# Patient Record
Sex: MALE | Race: BLACK OR AFRICAN AMERICAN | Employment: UNEMPLOYED | URBAN - METROPOLITAN AREA
[De-identification: names, ages, dates, MRNs, and addresses within clinical notes are randomized per-mention and may not be internally consistent; named-entity substitution may affect disease eponyms.]

---

## 2017-02-10 ENCOUNTER — OFFICE VISIT (OUTPATIENT)
Dept: INTERNAL MEDICINE CLINIC | Age: 52
End: 2017-02-10

## 2017-02-10 VITALS
BODY MASS INDEX: 32.32 KG/M2 | SYSTOLIC BLOOD PRESSURE: 119 MMHG | WEIGHT: 194 LBS | HEIGHT: 65 IN | DIASTOLIC BLOOD PRESSURE: 80 MMHG | HEART RATE: 67 BPM | RESPIRATION RATE: 14 BRPM

## 2017-02-10 DIAGNOSIS — E78.00 HYPERCHOLESTEREMIA: Primary | ICD-10-CM

## 2017-02-10 DIAGNOSIS — M62.838 TRAPEZIUS MUSCLE SPASM: ICD-10-CM

## 2017-02-10 RX ORDER — TADALAFIL 20 MG/1
20 TABLET ORAL AS NEEDED
Qty: 6 TAB | Refills: 0 | Status: SHIPPED | OUTPATIENT
Start: 2017-02-10 | End: 2017-07-13 | Stop reason: ALTCHOICE

## 2017-02-10 RX ORDER — ARIPIPRAZOLE 2 MG/1
5 TABLET ORAL 2 TIMES DAILY
COMMUNITY
End: 2017-10-12

## 2017-02-10 RX ORDER — LOVASTATIN 40 MG/1
40 TABLET ORAL
Qty: 30 TAB | Refills: 6 | Status: SHIPPED | OUTPATIENT
Start: 2017-02-10 | End: 2017-10-12

## 2017-02-10 NOTE — PROGRESS NOTES
Chief Complaint   Patient presents with    Cholesterol Problem     f/u     he is a 46y.o. year old male who presents for follow-up of   hyperlipidemia. Cardiovascular risk analysis - LDL goal is under 160  hypertension  hyperlipidemia. Compliance with treatment thus far has been good. New concerns: He states that he has tried to call to get a prescription for the new dose of cholesterol but hasn't been able to get through. He is still on 20 mg. Social History, Diet and Lifestyle: generally follows a low fat low cholesterol diet      Lab Results  Component Value Date/Time   Cholesterol, total 213 11/11/2016 12:06 PM   HDL Cholesterol 53 11/11/2016 12:06 PM   LDL, calculated 141 11/11/2016 12:06 PM   Triglyceride 97 11/11/2016 12:06 PM          ROS: taking medications as instructed, no medication side effects noted, no TIA's, no chest pain on exertion, no dyspnea on exertion, no swelling of ankles. Reviewed and agree with Nurse Note and duplicated in this note. Reviewed PmHx, RxHx, FmHx, SocHx, AllgHx and updated and dated in the chart.     Family History   Problem Relation Age of Onset    Heart Disease Mother     Stroke Mother        Past Medical History   Diagnosis Date    Anxiety disorder     Depression     Diarrhea     Falls     Headache     Joint pain     Joint pain     Memory disorder     Memory loss     Muscle pain     Muscle pain     Muscle weakness     Muscle weakness     Torn rotator cuff      right side    Vertigo     Visual disturbance     Visual disturbance       Social History     Social History    Marital status:      Spouse name: N/A    Number of children: N/A    Years of education: N/A     Social History Main Topics    Smoking status: Never Smoker    Smokeless tobacco: Never Used    Alcohol use No    Drug use: Not on file    Sexual activity: Not on file     Other Topics Concern    Not on file     Social History Narrative      Patient Active Problem List    Diagnosis Date Noted    Anxiety 12/13/2016    Intractable chronic post-traumatic headache 12/13/2016    ADD (attention deficit disorder) 12/13/2016    Hypovitaminosis D 03/07/2016    Hypercholesteremia 03/07/2016     Current Outpatient Prescriptions   Medication Sig Dispense Refill    ARIPiprazole (ABILIFY) 2 mg tablet Take 2 mg by mouth daily.  tadalafil (CIALIS) 20 mg tablet Take 1 Tab by mouth as needed. 6 Tab 0    lovastatin (MEVACOR) 40 mg tablet Take 1 Tab by mouth nightly. 30 Tab 6    atorvastatin (LIPITOR) 20 mg tablet Take  by mouth daily.  gabapentin (NEURONTIN) 300 mg capsule Take 2 Caps by mouth three (3) times daily. 180 Cap 3    prazosin (MINIPRESS) 2 mg capsule Take 5 mg by mouth nightly.  sertraline (ZOLOFT) 100 mg tablet Take 200 mg by mouth daily.  dextromethorphan-quiNIDine (NUEDEXTA) 20-10 mg per capsule Take 1 Cap by mouth two (2) times a day.  butalbital-acetaminophen-caffeine (FIORICET) -40 mg per tablet Take 1 Tab by mouth. No Known Allergies  Past Medical History   Diagnosis Date    Anxiety disorder     Depression     Diarrhea     Falls     Headache     Joint pain     Joint pain     Memory disorder     Memory loss     Muscle pain     Muscle pain     Muscle weakness     Muscle weakness     Torn rotator cuff      right side    Vertigo     Visual disturbance     Visual disturbance      No past surgical history on file.   Family History   Problem Relation Age of Onset    Heart Disease Mother     Stroke Mother            Objective:     Vitals:    02/10/17 0936   BP: 119/80   Pulse: 67   Resp: 14   Weight: 194 lb (88 kg)   Height: 5' 5\" (1.651 m)       Physical Examination: General appearance - alert, well appearing, and in no distress  Eyes - pupils equal and reactive, extraocular eye movements intact  Ears - bilateral TM's and external ear canals normal  Nose - normal and patent, no erythema, discharge or polyps  Mouth - mucous membranes moist, pharynx normal without lesions  Neck - supple, no significant adenopathy  Chest - clear to auscultation, no wheezes, rales or rhonchi, symmetric air entry  Heart - normal rate, regular rhythm, normal S1, S2, no murmurs, rubs, clicks or gallops  Abdomen - soft, nontender, nondistended, no masses or organomegaly  Musculoskeletal - The right shoulder is  normal to inspection. The patient has full range of motion  . The shoulderis tender to palpation upper trap. Bicep tendon: non-tender  The NEER test is negative. The Snyder test:is negative   The Cross over test:is  negative. The Empty Can test:is  negative. Stressed ext rotation is:  negative. Stressed int rotation: negative. The Apprehension Sign is negative. The Lift off test is:  negative   Examination reveals the Painful Arch:  negative. The Labral Test is:  negative. The Sulcus Sign is:  negative. Extremities - peripheral pulses normal, no pedal edema, no clubbing or cyanosis  Skin - normal coloration and turgor, no rashes, no suspicious skin lesions noted    Assessment/ Plan:   Lauren was seen today for cholesterol problem. Diagnoses and all orders for this visit:    Hypercholesteremia    Trapezius muscle spasm  -     REFERRAL TO PHYSICAL THERAPY    Other orders  -     tadalafil (CIALIS) 20 mg tablet; Take 1 Tab by mouth as needed. -     lovastatin (MEVACOR) 40 mg tablet; Take 1 Tab by mouth nightly. Follow-up Disposition:  Return in about 6 months (around 8/10/2017), or if symptoms worsen or fail to improve. I have discussed the diagnosis with the patient and the intended plan as seen in the above orders. The patient has received an after-visit summary and questions were answered concerning future plans.      Medication Side Effects and Warnings were discussed with patient: yes  Patient Labs were reviewed and or requested: yes  Patient Past Records were reviewed and or requested  yes  I have discussed the diagnosis with the patient and the intended plan as seen in the above orders. The patient has received an after-visit summary and questions were answered concerning future plans. Pt agrees to call or return to clinic and/or go to closest ER with any worsening of symptoms. This may include, but not limited to increased fever (>100.4) with NSAIDS or Tylenol, increased edema, confusion, rash, worsening of presenting symptoms. 1) Remember to stay active and/or exercise regularly (I suggest 30-45 minutes daily)   2) For reliable dietary information, go to www. EATRIGHT.org. You may wish to consider seeing the nutritionist at [facility] at #[phone] or [phone], also consider the [address]   3) I routinely suggest a complete physical exam once each year (your birth month)

## 2017-02-10 NOTE — MR AVS SNAPSHOT
Visit Information Date & Time Provider Department Dept. Phone Encounter #  
 2/10/2017  8:45 AM [address] [place] [address] MD [facility] [phone] [id number] Follow-up Instructions Return in about 6 months (around 8/10/2017), or if symptoms worsen or fail to improve. Follow-up and Disposition History Your Appointments 3/22/2017  9:40 AM  
Follow Up with MD [facility] at [address] [place] [address]) Appt Note: 3 month f/u headaches KU 12/13 [address] [place] [address] Drive [address] [facility] [address]  
[phone]  
  
   
 [address] [place] [id number]  
  
    
 8/4/2017  8:45 AM  
Any with [address] [place] [address] MD Siobhan  
[address] [place] [address] and Primary Care [address] [place] [address]) [address] [facility]  
  
   
 [address] [id number] Upcoming Health Maintenance Date Due FOBT Q 1 YEAR AGE 50-75 3/3/2017 DTaP/Tdap/Td series (2 - Td) 3/3/2026 Allergies as of 2/10/2017  Review Complete On: 2/10/2017 By: [address] [place] [address] MD Siobhan  
 No Known Allergies Current Immunizations  Reviewed on 3/3/2016 Name Date Tdap 3/3/2016 Not reviewed this visit You Were Diagnosed With   
  
 Codes Comments Hypercholesteremia    -  Primary ICD-10-CM: E78.00 ICD-9-CM: 272.0 Trapezius muscle spasm     ICD-10-CM: B22.575 ICD-9-CM: 728.85 Vitals BP Pulse Resp Height(growth percentile) Weight(growth percentile) BMI  
 119/80 67 14 5' 5\" (1.651 m) 194 lb (88 kg) 32.28 kg/m2 Smoking Status Never Smoker BMI and BSA Data Body Mass Index Body Surface Area  
 32.28 kg/m 2 2.01 m 2 Preferred Pharmacy Pharmacy Name Phone [id number] [place], [address] [address] [phone] Your Updated Medication List  
  
   
 This list is accurate as of: 2/10/17  9:58 AM.  Always use your most recent med list.  
  
  
  
  
 ABILIFY 2 mg tablet Generic drug:  ARIPiprazole Take 2 mg by mouth daily. atorvastatin 20 mg tablet Commonly known as:  LIPITOR Take  by mouth daily. FIORICET -40 mg per tablet Generic drug:  butalbital-acetaminophen-caffeine Take 1 Tab by mouth.  
  
 gabapentin 300 mg capsule Commonly known as:  NEURONTIN Take 2 Caps by mouth three (3) times daily. lovastatin 40 mg tablet Commonly known as:  MEVACOR Take 1 Tab by mouth nightly. NUEDEXTA 20-10 mg per capsule Generic drug:  dextromethorphan-quiNIDine Take 1 Cap by mouth two (2) times a day. prazosin 2 mg capsule Commonly known as:  MINIPRESS Take 5 mg by mouth nightly. tadalafil 20 mg tablet Commonly known as:  CIALIS Take 1 Tab by mouth as needed. ZOLOFT 100 mg tablet Generic drug:  sertraline Take 200 mg by mouth daily. Prescriptions Sent to Pharmacy Refills  
 tadalafil (CIALIS) 20 mg tablet 0 Sig: Take 1 Tab by mouth as needed. Class: Normal  
 Pharmacy: [address] [place] [address] Ph #: [phone] Route: Oral  
 lovastatin (MEVACOR) 40 mg tablet 6 Sig: Take 1 Tab by mouth nightly. Class: Normal  
 Pharmacy: [address] [place] [address] Ph #: [phone] Route: Oral  
  
We Performed the Following LIPID PANEL [14373 CPT(R)] METABOLIC PANEL, COMPREHENSIVE [62495 CPT(R)] REFERRAL TO PHYSICAL THERAPY [AGF95 Custom] Follow-up Instructions Return in about 6 months (around 8/10/2017), or if symptoms worsen or fail to improve. Referral Information Referral ID Referred By Referred To  
  
 [id number] Sharona Shay [facility] [place] MICHELLE PT PHILLIP   
   [address]   
   [place], [address] Phone: [phone] Fax: [phone] Visits Status Start Date End Date  
 21 New Request 2/10/17 2/10/18 If your referral has a status of pending review or denied, additional information will be sent to support the outcome of this decision. Introducing [facility] & HEALTH SERVICES! Dear Chandrika Perdue: 
Thank you for requesting a Salmon Social account. Our records indicate that you already have an active Salmon Social account. You can access your account anytime at [email or web address] Did you know that you can access your hospital and ER discharge instructions at any time in Salmon Social? You can also review all of your test results from your hospital stay or ER visit. Additional Information If you have questions, please visit the Frequently Asked Questions section of the Salmon Social website at [email or web address] Remember, Salmon Social is NOT to be used for urgent needs. For medical emergencies, dial 911. Now available from your iPhone and Android! Please provide this summary of care documentation to your next provider. Your primary care clinician is listed as [address] [place] [address] . If you have any questions after today's visit, please call [phone].

## 2017-02-27 ENCOUNTER — APPOINTMENT (OUTPATIENT)
Dept: PHYSICAL THERAPY | Age: 52
End: 2017-02-27

## 2017-03-22 ENCOUNTER — OFFICE VISIT (OUTPATIENT)
Dept: NEUROLOGY | Age: 52
End: 2017-03-22

## 2017-03-22 VITALS
DIASTOLIC BLOOD PRESSURE: 90 MMHG | OXYGEN SATURATION: 98 % | SYSTOLIC BLOOD PRESSURE: 130 MMHG | BODY MASS INDEX: 32.45 KG/M2 | WEIGHT: 195 LBS | RESPIRATION RATE: 18 BRPM | HEART RATE: 75 BPM

## 2017-03-22 DIAGNOSIS — G44.321 INTRACTABLE CHRONIC POST-TRAUMATIC HEADACHE: Primary | ICD-10-CM

## 2017-03-22 RX ORDER — GABAPENTIN 300 MG/1
600 CAPSULE ORAL 3 TIMES DAILY
Qty: 180 CAP | Refills: 3 | Status: SHIPPED | COMMUNITY
Start: 2017-03-22 | End: 2017-07-13 | Stop reason: DRUGHIGH

## 2017-03-22 RX ORDER — CYCLOBENZAPRINE HCL 10 MG
TABLET ORAL
COMMUNITY
End: 2017-10-12

## 2017-03-22 NOTE — MR AVS SNAPSHOT
Visit Information Date & Time Provider Department Dept. Phone Encounter #  
 3/22/2017  9:40 AM Lorin Castellanos MD [facility] at [address] [id number] Follow-up Instructions Return in about 3 months (around 6/22/2017). Your Appointments 8/4/2017  8:45 AM  
Any with Edwardo Huitron MD  
[address] [place] [address] and Primary Care [facility]) [address] [facility]  
  
   
 [address] [id number] Upcoming Health Maintenance Date Due FOBT Q 1 YEAR AGE 50-75 3/3/2017 DTaP/Tdap/Td series (2 - Td) 3/3/2026 Allergies as of 3/22/2017  Review Complete On: 3/22/2017 By: Lorin Castellanos MD  
 No Known Allergies Current Immunizations  Reviewed on 3/3/2016 Name Date Tdap 3/3/2016 Not reviewed this visit You Were Diagnosed With   
  
 Codes Comments Intractable chronic post-traumatic headache    -  Primary ICD-10-CM: U38.463 ICD-9-CM: 339.22 Vitals BP Pulse Resp Weight(growth percentile) SpO2 BMI  
 130/90 75 18 195 lb (88.5 kg) 98% 32.45 kg/m2 Smoking Status Never Smoker BMI and BSA Data Body Mass Index Body Surface Area  
 32.45 kg/m 2 2.01 m 2 Preferred Pharmacy Pharmacy Name Phone [facility], [address] RUDD Your Updated Medication List  
  
   
This list is accurate as of: 3/22/17 10:02 AM.  Always use your most recent med list.  
  
  
  
  
 ABILIFY 2 mg tablet Generic drug:  ARIPiprazole Take 2 mg by mouth daily. atorvastatin 20 mg tablet Commonly known as:  LIPITOR Take  by mouth daily. cyclobenzaprine 10 mg tablet Commonly known as:  FLEXERIL Take  by mouth three (3) times daily as needed for Muscle Spasm(s). FIORICET -40 mg per tablet Generic drug:  butalbital-acetaminophen-caffeine Take 1 Tab by mouth.  
  
 gabapentin 300 mg capsule Commonly known as:  NEURONTIN Take 2 Caps by mouth three (3) times daily. lovastatin 40 mg tablet Commonly known as:  MEVACOR Take 1 Tab by mouth nightly. NUEDEXTA 20-10 mg per capsule Generic drug:  dextromethorphan-quiNIDine Take 1 Cap by mouth two (2) times a day. prazosin 2 mg capsule Commonly known as:  MINIPRESS Take 5 mg by mouth nightly. tadalafil 20 mg tablet Commonly known as:  CIALIS Take 1 Tab by mouth as needed. ZOLOFT 100 mg tablet Generic drug:  sertraline Take 200 mg by mouth daily. Prescriptions Sent to Mail Order Refills  
 gabapentin (NEURONTIN) 300 mg capsule 3 Sig: Take 2 Caps by mouth three (3) times daily. Class: Mail Order Pharmacy: [facility], [address] [address]  #: [phone] Route: Oral  
  
Follow-up Instructions Return in about 3 months (around 6/22/2017). Patient Instructions A Healthy Lifestyle: Care Instructions Your Care Instructions A healthy lifestyle can help you feel good, stay at a healthy weight, and have plenty of energy for both work and play. A healthy lifestyle is something you can share with your whole family. A healthy lifestyle also can lower your risk for serious health problems, such as high blood pressure, heart disease, and diabetes. You can follow a few steps listed below to improve your health and the health of your family. Follow-up care is a key part of your treatment and safety. Be sure to make and go to all appointments, and call your doctor if you are having problems. Its also a good idea to know your test results and keep a list of the medicines you take. How can you care for yourself at home? · Do not eat too much sugar, fat, or fast foods. You can still have dessert and treats now and then. The goal is moderation. · Start small to improve your eating habits. Pay attention to portion sizes, drink less juice and soda pop, and eat more fruits and vegetables. ¨ Eat a healthy amount of food. A 3-ounce serving of meat, for example, is about the size of a deck of cards. Fill the rest of your plate with vegetables and whole grains. ¨ Limit the amount of soda and sports drinks you have every day. Drink more water when you are thirsty. ¨ Eat at least 5 servings of fruits and vegetables every day. It may seem like a lot, but it is not hard to reach this goal. A serving or helping is 1 piece of fruit, 1 cup of vegetables, or 2 cups of leafy, raw vegetables. Have an apple or some carrot sticks as an afternoon snack instead of a candy bar. Try to have fruits and/or vegetables at every meal. 
· Make exercise part of your daily routine. You may want to start with simple activities, such as walking, bicycling, or slow swimming. Try to be active 30 to 60 minutes every day. You do not need to do all 30 to 60 minutes all at once. For example, you can exercise 3 times a day for 10 or 20 minutes. Moderate exercise is safe for most people, but it is always a good idea to talk to your doctor before starting an exercise program. 
· Keep moving. Michelle Hesteris the lawn, work in the garden, or [place]. Take the stairs instead of the elevator at work. · If you smoke, quit. People who smoke have an increased risk for heart attack, stroke, cancer, and other lung illnesses. Quitting is hard, but there are ways to boost your chance of quitting tobacco for good. ¨ Use nicotine gum, patches, or lozenges. ¨ Ask your doctor about stop-smoking programs and medicines. ¨ Keep trying. In addition to reducing your risk of diseases in the future, you will notice some benefits soon after you stop using tobacco. If you have shortness of breath or asthma symptoms, they will likely get better within a few weeks after you quit. · Limit how much alcohol you drink. Moderate amounts of alcohol (up to 2 drinks a day for men, 1 drink a day for women) are okay. But drinking too much can lead to liver problems, high blood pressure, and other health problems. Family health If you have a family, there are many things you can do together to improve your health. · Eat meals together as a family as often as possible. · Eat healthy foods. This includes fruits, vegetables, lean meats and dairy, and whole grains. · Include your family in your fitness plan. Most people think of activities such as jogging or tennis as the way to fitness, but there are many ways you and your family can be more active. Anything that makes you breathe hard and gets your heart pumping is exercise. Here are some tips: 
¨ Walk to do errands or to take your child to school or the bus. ¨ Go for a family bike ride after dinner instead of watching TV. Where can you learn more? Go to http://paulie-nestor.info/. Enter P902 in the search box to learn more about \"A Healthy Lifestyle: Care Instructions. \" Current as of: July 26, 2016 Content Version: 11.1 © 4604-9958 [employer], [employer]. Care instructions adapted under license by Open Me (which disclaims liability or warranty for this information). If you have questions about a medical condition or this instruction, always ask your healthcare professional. [address] any warranty or liability for your use of this information. Introducing [facility] & HEALTH SERVICES! Dear [facility]: 
Thank you for requesting a Restalo account. Our records indicate that you already have an active Restalo account. You can access your account anytime at [email or web address] Did you know that you can access your hospital and ER discharge instructions at any time in Restalo? You can also review all of your test results from your hospital stay or ER visit. Additional Information If you have questions, please visit the Frequently Asked Questions section of the Linkagoalt website at [email or web address] Remember, [employer] is NOT to be used for urgent needs. For medical emergencies, dial 911. Now available from your iPhone and Android! Please provide this summary of care documentation to your next provider. Your primary care clinician is listed as Alyson Edwards. If you have any questions after today's visit, please call [phone].
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
## 2017-03-22 NOTE — PATIENT INSTRUCTIONS

## 2017-07-13 ENCOUNTER — OFFICE VISIT (OUTPATIENT)
Dept: NEUROLOGY | Age: 52
End: 2017-07-13

## 2017-07-13 VITALS
RESPIRATION RATE: 14 BRPM | BODY MASS INDEX: 30.99 KG/M2 | HEART RATE: 74 BPM | WEIGHT: 186 LBS | DIASTOLIC BLOOD PRESSURE: 96 MMHG | SYSTOLIC BLOOD PRESSURE: 148 MMHG | OXYGEN SATURATION: 96 % | HEIGHT: 65 IN

## 2017-07-13 DIAGNOSIS — G43.709 CHRONIC MIGRAINE WITHOUT AURA WITHOUT STATUS MIGRAINOSUS, NOT INTRACTABLE: ICD-10-CM

## 2017-07-13 DIAGNOSIS — R90.89 ABNORMAL FINDING ON MRI OF BRAIN: ICD-10-CM

## 2017-07-13 DIAGNOSIS — G44.329 CHRONIC POST-TRAUMATIC HEADACHE, NOT INTRACTABLE: Primary | ICD-10-CM

## 2017-07-13 RX ORDER — GABAPENTIN 600 MG/1
600 TABLET ORAL 3 TIMES DAILY
Qty: 90 TAB | Refills: 5 | Status: SHIPPED | OUTPATIENT
Start: 2017-07-13 | End: 2017-10-12

## 2017-07-13 RX ORDER — TRAZODONE HYDROCHLORIDE 50 MG/1
50 TABLET ORAL
COMMUNITY

## 2017-07-13 NOTE — MR AVS SNAPSHOT
Visit Information Date & Time Provider Department Dept. Phone Encounter #  
 7/13/2017  8:40 AM MD [facility] at [address] [id number] Follow-up Instructions Return in about 4 months (around 11/13/2017). Your Appointments 8/4/2017  8:45 AM  
Any with Rose Montenegro MD  
[address] [place] [address] and Primary Care [facility] UlDavid Pose[address] [facility]  
  
   
 Kathy [address]ona [phone] Upcoming Health Maintenance Date Due FOBT Q 1 YEAR AGE 50-75 3/3/2017 INFLUENZA AGE 9 TO ADULT 8/1/2017 DTaP/Tdap/Td series (2 - Td) 3/3/2026 Allergies as of 7/13/2017  Review Complete On: 7/13/2017 By: Christiana Pineda MD  
 No Known Allergies Current Immunizations  Reviewed on 3/3/2016 Name Date Tdap 3/3/2016 Not reviewed this visit You Were Diagnosed With   
  
 Codes Comments Chronic post-traumatic headache, not intractable    -  Primary ICD-10-CM: S88.368 ICD-9-CM: 339.22 Chronic migraine without aura without status migrainosus, not intractable     ICD-10-CM: U21.292 ICD-9-CM: 346.70 Abnormal finding on MRI of brain     ICD-10-CM: R90.89 ICD-9-CM: 793.0 Vitals BP Pulse Resp Height(growth percentile) Weight(growth percentile) SpO2  
 (!) 148/96 74 14 5' 5\" (1.651 m) 186 lb (84.4 kg) 96% BMI Smoking Status 30.95 kg/m2 Never Smoker Vitals History BMI and BSA Data Body Mass Index Body Surface Area 30.95 kg/m 2 1.97 m 2 Preferred Pharmacy Pharmacy Name Phone [facility] [place], [address] RUDD Your Updated Medication List  
  
   
This list is accurate as of: 7/13/17  9:12 AM.  Always use your most recent med list.  
  
  
  
  
 ABILIFY 2 mg tablet Generic drug:  ARIPiprazole Take 5 mg by mouth two (2) times a day. cyclobenzaprine 10 mg tablet Commonly known as:  FLEXERIL Take  by mouth three (3) times daily as needed for Muscle Spasm(s). FIORICET -40 mg per tablet Generic drug:  butalbital-acetaminophen-caffeine Take 1 Tab by mouth.  
  
 gabapentin 600 mg tablet Commonly known as:  NEURONTIN Take 1 Tab by mouth three (3) times daily. lovastatin 40 mg tablet Commonly known as:  MEVACOR Take 1 Tab by mouth nightly. NUEDEXTA 20-10 mg per capsule Generic drug:  dextromethorphan-quiNIDine Take 1 Cap by mouth two (2) times a day. prazosin 2 mg capsule Commonly known as:  MINIPRESS Take 5 mg by mouth nightly. traZODone 50 mg tablet Commonly known as:  Navid Barge Take 25 mg by mouth nightly. ZOLOFT 100 mg tablet Generic drug:  sertraline Take 200 mg by mouth daily. Prescriptions Sent to Pharmacy Refills  
 gabapentin (NEURONTIN) 600 mg tablet 5 Sig: Take 1 Tab by mouth three (3) times daily. Class: Normal  
 Pharmacy: [address] [place] [address], [address] [place] [address] #: [phone] Route: Oral  
  
Follow-up Instructions Return in about 4 months (around 11/13/2017). To-Do List   
 07/17/2017 Imaging:  MRI BRAIN W WO CONT Patient Instructions A Healthy Lifestyle: Care Instructions Your Care Instructions A healthy lifestyle can help you feel good, stay at a healthy weight, and have plenty of energy for both work and play. A healthy lifestyle is something you can share with your whole family. A healthy lifestyle also can lower your risk for serious health problems, such as high blood pressure, heart disease, and diabetes. You can follow a few steps listed below to improve your health and the health of your family. Follow-up care is a key part of your treatment and safety.  Be sure to make and go to all appointments, and call your doctor if you are having problems. Its also a good idea to know your test results and keep a list of the medicines you take. How can you care for yourself at home? · Do not eat too much sugar, fat, or fast foods. You can still have dessert and treats now and then. The goal is moderation. · Start small to improve your eating habits. Pay attention to portion sizes, drink less juice and soda pop, and eat more fruits and vegetables. ¨ Eat a healthy amount of food. A 3-ounce serving of meat, for example, is about the size of a deck of cards. Fill the rest of your plate with vegetables and whole grains. ¨ Limit the amount of soda and sports drinks you have every day. Drink more water when you are thirsty. ¨ Eat at least 5 servings of fruits and vegetables every day. It may seem like a lot, but it is not hard to reach this goal. A serving or helping is 1 piece of fruit, 1 cup of vegetables, or 2 cups of leafy, raw vegetables. Have an apple or some carrot sticks as an afternoon snack instead of a candy bar. Try to have fruits and/or vegetables at every meal. 
· Make exercise part of your daily routine. You may want to start with simple activities, such as walking, bicycling, or slow swimming. Try to be active 30 to 60 minutes every day. You do not need to do all 30 to 60 minutes all at once. For example, you can exercise 3 times a day for 10 or 20 minutes. Moderate exercise is safe for most people, but it is always a good idea to talk to your doctor before starting an exercise program. 
· Keep moving. Dustin Em the lawn, work in the garden, or [employer]. Take the stairs instead of the elevator at work. · If you smoke, quit. People who smoke have an increased risk for heart attack, stroke, cancer, and other lung illnesses. Quitting is hard, but there are ways to boost your chance of quitting tobacco for good. ¨ Use nicotine gum, patches, or lozenges. ¨ Ask your doctor about stop-smoking programs and medicines. ¨ Keep trying. In addition to reducing your risk of diseases in the future, you will notice some benefits soon after you stop using tobacco. If you have shortness of breath or asthma symptoms, they will likely get better within a few weeks after you quit. · Limit how much alcohol you drink. Moderate amounts of alcohol (up to 2 drinks a day for men, 1 drink a day for women) are okay. But drinking too much can lead to liver problems, high blood pressure, and other health problems. Family health If you have a family, there are many things you can do together to improve your health. · Eat meals together as a family as often as possible. · Eat healthy foods. This includes fruits, vegetables, lean meats and dairy, and whole grains. · Include your family in your fitness plan. Most people think of activities such as jogging or tennis as the way to fitness, but there are many ways you and your family can be more active. Anything that makes you breathe hard and gets your heart pumping is exercise. Here are some tips: 
¨ Walk to do errands or to take your child to school or the bus. ¨ Go for a family bike ride after dinner instead of watching TV. Where can you learn more? Go to http://paulie-nestor.info/. Enter R558 in the search box to learn more about \"A Healthy Lifestyle: Care Instructions. \" Current as of: July 26, 2016 Content Version: 11.3 © 1911-2098 Healthwise, Incorporated. Care instructions adapted under license by "[employer] (which disclaims liability or warranty for this information). If you have questions about a medical condition or this instruction, always ask your healthcare professional. [place] [address] any warranty or liability for your use of this information. Introducing [facility] & HEALTH SERVICES! Dear Twila Prieto: 
Thank you for requesting a [employer] account. Our records indicate that you already have an active [employer] account.   You can access your account anytime at [email or web address] Did you know that you can access your hospital and ER discharge instructions at any time in WayConnected? You can also review all of your test results from your hospital stay or ER visit. Additional Information If you have questions, please visit the Frequently Asked Questions section of the Way[employer] website at [email or web address] Remember, Way[employer]cted is NOT to be used for urgent needs. For medical emergencies, dial 911. Now available from your iPhone and Android! Please provide this summary of care documentation to your next provider. Your primary care clinician is listed as Octavia Rinaldi. If you have any questions after today's visit, please call [phone].

## 2017-07-13 NOTE — PROGRESS NOTES
Patient is here for headaches/follow up  Patient is still having headaches 2x weekly  States that they are not as intense

## 2017-07-13 NOTE — PATIENT INSTRUCTIONS
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
## 2017-07-14 ENCOUNTER — TELEPHONE (OUTPATIENT)
Dept: NEUROLOGY | Age: 52
End: 2017-07-14

## 2017-08-03 ENCOUNTER — TELEPHONE (OUTPATIENT)
Dept: NEUROLOGY | Age: 52
End: 2017-08-03

## 2017-08-03 NOTE — TELEPHONE ENCOUNTER
----- Message from Dustin Marrero sent at 8/3/2017 10:39 AM EDT -----  Regarding: Dr. Tamara Rueda of [facility]) would like a call from \"Rebeca\" regarding previous conversation concerning status of pt's bills. Best contact number [phone].

## 2017-08-08 NOTE — TELEPHONE ENCOUNTER
Called the [email or web address] office of [facility] and spoke with Russ. I let her know that we do not fill out the form she was requesting for the bills. I told her that we can send the bill to them because we do not do 3rd party billing.

## 2017-08-10 ENCOUNTER — OFFICE VISIT (OUTPATIENT)
Dept: INTERNAL MEDICINE CLINIC | Age: 52
End: 2017-08-10

## 2017-08-10 VITALS
SYSTOLIC BLOOD PRESSURE: 129 MMHG | BODY MASS INDEX: 31.32 KG/M2 | RESPIRATION RATE: 14 BRPM | TEMPERATURE: 98.1 F | HEART RATE: 68 BPM | WEIGHT: 188 LBS | DIASTOLIC BLOOD PRESSURE: 82 MMHG | OXYGEN SATURATION: 98 % | HEIGHT: 65 IN

## 2017-08-10 DIAGNOSIS — E78.00 HYPERCHOLESTEREMIA: Primary | ICD-10-CM

## 2017-08-10 NOTE — MR AVS SNAPSHOT
Visit Information Date & Time Provider Department Dept. Phone Encounter #  
 8/10/2017  1:15 PM [facility] MD Siobhan [facility] [phone] [id number] Follow-up Instructions Return in about 6 months (around 2/10/2018) for cholesterol. Follow-up and Disposition History Upcoming Health Maintenance Date Due FOBT Q 1 YEAR AGE 50-75 3/3/2017 INFLUENZA AGE 9 TO ADULT 8/1/2017 DTaP/Tdap/Td series (2 - Td) 3/3/2026 Allergies as of 8/10/2017  Review Complete On: 8/10/2017 By: [facility] MD Siobhan  
 No Known Allergies Current Immunizations  Reviewed on 3/3/2016 Name Date Tdap 3/3/2016 Not reviewed this visit You Were Diagnosed With   
  
 Codes Comments Hypercholesteremia    -  Primary ICD-10-CM: E78.00 ICD-9-CM: 272.0 Vitals BP Pulse Temp Resp Height(growth percentile) Weight(growth percentile) 129/82 68 98.1 °F (36.7 °C) (Oral) 14 5' 5\" (1.651 m) 188 lb (85.3 kg) SpO2 BMI Smoking Status 98% 31.28 kg/m2 Never Smoker Vitals History BMI and BSA Data Body Mass Index Body Surface Area  
 31.28 kg/m 2 1.98 m 2 Preferred Pharmacy Pharmacy Name Phone [facility] [place], [address] RUDD Your Updated Medication List  
  
   
This list is accurate as of: 8/10/17  2:16 PM.  Always use your most recent med list.  
  
  
  
  
 ABILIFY 2 mg tablet Generic drug:  ARIPiprazole Take 5 mg by mouth two (2) times a day. cyclobenzaprine 10 mg tablet Commonly known as:  FLEXERIL Take  by mouth three (3) times daily as needed for Muscle Spasm(s). FIORICET -40 mg per tablet Generic drug:  butalbital-acetaminophen-caffeine Take 1 Tab by mouth.  
  
 gabapentin 600 mg tablet Commonly known as:  NEURONTIN Take 1 Tab by mouth three (3) times daily. lovastatin 40 mg tablet Commonly known as:  MEVACOR  
 Take 1 Tab by mouth nightly. NUEDEXTA 20-10 mg per capsule Generic drug:  dextromethorphan-quiNIDine Take 1 Cap by mouth two (2) times a day. prazosin 2 mg capsule Commonly known as:  MINIPRESS Take 5 mg by mouth nightly. traZODone 50 mg tablet Commonly known as:  Rico Harrier Take 25 mg by mouth nightly. ZOLOFT 100 mg tablet Generic drug:  sertraline Take 200 mg by mouth daily. We Performed the Following LIPID PANEL [19253 CPT(R)] METABOLIC PANEL, COMPREHENSIVE [96786 CPT(R)] OCCULT BLOOD, IMMUNOASSAY (FIT) [id number] CPT(R)] Follow-up Instructions Return in about 6 months (around 2/10/2018) for cholesterol. To-Do List   
 08/18/2017 10:15 AM  
  Appointment with [facility] MRI 1 at [facility] RAD [facility] MRI ([phone]) 1. Please bring a list or a bag of your current medications to your appointment 2. Please be sure to remove ALL hair clips, pins, extensions, etc., prior to arriving for your MRI procedure. 3. If you have any medical implants or devices, please bring associated medical card with you. 4. Bring any non [facility] films or CDs pertaining to the area being imaged with you on the day of appointment. 5. A written order with a valid diagnosis and Physicians  signature is required for all scheduled tests. 6. Check in at registration 30min before your appointment time unless you were instructed to do otherwise. Introducing \[facility]\"" & HEALTH SERVICES! Dear Lalitha Shine: 
Thank you for requesting a [employer] account. Our records indicate that you already have an active [employer] account. You can access your account anytime at [email or web address] Did you know that you can access your hospital and ER discharge instructions at any time in [employer]? You can also review all of your test results from your hospital stay or ER visit. Additional Information If you have questions, please visit the Frequently Asked Questions section of the [employer] website at [email or web address] Remember, [employer] is NOT to be used for urgent needs. For medical emergencies, dial 911. Now available from your iPhone and Android! Please provide this summary of care documentation to your next provider. Your primary care clinician is listed as Roula Cristobalr. If you have any questions after today's visit, please call [phone].

## 2017-08-10 NOTE — PROGRESS NOTES
Chief Complaint   Patient presents with    Follow-up     he is a 46y.o. year old male who presents for evaluation of   hyperlipidemia. Cardiovascular risk analysis - hyperlipidemia. Compliance with treatment thus far has been good. New concerns: No new concerns . Social History, Diet and Lifestyle: generally follows a low fat low cholesterol diet      Lab Results  Component Value Date/Time   Cholesterol, total 213 11/11/2016 12:06 PM   HDL Cholesterol 53 11/11/2016 12:06 PM   LDL, calculated 141 11/11/2016 12:06 PM   Triglyceride 97 11/11/2016 12:06 PM          ROS: taking medications as instructed, no medication side effects noted, no TIA's, no chest pain on exertion, no dyspnea on exertion, no swelling of ankles. Reviewed and agree with Nurse Note and duplicated in this note. Reviewed PmHx, RxHx, FmHx, SocHx, AllgHx and updated and dated in the chart. Family History   Problem Relation Age of Onset    Heart Disease Mother     Stroke Mother        Past Medical History:   Diagnosis Date    Anxiety disorder     Depression     Diarrhea     Falls     Headache     Joint pain     Joint pain     Memory disorder     Memory loss     Muscle pain     Muscle pain     Muscle weakness     Muscle weakness     Torn rotator cuff     right side    Vertigo     Visual disturbance     Visual disturbance       Social History     Social History    Marital status:      Spouse name: N/A    Number of children: N/A    Years of education: N/A     Social History Main Topics    Smoking status: Never Smoker    Smokeless tobacco: Never Used    Alcohol use No    Drug use: None    Sexual activity: Not Asked     Other Topics Concern    None     Social History Narrative      Current Outpatient Prescriptions   Medication Sig Dispense Refill    traZODone (DESYREL) 50 mg tablet Take 25 mg by mouth nightly.  gabapentin (NEURONTIN) 600 mg tablet Take 1 Tab by mouth three (3) times daily.  90 Tab 5  cyclobenzaprine (FLEXERIL) 10 mg tablet Take  by mouth three (3) times daily as needed for Muscle Spasm(s).  ARIPiprazole (ABILIFY) 2 mg tablet Take 5 mg by mouth two (2) times a day.  lovastatin (MEVACOR) 40 mg tablet Take 1 Tab by mouth nightly. 30 Tab 6    prazosin (MINIPRESS) 2 mg capsule Take 5 mg by mouth nightly.  sertraline (ZOLOFT) 100 mg tablet Take 200 mg by mouth daily.  dextromethorphan-quiNIDine (NUEDEXTA) 20-10 mg per capsule Take 1 Cap by mouth two (2) times a day.  butalbital-acetaminophen-caffeine (FIORICET) -40 mg per tablet Take 1 Tab by mouth. No Known Allergies  Past Medical History:   Diagnosis Date    Anxiety disorder     Depression     Diarrhea     Falls     Headache     Joint pain     Joint pain     Memory disorder     Memory loss     Muscle pain     Muscle pain     Muscle weakness     Muscle weakness     Torn rotator cuff     right side    Vertigo     Visual disturbance     Visual disturbance      History reviewed. No pertinent surgical history.   Family History   Problem Relation Age of Onset    Heart Disease Mother     Stroke Mother      Social History   Substance Use Topics    Smoking status: Never Smoker    Smokeless tobacco: Never Used    Alcohol use No           Objective:     Vitals:    08/10/17 1336   Weight: 188 lb (85.3 kg)   Height: 5' 5\" (1.651 m)       Physical Examination: General appearance - alert, well appearing, and in no distress  Eyes - pupils equal and reactive, extraocular eye movements intact  Ears - bilateral TM's and external ear canals normal  Nose - normal and patent, no erythema, discharge or polyps  Mouth - mucous membranes moist, pharynx normal without lesions  Neck - supple, no significant adenopathy  Chest - clear to auscultation, no wheezes, rales or rhonchi, symmetric air entry  Heart - normal rate, regular rhythm, normal S1, S2, no murmurs, rubs, clicks or gallops  Abdomen - soft, nontender, nondistended, no masses or organomegaly  Musculoskeletal - no joint tenderness, deformity or swelling  Extremities - peripheral pulses normal, no pedal edema, no clubbing or cyanosis  Skin - normal coloration and turgor, no rashes, no suspicious skin lesions noted    Assessment/ Plan:   Diagnoses and all orders for this visit:    1. Hypercholesteremia  -     METABOLIC PANEL, COMPREHENSIVE  -     LIPID PANEL  -     OCCULT BLOOD, IMMUNOASSAY (FIT)       Follow-up Disposition:  Return in about 6 months (around 2/10/2018) for cholesterol. I have discussed the diagnosis with the patient and the intended plan as seen in the above orders. The patient has received an after-visit summary and questions were answered concerning future plans. Medication Side Effects and Warnings were discussed with patient: yes  Patient Labs were reviewed and or requested: yes  Patient Past Records were reviewed and or requested  yes  I have discussed the diagnosis with the patient and the intended plan as seen in the above orders. The patient has received an after-visit summary and questions were answered concerning future plans. Pt agrees to call or return to clinic and/or go to closest ER with any worsening of symptoms. This may include, but not limited to increased fever (>100.4) with NSAIDS or Tylenol, increased edema, confusion, rash, worsening of presenting symptoms. 1) Remember to stay active and/or exercise regularly (I suggest 30-45 minutes daily)   2) For reliable dietary information, go to www. EATRIGHT.org. You may wish to consider seeing the nutritionist at [facility] at #[phone] or [phone], also consider the [address]   3) I routinely suggest a complete physical exam once each year (your birth month)

## 2017-08-11 LAB
ALBUMIN SERPL-MCNC: 4.1 G/DL (ref 3.5–5.5)
ALBUMIN/GLOB SERPL: 1.6 {RATIO} (ref 1.2–2.2)
ALP SERPL-CCNC: 69 IU/L (ref 39–117)
ALT SERPL-CCNC: 28 IU/L (ref 0–44)
AST SERPL-CCNC: 26 IU/L (ref 0–40)
BILIRUB SERPL-MCNC: 0.7 MG/DL (ref 0–1.2)
BUN SERPL-MCNC: 11 MG/DL (ref 6–24)
BUN/CREAT SERPL: 10 (ref 9–20)
CALCIUM SERPL-MCNC: 9.4 MG/DL (ref 8.7–10.2)
CHLORIDE SERPL-SCNC: 102 MMOL/L (ref 96–106)
CHOLEST SERPL-MCNC: 242 MG/DL (ref 100–199)
CO2 SERPL-SCNC: 25 MMOL/L (ref 18–29)
CREAT SERPL-MCNC: 1.07 MG/DL (ref 0.76–1.27)
GLOBULIN SER CALC-MCNC: 2.5 G/DL (ref 1.5–4.5)
GLUCOSE SERPL-MCNC: 111 MG/DL (ref 65–99)
HDLC SERPL-MCNC: 56 MG/DL
LDLC SERPL CALC-MCNC: 160 MG/DL (ref 0–99)
POTASSIUM SERPL-SCNC: 3.8 MMOL/L (ref 3.5–5.2)
PROT SERPL-MCNC: 6.6 G/DL (ref 6–8.5)
SODIUM SERPL-SCNC: 144 MMOL/L (ref 134–144)
TRIGL SERPL-MCNC: 131 MG/DL (ref 0–149)
VLDLC SERPL CALC-MCNC: 26 MG/DL (ref 5–40)

## 2017-08-11 RX ORDER — ROSUVASTATIN CALCIUM 10 MG/1
10 TABLET, COATED ORAL
Qty: 30 TAB | Refills: 3 | Status: SHIPPED | OUTPATIENT
Start: 2017-08-11 | End: 2017-11-02 | Stop reason: SDUPTHER

## 2017-08-15 NOTE — TELEPHONE ENCOUNTER
MRI that was ordered, workman's comp is denying the procedure due to the diagnosis code. Quin Medina is requesting a returned call to try and get a better understanding of why.

## 2017-09-27 ENCOUNTER — TELEPHONE (OUTPATIENT)
Dept: NEUROLOGY | Age: 52
End: 2017-09-27

## 2017-09-27 NOTE — TELEPHONE ENCOUNTER
I called Ruth Ann and had to leave a message. I let her know that I will have to send over the fee sheet and that payment is due at the time of service.

## 2017-09-27 NOTE — TELEPHONE ENCOUNTER
----- Message from 5655 Estefany Hernandez sent at 9/27/2017 10:09 AM EDT -----  Regarding: Dr. Jess Proctor: [phone]  Ruth Ann with [facility] is requesting to set up a call between the  and the physician.

## 2017-10-04 ENCOUNTER — TELEPHONE (OUTPATIENT)
Dept: NEUROLOGY | Age: 52
End: 2017-10-04

## 2017-10-04 NOTE — TELEPHONE ENCOUNTER
----- Message from Dario Hernandez sent at 10/4/2017 11:42 AM EDT -----  Regarding: Dr. Tess Messina / Telephone  Pt's (Bailee Blakely) is requesting faxed copy of the pt's last office notes. [phone].

## 2017-10-10 ENCOUNTER — APPOINTMENT (OUTPATIENT)
Dept: GENERAL RADIOLOGY | Age: 52
DRG: 174 | End: 2017-10-10
Attending: INTERNAL MEDICINE
Payer: MEDICAID

## 2017-10-10 ENCOUNTER — HOSPITAL ENCOUNTER (INPATIENT)
Age: 52
LOS: 2 days | Discharge: HOME | DRG: 174 | End: 2017-10-12
Attending: STUDENT IN AN ORGANIZED HEALTH CARE EDUCATION/TRAINING PROGRAM | Admitting: INTERNAL MEDICINE
Payer: MEDICAID

## 2017-10-10 DIAGNOSIS — I21.09 ST ELEVATION MYOCARDIAL INFARCTION (STEMI) OF ANTERIOR WALL (HCC): Primary | ICD-10-CM

## 2017-10-10 PROBLEM — I21.11 ST ELEVATION (STEMI) MYOCARDIAL INFARCTION INVOLVING RIGHT CORONARY ARTERY (HCC): Status: ACTIVE | Noted: 2017-10-10

## 2017-10-10 PROBLEM — R07.9 CHEST PAIN: Status: ACTIVE | Noted: 2017-10-10

## 2017-10-10 LAB
ALBUMIN SERPL-MCNC: 3.7 G/DL (ref 3.5–5)
ALBUMIN/GLOB SERPL: 1 {RATIO} (ref 1.1–2.2)
ALP SERPL-CCNC: 80 U/L (ref 45–117)
ALT SERPL-CCNC: 33 U/L (ref 12–78)
ANION GAP SERPL CALC-SCNC: 9 MMOL/L (ref 5–15)
AST SERPL-CCNC: 26 U/L (ref 15–37)
BASOPHILS # BLD: 0 K/UL (ref 0–0.1)
BASOPHILS NFR BLD: 1 % (ref 0–1)
BILIRUB SERPL-MCNC: 1 MG/DL (ref 0.2–1)
BUN SERPL-MCNC: 14 MG/DL (ref 6–20)
BUN/CREAT SERPL: 12 (ref 12–20)
CALCIUM SERPL-MCNC: 8.6 MG/DL (ref 8.5–10.1)
CHLORIDE SERPL-SCNC: 105 MMOL/L (ref 97–108)
CK MB CFR SERPL CALC: 0.5 % (ref 0–2.5)
CK MB SERPL-MCNC: 2 NG/ML (ref 5–25)
CK SERPL-CCNC: 426 U/L (ref 39–308)
CO2 SERPL-SCNC: 27 MMOL/L (ref 21–32)
CREAT SERPL-MCNC: 1.15 MG/DL (ref 0.7–1.3)
EOSINOPHIL # BLD: 0.2 K/UL (ref 0–0.4)
EOSINOPHIL NFR BLD: 3 % (ref 0–7)
ERYTHROCYTE [DISTWIDTH] IN BLOOD BY AUTOMATED COUNT: 13.6 % (ref 11.5–14.5)
GLOBULIN SER CALC-MCNC: 3.7 G/DL (ref 2–4)
GLUCOSE SERPL-MCNC: 128 MG/DL (ref 65–100)
HCT VFR BLD AUTO: 41.1 % (ref 36.6–50.3)
HGB BLD-MCNC: 13.6 G/DL (ref 12.1–17)
LYMPHOCYTES # BLD: 1.4 K/UL (ref 0.8–3.5)
LYMPHOCYTES NFR BLD: 25 % (ref 12–49)
MCH RBC QN AUTO: 28.8 PG (ref 26–34)
MCHC RBC AUTO-ENTMCNC: 33.1 G/DL (ref 30–36.5)
MCV RBC AUTO: 86.9 FL (ref 80–99)
MONOCYTES # BLD: 0.3 K/UL (ref 0–1)
MONOCYTES NFR BLD: 6 % (ref 5–13)
NEUTS SEG # BLD: 3.6 K/UL (ref 1.8–8)
NEUTS SEG NFR BLD: 65 % (ref 32–75)
PLATELET # BLD AUTO: 205 K/UL (ref 150–400)
POTASSIUM SERPL-SCNC: 3.9 MMOL/L (ref 3.5–5.1)
PROT SERPL-MCNC: 7.4 G/DL (ref 6.4–8.2)
RBC # BLD AUTO: 4.73 M/UL (ref 4.1–5.7)
SODIUM SERPL-SCNC: 141 MMOL/L (ref 136–145)
TROPONIN I SERPL-MCNC: <0.04 NG/ML
WBC # BLD AUTO: 5.4 K/UL (ref 4.1–11.1)

## 2017-10-10 PROCEDURE — 74011636320 HC RX REV CODE- 636/320: Performed by: INTERNAL MEDICINE

## 2017-10-10 PROCEDURE — 74011250636 HC RX REV CODE- 250/636: Performed by: INTERNAL MEDICINE

## 2017-10-10 PROCEDURE — 82553 CREATINE MB FRACTION: CPT | Performed by: STUDENT IN AN ORGANIZED HEALTH CARE EDUCATION/TRAINING PROGRAM

## 2017-10-10 PROCEDURE — 027034Z DILATION OF CORONARY ARTERY, ONE ARTERY WITH DRUG-ELUTING INTRALUMINAL DEVICE, PERCUTANEOUS APPROACH: ICD-10-PCS | Performed by: INTERNAL MEDICINE

## 2017-10-10 PROCEDURE — C1725 CATH, TRANSLUMIN NON-LASER: HCPCS

## 2017-10-10 PROCEDURE — 4A023N7 MEASUREMENT OF CARDIAC SAMPLING AND PRESSURE, LEFT HEART, PERCUTANEOUS APPROACH: ICD-10-PCS | Performed by: INTERNAL MEDICINE

## 2017-10-10 PROCEDURE — B2151ZZ FLUOROSCOPY OF LEFT HEART USING LOW OSMOLAR CONTRAST: ICD-10-PCS | Performed by: INTERNAL MEDICINE

## 2017-10-10 PROCEDURE — 74011250637 HC RX REV CODE- 250/637: Performed by: INTERNAL MEDICINE

## 2017-10-10 PROCEDURE — 84484 ASSAY OF TROPONIN QUANT: CPT | Performed by: STUDENT IN AN ORGANIZED HEALTH CARE EDUCATION/TRAINING PROGRAM

## 2017-10-10 PROCEDURE — C1887 CATHETER, GUIDING: HCPCS

## 2017-10-10 PROCEDURE — 92941 PRQ TRLML REVSC TOT OCCL AMI: CPT

## 2017-10-10 PROCEDURE — 77030013744

## 2017-10-10 PROCEDURE — C1874 STENT, COATED/COV W/DEL SYS: HCPCS

## 2017-10-10 PROCEDURE — 93041 RHYTHM ECG TRACING: CPT

## 2017-10-10 PROCEDURE — 74011250637 HC RX REV CODE- 250/637: Performed by: STUDENT IN AN ORGANIZED HEALTH CARE EDUCATION/TRAINING PROGRAM

## 2017-10-10 PROCEDURE — 77030004533 HC CATH ANGI DX IMP BSC -B

## 2017-10-10 PROCEDURE — C1894 INTRO/SHEATH, NON-LASER: HCPCS

## 2017-10-10 PROCEDURE — 65620000000 HC RM CCU GENERAL

## 2017-10-10 PROCEDURE — 74011000250 HC RX REV CODE- 250: Performed by: INTERNAL MEDICINE

## 2017-10-10 PROCEDURE — 82550 ASSAY OF CK (CPK): CPT | Performed by: STUDENT IN AN ORGANIZED HEALTH CARE EDUCATION/TRAINING PROGRAM

## 2017-10-10 PROCEDURE — 80053 COMPREHEN METABOLIC PANEL: CPT | Performed by: STUDENT IN AN ORGANIZED HEALTH CARE EDUCATION/TRAINING PROGRAM

## 2017-10-10 PROCEDURE — 93005 ELECTROCARDIOGRAM TRACING: CPT

## 2017-10-10 PROCEDURE — C1769 GUIDE WIRE: HCPCS

## 2017-10-10 PROCEDURE — 85025 COMPLETE CBC W/AUTO DIFF WBC: CPT | Performed by: STUDENT IN AN ORGANIZED HEALTH CARE EDUCATION/TRAINING PROGRAM

## 2017-10-10 PROCEDURE — 99284 EMERGENCY DEPT VISIT MOD MDM: CPT

## 2017-10-10 PROCEDURE — 74011250636 HC RX REV CODE- 250/636: Performed by: STUDENT IN AN ORGANIZED HEALTH CARE EDUCATION/TRAINING PROGRAM

## 2017-10-10 PROCEDURE — B2111ZZ FLUOROSCOPY OF MULTIPLE CORONARY ARTERIES USING LOW OSMOLAR CONTRAST: ICD-10-PCS | Performed by: INTERNAL MEDICINE

## 2017-10-10 PROCEDURE — C1760 CLOSURE DEV, VASC: HCPCS

## 2017-10-10 PROCEDURE — 74011000258 HC RX REV CODE- 258: Performed by: INTERNAL MEDICINE

## 2017-10-10 PROCEDURE — 77030004532 HC CATH ANGI DX IMP BSC -A

## 2017-10-10 PROCEDURE — 99285 EMERGENCY DEPT VISIT HI MDM: CPT

## 2017-10-10 PROCEDURE — 36415 COLL VENOUS BLD VENIPUNCTURE: CPT | Performed by: STUDENT IN AN ORGANIZED HEALTH CARE EDUCATION/TRAINING PROGRAM

## 2017-10-10 RX ORDER — ATROPINE SULFATE 0.1 MG/ML
.5-1 INJECTION INTRAVENOUS AS NEEDED
Status: DISCONTINUED | OUTPATIENT
Start: 2017-10-10 | End: 2017-10-12 | Stop reason: ALTCHOICE

## 2017-10-10 RX ORDER — SODIUM CHLORIDE 0.9 % (FLUSH) 0.9 %
5-10 SYRINGE (ML) INJECTION AS NEEDED
Status: DISCONTINUED | OUTPATIENT
Start: 2017-10-10 | End: 2017-10-12 | Stop reason: HOSPADM

## 2017-10-10 RX ORDER — CLOPIDOGREL 300 MG/1
600 TABLET, FILM COATED ORAL ONCE
Status: DISCONTINUED | OUTPATIENT
Start: 2017-10-10 | End: 2017-10-10

## 2017-10-10 RX ORDER — MORPHINE SULFATE 4 MG/ML
4 INJECTION, SOLUTION INTRAMUSCULAR; INTRAVENOUS ONCE
Status: COMPLETED | OUTPATIENT
Start: 2017-10-10 | End: 2017-10-10

## 2017-10-10 RX ORDER — TRAZODONE HYDROCHLORIDE 50 MG/1
25 TABLET ORAL
Status: DISCONTINUED | OUTPATIENT
Start: 2017-10-10 | End: 2017-10-11

## 2017-10-10 RX ORDER — MIDAZOLAM HYDROCHLORIDE 1 MG/ML
.5-1 INJECTION, SOLUTION INTRAMUSCULAR; INTRAVENOUS
Status: DISCONTINUED | OUTPATIENT
Start: 2017-10-10 | End: 2017-10-12 | Stop reason: ALTCHOICE

## 2017-10-10 RX ORDER — SERTRALINE HYDROCHLORIDE 50 MG/1
200 TABLET, FILM COATED ORAL DAILY
Status: DISCONTINUED | OUTPATIENT
Start: 2017-10-11 | End: 2017-10-11

## 2017-10-10 RX ORDER — GUAIFENESIN 100 MG/5ML
324 LIQUID (ML) ORAL
Status: COMPLETED | OUTPATIENT
Start: 2017-10-10 | End: 2017-10-10

## 2017-10-10 RX ORDER — PRAZOSIN HYDROCHLORIDE 5 MG/1
5 CAPSULE ORAL
Status: DISCONTINUED | OUTPATIENT
Start: 2017-10-10 | End: 2017-10-11

## 2017-10-10 RX ORDER — HEPARIN SODIUM 200 [USP'U]/100ML
2000 INJECTION, SOLUTION INTRAVENOUS AS NEEDED
Status: DISCONTINUED | OUTPATIENT
Start: 2017-10-10 | End: 2017-10-12 | Stop reason: ALTCHOICE

## 2017-10-10 RX ORDER — METOPROLOL TARTRATE 25 MG/1
25 TABLET, FILM COATED ORAL EVERY 12 HOURS
Status: DISCONTINUED | OUTPATIENT
Start: 2017-10-10 | End: 2017-10-12 | Stop reason: HOSPADM

## 2017-10-10 RX ORDER — EPTIFIBATIDE 0.75 MG/ML
2 INJECTION, SOLUTION INTRAVENOUS AS NEEDED
Status: DISCONTINUED | OUTPATIENT
Start: 2017-10-10 | End: 2017-10-12 | Stop reason: ALTCHOICE

## 2017-10-10 RX ORDER — ROSUVASTATIN CALCIUM 10 MG/1
10 TABLET, COATED ORAL
Status: DISCONTINUED | OUTPATIENT
Start: 2017-10-10 | End: 2017-10-12 | Stop reason: HOSPADM

## 2017-10-10 RX ORDER — ARIPIPRAZOLE 5 MG/1
5 TABLET ORAL 2 TIMES DAILY
Status: DISCONTINUED | OUTPATIENT
Start: 2017-10-10 | End: 2017-10-11

## 2017-10-10 RX ORDER — BUTALBITAL, ACETAMINOPHEN AND CAFFEINE 50; 325; 40 MG/1; MG/1; MG/1
1 TABLET ORAL
Status: DISCONTINUED | OUTPATIENT
Start: 2017-10-10 | End: 2017-10-12 | Stop reason: HOSPADM

## 2017-10-10 RX ORDER — LIDOCAINE HYDROCHLORIDE 10 MG/ML
10-30 INJECTION INFILTRATION; PERINEURAL
Status: DISCONTINUED | OUTPATIENT
Start: 2017-10-10 | End: 2017-10-12 | Stop reason: ALTCHOICE

## 2017-10-10 RX ORDER — GABAPENTIN 600 MG/1
600 TABLET ORAL 3 TIMES DAILY
Status: DISCONTINUED | OUTPATIENT
Start: 2017-10-10 | End: 2017-10-11

## 2017-10-10 RX ORDER — SODIUM CHLORIDE 0.9 % (FLUSH) 0.9 %
5-10 SYRINGE (ML) INJECTION AS NEEDED
Status: DISCONTINUED | OUTPATIENT
Start: 2017-10-10 | End: 2017-10-12 | Stop reason: ALTCHOICE

## 2017-10-10 RX ORDER — SODIUM CHLORIDE 0.9 % (FLUSH) 0.9 %
5-10 SYRINGE (ML) INJECTION EVERY 8 HOURS
Status: DISCONTINUED | OUTPATIENT
Start: 2017-10-10 | End: 2017-10-12 | Stop reason: HOSPADM

## 2017-10-10 RX ORDER — HEPARIN SODIUM 1000 [USP'U]/ML
1000-5000 INJECTION, SOLUTION INTRAVENOUS; SUBCUTANEOUS
Status: DISCONTINUED | OUTPATIENT
Start: 2017-10-10 | End: 2017-10-12 | Stop reason: ALTCHOICE

## 2017-10-10 RX ORDER — FENTANYL CITRATE 50 UG/ML
25-200 INJECTION, SOLUTION INTRAMUSCULAR; INTRAVENOUS
Status: DISCONTINUED | OUTPATIENT
Start: 2017-10-10 | End: 2017-10-12 | Stop reason: ALTCHOICE

## 2017-10-10 RX ORDER — SODIUM CHLORIDE 9 MG/ML
25-100 INJECTION, SOLUTION INTRAVENOUS CONTINUOUS
Status: DISCONTINUED | OUTPATIENT
Start: 2017-10-10 | End: 2017-10-12 | Stop reason: HOSPADM

## 2017-10-10 RX ORDER — GUAIFENESIN 100 MG/5ML
81 LIQUID (ML) ORAL DAILY
Status: DISCONTINUED | OUTPATIENT
Start: 2017-10-11 | End: 2017-10-12 | Stop reason: HOSPADM

## 2017-10-10 RX ADMIN — FENTANYL CITRATE 50 MCG: 50 INJECTION, SOLUTION INTRAMUSCULAR; INTRAVENOUS at 16:37

## 2017-10-10 RX ADMIN — MIDAZOLAM HYDROCHLORIDE 2 MG: 1 INJECTION, SOLUTION INTRAMUSCULAR; INTRAVENOUS at 16:37

## 2017-10-10 RX ADMIN — MORPHINE SULFATE 4 MG: 4 INJECTION, SOLUTION INTRAMUSCULAR; INTRAVENOUS at 16:23

## 2017-10-10 RX ADMIN — LIDOCAINE HYDROCHLORIDE 10 ML: 10 INJECTION, SOLUTION INFILTRATION; PERINEURAL at 16:37

## 2017-10-10 RX ADMIN — BIVALIRUDIN 147.35 MG/HR: 250 INJECTION, POWDER, LYOPHILIZED, FOR SOLUTION INTRAVENOUS at 16:44

## 2017-10-10 RX ADMIN — HEPARIN SODIUM 2000 UNITS: 200 INJECTION, SOLUTION INTRAVENOUS at 16:45

## 2017-10-10 RX ADMIN — IOPAMIDOL 50 ML: 755 INJECTION, SOLUTION INTRAVENOUS at 16:45

## 2017-10-10 RX ADMIN — ASPIRIN 81 MG 324 MG: 81 TABLET ORAL at 16:17

## 2017-10-10 RX ADMIN — METOPROLOL TARTRATE 25 MG: 25 TABLET ORAL at 21:03

## 2017-10-10 RX ADMIN — TICAGRELOR 180 MG: 90 TABLET ORAL at 16:53

## 2017-10-10 RX ADMIN — ROSUVASTATIN CALCIUM 10 MG: 10 TABLET, FILM COATED ORAL at 21:02

## 2017-10-10 RX ADMIN — Medication 10 ML: at 21:05

## 2017-10-10 RX ADMIN — IOPAMIDOL 110 ML: 755 INJECTION, SOLUTION INTRAVENOUS at 16:55

## 2017-10-10 RX ADMIN — SODIUM CHLORIDE 100 ML/HR: 900 INJECTION, SOLUTION INTRAVENOUS at 16:21

## 2017-10-10 NOTE — PROGRESS NOTES
Cardiac Cath Lab Procedure Area Arrival Note:    Ivan Adams arrived to Cardiac Cath Lab, Procedure Area. Patient identifiers verified with NAME and DATE OF BIRTH. Procedure verified with patient. Consent forms verified. Allergies verified. Patient informed of procedure and plan of care. Questions answered with review. Patient voiced understanding of procedure and plan of care. Patient on cardiac monitor, non-invasive blood pressure, SPO2 monitor. On O2 @ 2 lpm via nc. IV of ns on pump at 100 ml/hr. Patient status doing well with some problems : STEMI. Patient is A&Ox 3. Patient reports chest pain. Patient medicated during procedure with orders obtained and verified by Dr. Gina Leblanc. Refer to patients Cardiac Cath Lab PROCEDURE REPORT for vital signs, assessment, status, and response during procedure, printed at end of case. Printed report on chart or scanned into chart.

## 2017-10-10 NOTE — PROGRESS NOTES
Spiritual Care Assessment/Progress Notes    Chacho Hernandez [id number]  [id number]    1965  46 y.o.  male    Patient Telephone Number: [phone] (home)   Episcopalian Affiliation: [facility]   Language: English   Extended Emergency Contact Information  Primary Emergency Contact: [address] [place] [address] Phone: [phone]  Relation: Spouse   Patient Active Problem List    Diagnosis Date Noted    Anxiety 12/13/2016    Intractable chronic post-traumatic headache 12/13/2016    ADD (attention deficit disorder) 12/13/2016    Hypovitaminosis D 03/07/2016    Hypercholesteremia 03/07/2016        Date: 10/10/2017       Level of Episcopalian/Spiritual Activity:  []         Involved in foster tradition/spiritual practice    []         Not involved in foster tradition/spiritual practice  [x]         Spiritually oriented    []         Claims no spiritual orientation    []         seeking spiritual identity  []         Feels alienated from Islam practice/tradition  []         Feels angry about Islam practice/tradition  [x]         Spirituality/Islam tradition is a resource for coping at this time.   []         Not able to assess due to medical condition    Services Provided Today:  []         crisis intervention    []         reading Scriptures  [x]         spiritual assessment    []         prayer  [x]         empathic listening/emotional support  []         rites and rituals (cite in comments)  []         life review     []         Islam support  []         theological development   []         advocacy  []         ethical dialog     [x]         blessing  []         bereavement support    [x]         support to family  []         anticipatory grief support   []         help with AMD  []         spiritual guidance    []         meditation      Spiritual Care Needs  [x]         Emotional Support  [x]         Spiritual/Episcopalian Care  []         Loss/Adjustment  [] Advocacy/Referral                /Ethics  []         No needs expressed at               this time  []         Other: (note in               comments)  Spiritual Care Plan  []         Follow up visits with               pt/family  []         Provide materials  []         Schedule sacraments  []         Contact Community               Clergy  [x]         Follow up as needed  []         Other: (note in               comments)     Comments:  responded to Code STEMI in ER 9. Words of support, brief blessing and assurance of prayer provided with patient.  aided in support with pt's wife in escort to waiting area.  provided hand off to chaplain Andrews for cont support.      7613 Akin Coats M.Div, M.S, Kaleigh 608 available at [address] [place] [address]([phone])

## 2017-10-10 NOTE — ED PROVIDER NOTES
HPI Comments: 46 y.o. male with past medical history significant for memory loss, depression, vertigo, and high cholesterol who presents from home with chief complaint of chest pain. Patient complains of \"burning\" chest pain in the center of his chest for one hour. It started suddenly after he got home today. He also developed a headache at the same time the chest pain started. The chest pain originally was 8/10 but is now 5/10. No nausea or cough. No hx of MI. No aspirin. There are no other acute medical concerns at this time. Social hx: Never smoker. No alcohol use. Family hx: Mom had a MI at age 61. PCP: Hever Aguila MD    Note written by Millicent Simms, as dictated by Neva Agustin MD 4:05 PM      The history is provided by the patient. Past Medical History:   Diagnosis Date    Anxiety disorder     Depression     Diarrhea     Falls     Headache     Joint pain     Joint pain     Memory disorder     Memory loss     Muscle pain     Muscle pain     Muscle weakness     Muscle weakness     Torn rotator cuff     right side    Vertigo     Visual disturbance     Visual disturbance        History reviewed. No pertinent surgical history. Family History:   Problem Relation Age of Onset    Heart Disease Mother     Stroke Mother        Social History     Social History    Marital status:      Spouse name: N/A    Number of children: N/A    Years of education: N/A     Occupational History    Not on file. Social History Main Topics    Smoking status: Never Smoker    Smokeless tobacco: Never Used    Alcohol use No    Drug use: Not on file    Sexual activity: Not on file     Other Topics Concern    Not on file     Social History Narrative         ALLERGIES: Review of patient's allergies indicates no known allergies. Review of Systems   Constitutional: Negative for chills, diaphoresis, fatigue and fever.    HENT: Negative for congestion, rhinorrhea, sinus pressure, sore throat, trouble swallowing and voice change. Eyes: Negative for photophobia and visual disturbance. Respiratory: Negative for cough, chest tightness and shortness of breath. Cardiovascular: Positive for chest pain. Negative for palpitations and leg swelling. Gastrointestinal: Negative for abdominal pain, blood in stool, constipation, diarrhea, nausea and vomiting. Musculoskeletal: Negative for arthralgias, myalgias and neck pain. Neurological: Positive for headaches. Negative for dizziness, weakness, light-headedness and numbness. All other systems reviewed and are negative. Vitals:    10/10/17 1548   BP: (!) 140/91   Pulse: 64   Resp: 18   Temp: 98 °F (36.7 °C)   SpO2: 96%   Weight: 84.2 kg (185 lb 9.6 oz)   Height: 5' 5\" (1.651 m)            Physical Exam   Constitutional: He is oriented to person, place, and time. He appears well-developed and well-nourished. No distress. HENT:   Head: Normocephalic and atraumatic. Nose: Nose normal.   Mouth/Throat: Oropharynx is clear and moist. No oropharyngeal exudate. Eyes: Conjunctivae and EOM are normal. Right eye exhibits no discharge. Left eye exhibits no discharge. No scleral icterus. Neck: Normal range of motion. Neck supple. No JVD present. No tracheal deviation present. No thyromegaly present. Cardiovascular: Normal rate, regular rhythm, normal heart sounds and intact distal pulses. Exam reveals no gallop and no friction rub. No murmur heard. Pulmonary/Chest: Effort normal and breath sounds normal. No stridor. No respiratory distress. He has no wheezes. He has no rales. He exhibits no tenderness. Abdominal: Bowel sounds are normal. He exhibits no distension and no mass. There is no tenderness. There is no rebound. Musculoskeletal: Normal range of motion. He exhibits no edema or tenderness. Lymphadenopathy:     He has no cervical adenopathy. Neurological: He is alert and oriented to person, place, and time.  No cranial nerve deficit. Coordination normal.   Skin: Skin is warm and dry. No rash noted. He is not diaphoretic. No erythema. No pallor. Psychiatric: He has a normal mood and affect. His behavior is normal. Judgment and thought content normal.   Nursing note and vitals reviewed. Note written by Millicent Medina, as dictated by Emile Montano MD 4:05 PM    MDM  Number of Diagnoses or Management Options  Diagnosis management comments: ACS, chest pain. 47 y/o with STEMI. Amount and/or Complexity of Data Reviewed  Clinical lab tests: ordered and reviewed  Obtain history from someone other than the patient: yes  Review and summarize past medical records: yes  Discuss the patient with other providers: yes    Risk of Complications, Morbidity, and/or Mortality  Presenting problems: high  Diagnostic procedures: moderate  Management options: moderate    Critical Care  Total time providing critical care: 30-74 minutes (Total critical care time spend exclusive of procedures:  35 min.)    Patient Progress  Patient progress: stable    ED Course       Procedures    PROGRESS NOTE:  4:17 PM  Repeat EKG shows STEMI. Code STEMI. Dr. Delmi Holly is in the ED evaluating the patient. Patient being taken to the cath lab. ED EKG interpretation: Initial  Rhythm: normal sinus rhythm. Inverted T waves in V1. 1 mm of elevation in 2 AVF, borderline in lead 3. Note written by Millicent Medina, as dictated by Henri Parr. Jasmin Chau MD 3:53 PM    ED EKG interpretation: Repeat  Rhythm: normal sinus rhythm. 1 mm of elevation in 2, 3 AVF consistent with STEMI. Note written by Millicent Medina, as dictated by Henri Parr. Jasmin Chau MD 4:13 PM    4:57 PM  Patient is being admitted to the hospital.  The results of their tests and reasons for their admission have been discussed with them and/or available family. They convey agreement and understanding for the need to be admitted and for their admission diagnosis.   Consultation has been made with the inpatient physician specialist for hospitalization. LABORATORY TESTS:  No results found for this or any previous visit (from the past 12 hour(s)). IMAGING RESULTS:  No orders to display     No results found. MEDICATIONS GIVEN:  Medications   0.9% sodium chloride infusion (0 mL/kg/hr × 82.9 kg IntraVENous IV Completed 10/12/17 0800)   0.9% sodium chloride infusion (1.5 mL/kg/hr × 82.9 kg IntraVENous New Bag 10/12/17 0751)   aspirin chewable tablet 324 mg (324 mg Oral Given 10/10/17 1617)   morphine injection 4 mg (4 mg IntraVENous Given 10/10/17 1623)   ticagrelor (BRILINTA) tablet 180 mg (180 mg Oral Given 10/10/17 1653)       IMPRESSION:  No diagnosis found. PLAN:  1.  Admit to Hospitalist    Total critical care time spent exclusive of procedures:  35 minutes      Bailey Birch MD

## 2017-10-10 NOTE — IP AVS SNAPSHOT
[address] 
[phone] Patient: Sirisha Benítez MRN: [id number] :1965 Current Discharge Medication List  
  
START taking these medications Dose & Instructions Dispensing Information Comments Morning Noon Evening Bedtime  
 aspirin 81 mg chewable tablet Your last dose was: Your next dose is:    
   
   
 Dose:  81 mg Take 1 Tab by mouth daily. Quantity:  30 Tab Refills:  6  
     
   
   
   
  
 metoprolol tartrate 25 mg tablet Commonly known as:  LOPRESSOR Your last dose was: Your next dose is:    
   
   
 Dose:  25 mg Take 1 Tab by mouth every twelve (12) hours. Quantity:  60 Tab Refills:  6  
     
   
   
   
  
 ticagrelor 90 mg tablet Commonly known as:  [employer] Your last dose was: Your next dose is:    
   
   
 Dose:  90 mg Take 1 Tab by mouth every twelve (12) hours every twelve (12) hours. Quantity:  60 Tab Refills:  6 CONTINUE these medications which have CHANGED Dose & Instructions Dispensing Information Comments Morning Noon Evening Bedtime * rosuvastatin 10 mg tablet Commonly known as:  CRESTOR What changed:  Another medication with the same name was added. Make sure you understand how and when to take each. Your last dose was: Your next dose is:    
   
   
 Dose:  10 mg Take 1 Tab by mouth nightly. Quantity:  30 Tab Refills:  3  
     
   
   
   
  
 * rosuvastatin 10 mg tablet Commonly known as:  CRESTOR What changed: You were already taking a medication with the same name, and this prescription was added. Make sure you understand how and when to take each. Your last dose was: Your next dose is:    
   
   
 Dose:  10 mg Take 1 Tab by mouth nightly. Quantity:  30 Tab Refills:  6 * Notice: This list has 2 medication(s) that are the same as other medications prescribed for you. Read the directions carefully, and ask your doctor or other care provider to review them with you. CONTINUE these medications which have NOT CHANGED Dose & Instructions Dispensing Information Comments Morning Noon Evening Bedtime  
 traZODone 50 mg tablet Commonly known as:  Zach Bump Your last dose was: Your next dose is:    
   
   
 Dose:  25 mg Take 25 mg by mouth nightly. Refills:  0  
     
   
   
   
  
 ZOLOFT 100 mg tablet Generic drug:  sertraline Your last dose was: Your next dose is:    
   
   
 Dose:  200 mg Take 200 mg by mouth daily. Refills:  0 STOP taking these medications ABILIFY 2 mg tablet Generic drug:  ARIPiprazole  
   
  
 cyclobenzaprine 10 mg tablet Commonly known as:  FLEXERIL  
   
  
 FIORICET -40 mg per tablet Generic drug:  butalbital-acetaminophen-caffeine  
   
  
 gabapentin 600 mg tablet Commonly known as:  NEURONTIN  
   
  
 lovastatin 40 mg tablet Commonly known as:  MEVACOR  
   
  
 NUEDEXTA 20-10 mg per capsule Generic drug:  dextromethorphan-quiNIDine  
   
  
 prazosin 2 mg capsule Commonly known as:  MINIPRESS Where to Get Your Medications Information on where to get these meds will be given to you by the nurse or doctor. ! Ask your nurse or doctor about these medications  
  aspirin 81 mg chewable tablet  
 metoprolol tartrate 25 mg tablet  
 rosuvastatin 10 mg tablet  
 ticagrelor 90 mg tablet

## 2017-10-10 NOTE — PROGRESS NOTES
Cardiac Cath Lab Procedure Area Arrival Note:    Rayshawn Mccartney arrived to Cardiac Cath Lab, Procedure Area. Patient identifiers verified with NAME and DATE OF BIRTH. Procedure verified with patient. Consent forms verified. Allergies verified. Patient informed of procedure and plan of care. Questions answered with review. Patient voiced understanding of procedure and plan of care. Patient on cardiac monitor, non-invasive blood pressure, SPO2 monitor. On   or O2 @ 2 lpm via NC.  IV of NS on pump at 100 ml/hr. Patient status doing well without problems. Patient is A&Ox 3. Patient reports 5/10. Patient medicated during procedure with orders obtained and verified by Dr. Monica Kelley.    Per wife, pt does have periods of confusion and memory loss r/t an accident at work 2 years ago. Currently patient is oriented and tearful    Refer to patients Cardiac Cath Lab PROCEDURE REPORT for vital signs, assessment, status, and response during procedure, printed at end of case. Printed report on chart or scanned into chart.

## 2017-10-10 NOTE — PROGRESS NOTES
Accepted continuation of pastoral care from [facility] and guided wife Lorelei Olvera to Cath Lab where words of comfort and assurance were provided. Shared her tearful worry about her ---who had been steadily recovering from a work related brain injury for the past several years and was poised to return to work. He has become engaged in his foster at a deeper level which is encouraging to Lorelei Olvera. Family has 3 children and Carrie's mother works at [employer]. Assisted in providing activities to engage 2 children who were in the cafeteria and guided family to CCU waiting area where Jay Hale assumed care. [facility]'s Staff  (Jahaira Whitman Patient Care Specialist)   Paging Service [phone]([phone])

## 2017-10-10 NOTE — PROGRESS NOTES
Per day 's referral, visited with pt's family (wife, and two daughters) in CCU waiting and then visited with pt in CCU 21; wife was tearful and was on the phone to talk to her [employer], she reported having support from Synagogue; provided emotional support to wife, shewas appreciative of the visit; visited briefly with pt in his room, pt though opened eyes and communicated with hand, understanding that his family was in the waiting room and to concerned about him; Spiritual Care to follow up as needed. Rev.  Rukhsana Posey, Ph.D., M.Div., M.A.,   /Director of [address] [facility]  Paging Service: [phone])

## 2017-10-10 NOTE — ED TRIAGE NOTES
TRIAGE NOTE: Pt arrives for mid chest pain without radiation since 1430. Pt reports diaphoresis at the time with shortness of breath. Denies nausea/vomiting.

## 2017-10-10 NOTE — IP AVS SNAPSHOT
2700 [facility] [address] [place] [address] 
[phone] Patient: Chacho Hernandez MRN: [id number] :1965 You are allergic to the following No active allergies Recent Documentation Height Weight BMI Smoking Status 1.651 m 82.9 kg 30.41 kg/m2 Never Smoker Emergency Contacts Name Discharge Info Relation Home Work Mobile Cali Bowers  Spouse [3] [phone] About your hospitalization You were admitted on:  October 10, 2017 You last received care in the:  [address] [place] [address] [facility] UNIT You were discharged on:  2017 Unit phone number:  [phone] Why you were hospitalized Your primary diagnosis was:  Not on File Your diagnoses also included:  Chest Pain, St Elevation (Stemi) Myocardial Infarction Involving Right Coronary Artery (Hcc) Providers Seen During Your Hospitalizations Provider Role Specialty Primary office phone Pipo Iraheta MD Attending Provider Emergency Medicine [phone] David Pichardo MD Attending Provider Cardiology [phone] Your Primary Care Physician (PCP) Primary Care Physician Office Phone Office Fax Diana Glover [phone] Follow-up Information Follow up With Details Comments Contact Info Cheral Boxer, MD On 10/19/2017  Follow-up appointment at 9:30AM [address] [address] 
[phone] [facility]5 [facility] On 10/14/2017 Please notify agency if you have not heard from them by 11:00 am 921 [facility] [address] [address] [phone] David Pichardo MD Schedule an appointment as soon as possible for a visit in 2 weeks Follow-up appointment [address] [address] 100 1400 [address] [place] [address] 
[phone] Current Discharge Medication List  
  
START taking these medications Dose & Instructions Dispensing Information Comments Morning Noon Evening Bedtime  
 aspirin 81 mg chewable tablet Your last dose was: Your next dose is:    
   
   
 Dose:  81 mg Take 1 Tab by mouth daily. Quantity:  30 Tab Refills:  6  
     
   
   
   
  
 metoprolol tartrate 25 mg tablet Commonly known as:  LOPRESSOR Your last dose was: Your next dose is:    
   
   
 Dose:  25 mg Take 1 Tab by mouth every twelve (12) hours. Quantity:  60 Tab Refills:  6  
     
   
   
   
  
 ticagrelor 90 mg tablet Commonly known as:  [employer] Your last dose was: Your next dose is:    
   
   
 Dose:  90 mg Take 1 Tab by mouth every twelve (12) hours every twelve (12) hours. Quantity:  60 Tab Refills:  6 CONTINUE these medications which have CHANGED Dose & Instructions Dispensing Information Comments Morning Noon Evening Bedtime * rosuvastatin 10 mg tablet Commonly known as:  CRESTOR What changed:  Another medication with the same name was added. Make sure you understand how and when to take each. Your last dose was: Your next dose is:    
   
   
 Dose:  10 mg Take 1 Tab by mouth nightly. Quantity:  30 Tab Refills:  3  
     
   
   
   
  
 * rosuvastatin 10 mg tablet Commonly known as:  CRESTOR What changed: You were already taking a medication with the same name, and this prescription was added. Make sure you understand how and when to take each. Your last dose was: Your next dose is:    
   
   
 Dose:  10 mg Take 1 Tab by mouth nightly. Quantity:  30 Tab Refills:  6  
     
   
   
   
  
 * Notice: This list has 2 medication(s) that are the same as other medications prescribed for you. Read the directions carefully, and ask your doctor or other care provider to review them with you. CONTINUE these medications which have NOT CHANGED Dose & Instructions Dispensing Information Comments Morning Noon Evening Bedtime  
 traZODone 50 mg tablet Commonly known as:  Zach Bump Your last dose was: Your next dose is:    
   
   
 Dose:  25 mg Take 25 mg by mouth nightly. Refills:  0  
     
   
   
   
  
 ZOLOFT 100 mg tablet Generic drug:  sertraline Your last dose was: Your next dose is:    
   
   
 Dose:  200 mg Take 200 mg by mouth daily. Refills:  0 STOP taking these medications ABILIFY 2 mg tablet Generic drug:  ARIPiprazole  
   
  
 cyclobenzaprine 10 mg tablet Commonly known as:  FLEXERIL  
   
  
 FIORICET -40 mg per tablet Generic drug:  butalbital-acetaminophen-caffeine  
   
  
 gabapentin 600 mg tablet Commonly known as:  NEURONTIN  
   
  
 lovastatin 40 mg tablet Commonly known as:  MEVACOR  
   
  
 NUEDEXTA 20-10 mg per capsule Generic drug:  dextromethorphan-quiNIDine  
   
  
 prazosin 2 mg capsule Commonly known as:  MINIPRESS Where to Get Your Medications Information on where to get these meds will be given to you by the nurse or doctor. ! Ask your nurse or doctor about these medications  
  aspirin 81 mg chewable tablet  
 metoprolol tartrate 25 mg tablet  
 rosuvastatin 10 mg tablet  
 ticagrelor 90 mg tablet Discharge Instructions [email or web address] Patient Discharge Instructions Chacho Hernandez / [id number] : 1965 Admitted 10/10/2017 Discharged: 10/12/2017 · It is important that you take the medication exactly as they are prescribed. · Keep your medication in the bottles provided by the pharmacist and keep a list of the medication names, dosages, and times to be taken in your wallet. · Do not take other medications without consulting your doctor. BRING ALL OF YOUR MEDICINES TO YOUR OFFICE VISIT with Dr. Sol Hearing with David Pichardo MD in 2 week Cardiac Catheterization  Discharge Instructions ? Do not drive, operate any machinery, or sign any legal documents for 24 hours after your procedure. You must have someone to drive you home. ? You may take a shower 24 hours after your cardiac catheterization. Be sure to get the dressing wet and then remove it; gently wash the area with warm soapy water. Pat dry and leave open to air. To help prevent infections, be sure to keep the cath site clean and dry. No lotions, creams, powders, ointments, etc. in the cath site for approximately 1 week. ? Do not take a tub bath, get in a hot tub or swimming pool for approximately 5 days or until the cath site is completely healed. ? No strenuous activity or heavy lifting over 10 lbs. for 7 days. ? Drink plenty of fluids for 24-48 hours after your cath to flush the contrast dye from your kidneys. No alcoholic beverages for 24 hours. You may resume your previous diet (low fat, low cholesterol) after your cath. ? After your cath, some bruising or discomfort is common during the healing process. Tylenol, 1-2 tablets every 6 hours as needed, is recommended if you experience any discomfort. If you experience any signs or symptoms of infection such as fever, chills, or poorly healing incision, persistent tenderness or swelling in the groin, redness and/or warmth to the touch, numbness, significant tingling or pain at the groin site or affected extremity, rash, drainage from the cath site, or if the leg feels tight or swollen, call your physician right away. ? If bleeding at the cath site occurs, take a clean gauze pad and apply direct pressure to the groin just above the puncture site. Call 911 immediately, and continue to apply direct pressure until an ambulance gets to your location. ? You may return to work  2  days after your cardiac cath if no groin bleeding. Information obtained by : 
I understand that if any problems occur once I am at home I am to contact my physician. I understand and acknowledge receipt of the instructions indicated above. R.N.'s Signature                                                                  Date/Time Patient or Representative Signature                                                          Date/Time Rush Citizen, MD 
 
 
Discharge Orders None [facility] Announcement We are excited to announce that we are making your provider's discharge notes available to you in [facility]. You will see these notes when they are completed and signed by the physician that discharged you from your recent hospital stay. If you have any questions or concerns about any information you see in [facility], please call the Health Information Department where you were seen or reach out to your Primary Care Provider for more information about your plan of care. Introducing [facility]! Dear Sanket Jean: 
Thank you for requesting a [facility] account. Our records indicate that you already have an active [facility] account. You can access your account anytime at [email or web address] Did you know that you can access your hospital and ER discharge instructions at any time in [facility]? You can also review all of your test results from your hospital stay or ER visit. Additional Information If you have questions, please visit the Frequently Asked Questions section of the [facility] website at [email or web address] Remember, [facility] is NOT to be used for urgent needs. For medical emergencies, dial 911. Now available from your iPhone and Android! General Information Please provide this summary of care documentation to your next provider. Patient Signature:  ____________________________________________________________ Date:  ____________________________________________________________  
  
Aloma Snellen Provider Signature:  ____________________________________________________________ Date:  ____________________________________________________________

## 2017-10-10 NOTE — ED NOTES
1613-Repeat EKG obtained. STEMI called. 1618-Dr. Marrufo at bedside. Patient medicated with ASA. Nursing Supervisor and cath lab nurse at bedside.

## 2017-10-10 NOTE — PROGRESS NOTES
1700- TRANSFER - IN REPORT:  Verbal report received from SUZANNA Roblero(name) on Ramon  being received from [place]) for routine progression of care   Report consisted of patients Situation, Background, Assessment and   Recommendations(SBAR). Information from the following report(s) SBAR, ED Summary, Procedure Summary, Intake/Output, Recent Results and Cardiac Rhythm NSR was reviewed with the receiving nurse. Opportunity for questions and clarification was provided. Assessment completed upon patients arrival to unit and care assumed. 1741- Primary Nurse Monie Velarde RN and Hina Doyle RN performed a dual skin assessment on this patient No impairment noted  Cristobal score is 19    Cath site clean/dry/intact with no indication of a hematoma; Pt drowsy but oriented; Post-cath EKG performed; family updated and at the bedside. 1930- Bedside shift change report given to Wendie Nair RN (oncoming nurse) by SUZANNA Roblero (offgoing nurse). Report included the following information SBAR, ED Summary, Intake/Output, Recent Results and Cardiac Rhythm NSR w/ ST elevation.

## 2017-10-11 LAB
ANION GAP SERPL CALC-SCNC: 12 MMOL/L (ref 5–15)
ATRIAL RATE: 59 BPM
ATRIAL RATE: 60 BPM
ATRIAL RATE: 62 BPM
ATRIAL RATE: 66 BPM
BASOPHILS # BLD: 0 K/UL (ref 0–0.1)
BASOPHILS NFR BLD: 0 % (ref 0–1)
BUN SERPL-MCNC: 13 MG/DL (ref 6–20)
BUN/CREAT SERPL: 14 (ref 12–20)
CALCIUM SERPL-MCNC: 7.9 MG/DL (ref 8.5–10.1)
CALCULATED P AXIS, ECG09: 29 DEGREES
CALCULATED P AXIS, ECG09: 33 DEGREES
CALCULATED P AXIS, ECG09: 56 DEGREES
CALCULATED P AXIS, ECG09: 62 DEGREES
CALCULATED R AXIS, ECG10: 15 DEGREES
CALCULATED R AXIS, ECG10: 35 DEGREES
CALCULATED R AXIS, ECG10: 44 DEGREES
CALCULATED R AXIS, ECG10: 48 DEGREES
CALCULATED T AXIS, ECG11: 27 DEGREES
CALCULATED T AXIS, ECG11: 43 DEGREES
CALCULATED T AXIS, ECG11: 57 DEGREES
CALCULATED T AXIS, ECG11: 61 DEGREES
CHLORIDE SERPL-SCNC: 106 MMOL/L (ref 97–108)
CO2 SERPL-SCNC: 24 MMOL/L (ref 21–32)
CREAT SERPL-MCNC: 0.92 MG/DL (ref 0.7–1.3)
DIAGNOSIS, 93000: NORMAL
EOSINOPHIL # BLD: 0.1 K/UL (ref 0–0.4)
EOSINOPHIL NFR BLD: 2 % (ref 0–7)
ERYTHROCYTE [DISTWIDTH] IN BLOOD BY AUTOMATED COUNT: 13.7 % (ref 11.5–14.5)
GLUCOSE SERPL-MCNC: 98 MG/DL (ref 65–100)
HCT VFR BLD AUTO: 38.5 % (ref 36.6–50.3)
HGB BLD-MCNC: 12.7 G/DL (ref 12.1–17)
LYMPHOCYTES # BLD: 1.3 K/UL (ref 0.8–3.5)
LYMPHOCYTES NFR BLD: 17 % (ref 12–49)
MCH RBC QN AUTO: 28.7 PG (ref 26–34)
MCHC RBC AUTO-ENTMCNC: 33 G/DL (ref 30–36.5)
MCV RBC AUTO: 87.1 FL (ref 80–99)
MONOCYTES # BLD: 0.7 K/UL (ref 0–1)
MONOCYTES NFR BLD: 9 % (ref 5–13)
NEUTS SEG # BLD: 5.8 K/UL (ref 1.8–8)
NEUTS SEG NFR BLD: 72 % (ref 32–75)
P-R INTERVAL, ECG05: 158 MS
P-R INTERVAL, ECG05: 158 MS
P-R INTERVAL, ECG05: 164 MS
P-R INTERVAL, ECG05: 170 MS
PLATELET # BLD AUTO: 201 K/UL (ref 150–400)
POTASSIUM SERPL-SCNC: 3.5 MMOL/L (ref 3.5–5.1)
Q-T INTERVAL, ECG07: 412 MS
Q-T INTERVAL, ECG07: 414 MS
Q-T INTERVAL, ECG07: 418 MS
Q-T INTERVAL, ECG07: 438 MS
QRS DURATION, ECG06: 76 MS
QRS DURATION, ECG06: 82 MS
QRS DURATION, ECG06: 86 MS
QRS DURATION, ECG06: 88 MS
QTC CALCULATION (BEZET), ECG08: 412 MS
QTC CALCULATION (BEZET), ECG08: 424 MS
QTC CALCULATION (BEZET), ECG08: 433 MS
QTC CALCULATION (BEZET), ECG08: 434 MS
RBC # BLD AUTO: 4.42 M/UL (ref 4.1–5.7)
SODIUM SERPL-SCNC: 142 MMOL/L (ref 136–145)
TROPONIN I SERPL-MCNC: 6.12 NG/ML
TROPONIN I SERPL-MCNC: 9.74 NG/ML
VENTRICULAR RATE, ECG03: 59 BPM
VENTRICULAR RATE, ECG03: 60 BPM
VENTRICULAR RATE, ECG03: 62 BPM
VENTRICULAR RATE, ECG03: 66 BPM
WBC # BLD AUTO: 7.9 K/UL (ref 4.1–11.1)

## 2017-10-11 PROCEDURE — 84484 ASSAY OF TROPONIN QUANT: CPT | Performed by: INTERNAL MEDICINE

## 2017-10-11 PROCEDURE — 85025 COMPLETE CBC W/AUTO DIFF WBC: CPT | Performed by: INTERNAL MEDICINE

## 2017-10-11 PROCEDURE — 80048 BASIC METABOLIC PNL TOTAL CA: CPT | Performed by: INTERNAL MEDICINE

## 2017-10-11 PROCEDURE — 93005 ELECTROCARDIOGRAM TRACING: CPT

## 2017-10-11 PROCEDURE — 65660000000 HC RM CCU STEPDOWN

## 2017-10-11 PROCEDURE — 36415 COLL VENOUS BLD VENIPUNCTURE: CPT | Performed by: INTERNAL MEDICINE

## 2017-10-11 PROCEDURE — 74011250637 HC RX REV CODE- 250/637: Performed by: INTERNAL MEDICINE

## 2017-10-11 RX ADMIN — TICAGRELOR 90 MG: 90 TABLET ORAL at 06:54

## 2017-10-11 RX ADMIN — ROSUVASTATIN CALCIUM 10 MG: 10 TABLET, FILM COATED ORAL at 21:57

## 2017-10-11 RX ADMIN — ASPIRIN 81 MG 81 MG: 81 TABLET ORAL at 08:06

## 2017-10-11 RX ADMIN — METOPROLOL TARTRATE 25 MG: 25 TABLET ORAL at 08:06

## 2017-10-11 RX ADMIN — Medication 10 ML: at 06:54

## 2017-10-11 RX ADMIN — METOPROLOL TARTRATE 25 MG: 25 TABLET ORAL at 21:57

## 2017-10-11 RX ADMIN — Medication 10 ML: at 22:00

## 2017-10-11 NOTE — CDMP QUERY
Patient is noted to have a BMI of 32.76 kg/m 2  Please clarify if this patient is:     =>Obesity (BMI of 30-39.9)  => Morbid Obesity  (BMI 40 or greater)  =>Overweight (BMI 25-29.9)  => Other weight status (specify  status)  => Unable to determine    The [address] [place] [address] has issued a statement indicating that, \"Individuals who are overweight, obese, or morbidly obese are at an increased risk for certain medical conditions when compared to persons of normal weight.  Therefore, these conditions are always clinically significant and reportable when documented by the provider. \"      Presentation:  Ht: 5' 5\" (1.651 m)  Wt: 89.3 kg (196 lb 13.9 oz)          Please clarify and document your clinical opinion in the progress notes and discharge summary, including the definitive and or presumptive diagnosis, (suspected or probable), related to the above clinical findings. Please include clinical findings supporting your diagnosis.        Thank you,         Vahid Ruiz [facility]

## 2017-10-11 NOTE — PROGRESS NOTES
Cardiology Progress Note                                        Admit Date: 10/10/2017    Assessment/Plan:     STEMI; stable; noted troponin  CAD; still has significant LAD lesion; will electively intervene in am  Hyperlipidemia  HTN; stable; just BB is all he needs    Abraham Zurita is a 46 y.o. male with     PROBLEM LIST:  Patient Active Problem List    Diagnosis Date Noted    Chest pain 10/10/2017     Priority: 1 - One    ST elevation (STEMI) myocardial infarction involving right coronary artery ([address] 10/10/2017    Anxiety 12/13/2016    Intractable chronic post-traumatic headache 12/13/2016    ADD (attention deficit disorder) 12/13/2016    Hypovitaminosis D 03/07/2016    Hypercholesteremia 03/07/2016         Subjective:     Abraham Zurita reports none. Visit Vitals    /69    Pulse 61    Temp 98.6 °F (37 °C)    Resp 10    Ht 5' 5\" (1.651 m)    Wt 89.3 kg (196 lb 13.9 oz)    SpO2 99%    BMI 32.76 kg/m2       Intake/Output Summary (Last 24 hours) at 10/11/17 0601  Last data filed at 10/11/17 0400   Gross per 24 hour   Intake          1173.36 ml   Output              650 ml   Net           523.36 ml       Objective:      Physical Exam:  HEENT: Perrla, EOMI  Neck: No JVD,  No thyroidmegaly  Resp: CTA bilaterally;  No wheezes or rales  CV: RRR s1s2 No murmur no s3  Abd:Soft, Nontender  Ext: No edema  Neuro: Alert and oriented; Nonfocal  Skin: Warm, Dry, Intact  Pulses: 2+ DP/PT/Rad      Telemetry: normal sinus rhythm    Current Facility-Administered Medications   Medication Dose Route Frequency    0.9% sodium chloride infusion   mL/hr IntraVENous CONTINUOUS    atropine injection 0.5-1 mg  0.5-1 mg IntraVENous PRN    bivalirudin (ANGIOMAX) 250 mg in 0.9% sodium chloride (MBP/ADV) 50 mL  1.75 mg/kg/hr IntraVENous PRN    eptifibatide (INTEGRILIN) 0.75 mg/mL infusion  2 mcg/kg/min IntraVENous PRN    fentaNYL citrate (PF) injection  mcg   mcg IntraVENous Multiple    heparin (porcine) 1,000 unit/mL injection 1,000-5,000 Units  1,000-5,000 Units IntraVENous Multiple    heparinized saline 2 units/mL infusion 2,000 Units  2,000 Units Irrigation PRN    iopamidol (ISOVUE-370) 76 % injection 150-300 mL  150-300 mL IntraVENous PRN    iopamidol (ISOVUE-370) 76 % injection 1-50 mL  1-50 mL IntraVENous PRN    lidocaine (XYLOCAINE) 10 mg/mL (1 %) injection 10-30 mL  10-30 mL SubCUTAneous Multiple    midazolam (VERSED) injection 0.5-10 mg  0.5-10 mg IntraVENous Multiple    nitroglycerin 100 mcg/ml compounded injection  1 Vial IntraCORONary Multiple    saline peripheral flush soln 5-10 mL  5-10 mL InterCATHeter PRN    ticagrelor (BRILINTA) 90 mg tablet        butalbital-acetaminophen-caffeine (FIORICET, ESGIC) -40 mg per tablet 1 Tab  1 Tab Oral Q6H PRN    . PHARMACY TO SUBSTITUTE PER PROTOCOL    Per Protocol    rosuvastatin (CRESTOR) tablet 10 mg  10 mg Oral QHS    sodium chloride (NS) flush 5-10 mL  5-10 mL IntraVENous Q8H    sodium chloride (NS) flush 5-10 mL  5-10 mL IntraVENous PRN    metoprolol tartrate (LOPRESSOR) tablet 25 mg  25 mg Oral Q12H    aspirin chewable tablet 81 mg  81 mg Oral DAILY    ticagrelor (BRILINTA) tablet 90 mg  90 mg Oral Q12H         Data Review:   Labs:    Recent Results (from the past 24 hour(s))   EKG, 12 LEAD, INITIAL    Collection Time: 10/10/17  3:53 PM   Result Value Ref Range    Ventricular Rate 62 BPM    Atrial Rate 62 BPM    P-R Interval 158 ms    QRS Duration 82 ms    Q-T Interval 418 ms    QTC Calculation (Bezet) 424 ms    Calculated P Axis 29 degrees    Calculated R Axis 44 degrees    Calculated T Axis 61 degrees    Diagnosis       Normal sinus rhythm  Nonspecific ST abnormality  No previous ECGs available     CBC WITH AUTOMATED DIFF    Collection Time: 10/10/17  4:00 PM   Result Value Ref Range    WBC 5.4 4.1 - 11.1 K/uL    RBC 4.73 4.10 - 5.70 M/uL    HGB 13.6 12.1 - 17.0 g/dL    HCT 41.1 36.6 - 50.3 %    MCV 86.9 80.0 - 99.0 FL    MCH 28.8 26.0 - 34.0 PG    MCHC 33.1 30.0 - 36.5 g/dL    RDW 13.6 11.5 - 14.5 %    PLATELET 367 291 - 092 K/uL    NEUTROPHILS 65 32 - 75 %    LYMPHOCYTES 25 12 - 49 %    MONOCYTES 6 5 - 13 %    EOSINOPHILS 3 0 - 7 %    BASOPHILS 1 0 - 1 %    ABS. NEUTROPHILS 3.6 1.8 - 8.0 K/UL    ABS. LYMPHOCYTES 1.4 0.8 - 3.5 K/UL    ABS. MONOCYTES 0.3 0.0 - 1.0 K/UL    ABS. EOSINOPHILS 0.2 0.0 - 0.4 K/UL    ABS. BASOPHILS 0.0 0.0 - 0.1 K/UL   METABOLIC PANEL, COMPREHENSIVE    Collection Time: 10/10/17  4:00 PM   Result Value Ref Range    Sodium 141 136 - 145 mmol/L    Potassium 3.9 3.5 - 5.1 mmol/L    Chloride 105 97 - 108 mmol/L    CO2 27 21 - 32 mmol/L    Anion gap 9 5 - 15 mmol/L    Glucose 128 (H) 65 - 100 mg/dL    BUN 14 6 - 20 MG/DL    Creatinine 1.15 0.70 - 1.30 MG/DL    BUN/Creatinine ratio 12 12 - 20      GFR est AA >60 >60 ml/min/1.73m2    GFR est non-AA >60 >60 ml/min/1.73m2    Calcium 8.6 8.5 - 10.1 MG/DL    Bilirubin, total 1.0 0.2 - 1.0 MG/DL    ALT (SGPT) 33 12 - 78 U/L    AST (SGOT) 26 15 - 37 U/L    Alk. phosphatase 80 45 - 117 U/L    Protein, total 7.4 6.4 - 8.2 g/dL    Albumin 3.7 3.5 - 5.0 g/dL    Globulin 3.7 2.0 - 4.0 g/dL    A-G Ratio 1.0 (L) 1.1 - 2.2     TROPONIN I    Collection Time: 10/10/17  4:00 PM   Result Value Ref Range    Troponin-I, Qt. <0.04 <0.05 ng/mL   CK W/ REFLX CKMB    Collection Time: 10/10/17  4:00 PM   Result Value Ref Range     (H) 39 - 308 U/L   CK-MB,QUANT.     Collection Time: 10/10/17  4:00 PM   Result Value Ref Range    CK - MB 2.0 <3.6 NG/ML    CK-MB Index 0.5 0 - 2.5     EKG, 12 LEAD, INITIAL    Collection Time: 10/10/17  4:13 PM   Result Value Ref Range    Ventricular Rate 66 BPM    Atrial Rate 66 BPM    P-R Interval 158 ms    QRS Duration 88 ms    Q-T Interval 414 ms    QTC Calculation (Bezet) 434 ms    Calculated P Axis 33 degrees    Calculated R Axis 35 degrees    Calculated T Axis 43 degrees    Diagnosis       Normal sinus rhythm  ST elevation, consider inferior injury or acute infarct  ** ** ACUTE MI / STEMI ** **  When compared with ECG of 10-OCT-2017 15:56,  MANUAL COMPARISON REQUIRED, DATA IS UNCONFIRMED     ECG RHYTHM ANALYSIS ADULT    Collection Time: 10/10/17  5:16 PM   Result Value Ref Range    Ventricular Rate 59 BPM    Atrial Rate 59 BPM    P-R Interval 164 ms    QRS Duration 86 ms    Q-T Interval 438 ms    QTC Calculation (Bezet) 433 ms    Calculated P Axis 62 degrees    Calculated R Axis 48 degrees    Calculated T Axis 57 degrees    Diagnosis       Sinus bradycardia  Early repolarization  When compared with ECG of 10-OCT-2017 16:13,  No significant change was found     METABOLIC PANEL, BASIC    Collection Time: 10/11/17  2:31 AM   Result Value Ref Range    Sodium 142 136 - 145 mmol/L    Potassium 3.5 3.5 - 5.1 mmol/L    Chloride 106 97 - 108 mmol/L    CO2 24 21 - 32 mmol/L    Anion gap 12 5 - 15 mmol/L    Glucose 98 65 - 100 mg/dL    BUN 13 6 - 20 MG/DL    Creatinine 0.92 0.70 - 1.30 MG/DL    BUN/Creatinine ratio 14 12 - 20      GFR est AA >60 >60 ml/min/1.73m2    GFR est non-AA >60 >60 ml/min/1.73m2    Calcium 7.9 (L) 8.5 - 10.1 MG/DL   CBC WITH AUTOMATED DIFF    Collection Time: 10/11/17  2:31 AM   Result Value Ref Range    WBC 7.9 4.1 - 11.1 K/uL    RBC 4.42 4.10 - 5.70 M/uL    HGB 12.7 12.1 - 17.0 g/dL    HCT 38.5 36.6 - 50.3 %    MCV 87.1 80.0 - 99.0 FL    MCH 28.7 26.0 - 34.0 PG    MCHC 33.0 30.0 - 36.5 g/dL    RDW 13.7 11.5 - 14.5 %    PLATELET 740 820 - 921 K/uL    NEUTROPHILS 72 32 - 75 %    LYMPHOCYTES 17 12 - 49 %    MONOCYTES 9 5 - 13 %    EOSINOPHILS 2 0 - 7 %    BASOPHILS 0 0 - 1 %    ABS. NEUTROPHILS 5.8 1.8 - 8.0 K/UL    ABS. LYMPHOCYTES 1.3 0.8 - 3.5 K/UL    ABS. MONOCYTES 0.7 0.0 - 1.0 K/UL    ABS. EOSINOPHILS 0.1 0.0 - 0.4 K/UL    ABS.  BASOPHILS 0.0 0.0 - 0.1 K/UL   TROPONIN I    Collection Time: 10/11/17  2:31 AM   Result Value Ref Range    Troponin-I, Qt. 6.12 (H) <0.05 ng/mL

## 2017-10-11 NOTE — CARDIO/PULMONARY
Cardiac Wellness: MI education folder with catheterization brochure to bedside of Arnoldo Branch. Pt. Currently off floor. Will continue to follow and see tomorrow for MI and CAD education.  Claudeen Elders, RN

## 2017-10-11 NOTE — INTERDISCIPLINARY ROUNDS
IDR/SLIDR Summary          Patient: Fito Garza MRN: [id number]    Age: 46 y.o. YOB: 1965 Room/Bed: [address] [place] [address]   Admit Diagnosis: Chest pain  ST elevation (STEMI) myocardial infarction involving right coronary artery (HCC)  Principal Diagnosis: <principal problem not specified>   Goals: no chest pain   Readmission: NO  Quality Measure: AMI  VTE Prophylaxis: Chemical  Influenza Vaccine screening completed? YES  Pneumococcal Vaccine screening completed? YES  Mobility needs: No   Nutrition plan:Yes  Consults:Case Management    Financial concerns:Yes  Escalated to CM? YES  RRAT Score: ***   Interventions:{Intervention:51053}  Testing due for pt today?  YES  LOS: 0 days Expected length of stay 2 days  Discharge plan: home   PCP: Erik Ricketts MD  Transportation needs: No    Days before discharge:two or more days before discharge   Discharge disposition: Home    Signed:     Howard Horn RN  10/10/2017  8:49 PM

## 2017-10-11 NOTE — PROGRESS NOTES
Problem: Falls - Risk of  Goal: *Absence of Falls  Document Vanessa Fall Risk and appropriate interventions in the flowsheet.    Outcome: Progressing Towards Goal  Fall Risk Interventions:              Medication Interventions: Teach patient to arise slowly, Patient to call before getting OOB     Elimination Interventions: Call light in reach, Patient to call for help with toileting needs

## 2017-10-11 NOTE — PROGRESS NOTES
TRANSFER - IN REPORT:    Verbal report received from Gisell(name) on Bridget Witt  being received from CCU(unit) for routine progression of care      Report consisted of patients Situation, Background, Assessment and   Recommendations(SBAR). Information from the following report(s) SBAR, Kardex and MAR was reviewed with the receiving nurse. Opportunity for questions and clarification was provided. Assessment completed upon patients arrival to unit and care assumed.

## 2017-10-12 ENCOUNTER — HOME HEALTH ADMISSION (OUTPATIENT)
Dept: HOME HEALTH SERVICES | Facility: HOME HEALTH | Age: 52
End: 2017-10-12

## 2017-10-12 VITALS
RESPIRATION RATE: 14 BRPM | DIASTOLIC BLOOD PRESSURE: 78 MMHG | HEART RATE: 67 BPM | HEIGHT: 65 IN | TEMPERATURE: 98 F | BODY MASS INDEX: 30.45 KG/M2 | OXYGEN SATURATION: 95 % | SYSTOLIC BLOOD PRESSURE: 119 MMHG | WEIGHT: 182.76 LBS

## 2017-10-12 LAB
ATRIAL RATE: 61 BPM
CALCULATED P AXIS, ECG09: 113 DEGREES
CALCULATED R AXIS, ECG10: 164 DEGREES
CALCULATED T AXIS, ECG11: 180 DEGREES
DIAGNOSIS, 93000: NORMAL
P-R INTERVAL, ECG05: 164 MS
Q-T INTERVAL, ECG07: 410 MS
QRS DURATION, ECG06: 84 MS
QTC CALCULATION (BEZET), ECG08: 412 MS
VENTRICULAR RATE, ECG03: 61 BPM

## 2017-10-12 PROCEDURE — 74011250636 HC RX REV CODE- 250/636: Performed by: INTERNAL MEDICINE

## 2017-10-12 PROCEDURE — 77030013715 HC INFL SYS MRTM -B

## 2017-10-12 PROCEDURE — C1874 STENT, COATED/COV W/DEL SYS: HCPCS

## 2017-10-12 PROCEDURE — C1760 CLOSURE DEV, VASC: HCPCS

## 2017-10-12 PROCEDURE — 74011250637 HC RX REV CODE- 250/637: Performed by: INTERNAL MEDICINE

## 2017-10-12 PROCEDURE — 027034Z DILATION OF CORONARY ARTERY, ONE ARTERY WITH DRUG-ELUTING INTRALUMINAL DEVICE, PERCUTANEOUS APPROACH: ICD-10-PCS | Performed by: INTERNAL MEDICINE

## 2017-10-12 PROCEDURE — C1769 GUIDE WIRE: HCPCS

## 2017-10-12 PROCEDURE — C1894 INTRO/SHEATH, NON-LASER: HCPCS

## 2017-10-12 PROCEDURE — 77030004533 HC CATH ANGI DX IMP BSC -B

## 2017-10-12 PROCEDURE — 74011250637 HC RX REV CODE- 250/637

## 2017-10-12 PROCEDURE — C1887 CATHETER, GUIDING: HCPCS

## 2017-10-12 PROCEDURE — C1725 CATH, TRANSLUMIN NON-LASER: HCPCS

## 2017-10-12 PROCEDURE — 93454 CORONARY ARTERY ANGIO S&I: CPT

## 2017-10-12 PROCEDURE — 77030013744

## 2017-10-12 PROCEDURE — 93005 ELECTROCARDIOGRAM TRACING: CPT

## 2017-10-12 PROCEDURE — 74011000258 HC RX REV CODE- 258: Performed by: INTERNAL MEDICINE

## 2017-10-12 PROCEDURE — 74011636320 HC RX REV CODE- 636/320: Performed by: INTERNAL MEDICINE

## 2017-10-12 RX ORDER — METOPROLOL TARTRATE 25 MG/1
25 TABLET, FILM COATED ORAL EVERY 12 HOURS
Qty: 60 TAB | Refills: 6 | Status: SHIPPED | OUTPATIENT
Start: 2017-10-12

## 2017-10-12 RX ORDER — ATROPINE SULFATE 0.1 MG/ML
1 INJECTION INTRAVENOUS AS NEEDED
Status: DISCONTINUED | OUTPATIENT
Start: 2017-10-12 | End: 2017-10-12 | Stop reason: ALTCHOICE

## 2017-10-12 RX ORDER — MIDAZOLAM HYDROCHLORIDE 1 MG/ML
1-10 INJECTION, SOLUTION INTRAMUSCULAR; INTRAVENOUS
Status: DISCONTINUED | OUTPATIENT
Start: 2017-10-12 | End: 2017-10-12 | Stop reason: ALTCHOICE

## 2017-10-12 RX ORDER — SODIUM CHLORIDE 9 MG/ML
3 INJECTION, SOLUTION INTRAVENOUS CONTINUOUS
Status: DISPENSED | OUTPATIENT
Start: 2017-10-12 | End: 2017-10-12

## 2017-10-12 RX ORDER — ROSUVASTATIN CALCIUM 10 MG/1
10 TABLET, COATED ORAL
Qty: 30 TAB | Refills: 6 | Status: SHIPPED | OUTPATIENT
Start: 2017-10-12 | End: 2018-05-21 | Stop reason: DRUGHIGH

## 2017-10-12 RX ORDER — GUAIFENESIN 100 MG/5ML
81 LIQUID (ML) ORAL DAILY
Qty: 30 TAB | Refills: 6 | Status: SHIPPED | OUTPATIENT
Start: 2017-10-12

## 2017-10-12 RX ORDER — CLOPIDOGREL 300 MG/1
600 TABLET, FILM COATED ORAL
Status: DISCONTINUED | OUTPATIENT
Start: 2017-10-12 | End: 2017-10-12 | Stop reason: ALTCHOICE

## 2017-10-12 RX ORDER — LIDOCAINE HYDROCHLORIDE 10 MG/ML
4-30 INJECTION INFILTRATION; PERINEURAL
Status: DISCONTINUED | OUTPATIENT
Start: 2017-10-12 | End: 2017-10-12 | Stop reason: ALTCHOICE

## 2017-10-12 RX ORDER — HEPARIN SODIUM 1000 [USP'U]/ML
INJECTION, SOLUTION INTRAVENOUS; SUBCUTANEOUS
Status: DISCONTINUED
Start: 2017-10-12 | End: 2017-10-12 | Stop reason: HOSPADM

## 2017-10-12 RX ORDER — HEPARIN SODIUM 200 [USP'U]/100ML
1000 INJECTION, SOLUTION INTRAVENOUS
Status: DISCONTINUED | OUTPATIENT
Start: 2017-10-12 | End: 2017-10-12 | Stop reason: ALTCHOICE

## 2017-10-12 RX ORDER — SODIUM CHLORIDE 0.9 % (FLUSH) 0.9 %
10 SYRINGE (ML) INJECTION AS NEEDED
Status: DISCONTINUED | OUTPATIENT
Start: 2017-10-12 | End: 2017-10-12 | Stop reason: ALTCHOICE

## 2017-10-12 RX ORDER — FENTANYL CITRATE 50 UG/ML
25-200 INJECTION, SOLUTION INTRAMUSCULAR; INTRAVENOUS
Status: DISCONTINUED | OUTPATIENT
Start: 2017-10-12 | End: 2017-10-12 | Stop reason: ALTCHOICE

## 2017-10-12 RX ORDER — SODIUM CHLORIDE 9 MG/ML
1.5 INJECTION, SOLUTION INTRAVENOUS CONTINUOUS
Status: DISPENSED | OUTPATIENT
Start: 2017-10-12 | End: 2017-10-12

## 2017-10-12 RX ORDER — HEPARIN SODIUM 1000 [USP'U]/ML
5000 INJECTION, SOLUTION INTRAVENOUS; SUBCUTANEOUS
Status: DISCONTINUED | OUTPATIENT
Start: 2017-10-12 | End: 2017-10-12 | Stop reason: ALTCHOICE

## 2017-10-12 RX ADMIN — METOPROLOL TARTRATE 25 MG: 25 TABLET ORAL at 10:09

## 2017-10-12 RX ADMIN — Medication 10 ML: at 07:18

## 2017-10-12 RX ADMIN — HEPARIN SODIUM 2000 UNITS: 200 INJECTION, SOLUTION INTRAVENOUS at 07:20

## 2017-10-12 RX ADMIN — BIVALIRUDIN 145.07 MG/HR: 250 INJECTION, POWDER, LYOPHILIZED, FOR SOLUTION INTRAVENOUS at 07:34

## 2017-10-12 RX ADMIN — SODIUM CHLORIDE 3 ML/KG/HR: 900 INJECTION, SOLUTION INTRAVENOUS at 06:41

## 2017-10-12 RX ADMIN — Medication 10 ML: at 06:42

## 2017-10-12 RX ADMIN — IOPAMIDOL 120 ML: 755 INJECTION, SOLUTION INTRAVENOUS at 07:51

## 2017-10-12 RX ADMIN — FENTANYL CITRATE 25 MCG: 50 INJECTION, SOLUTION INTRAMUSCULAR; INTRAVENOUS at 07:24

## 2017-10-12 RX ADMIN — TICAGRELOR 90 MG: 90 TABLET ORAL at 06:42

## 2017-10-12 RX ADMIN — IOPAMIDOL 120 ML: 755 INJECTION, SOLUTION INTRAVENOUS at 07:58

## 2017-10-12 RX ADMIN — FENTANYL CITRATE 25 MCG: 50 INJECTION, SOLUTION INTRAMUSCULAR; INTRAVENOUS at 07:57

## 2017-10-12 RX ADMIN — ASPIRIN 81 MG 81 MG: 81 TABLET ORAL at 10:10

## 2017-10-12 RX ADMIN — LIDOCAINE HYDROCHLORIDE 10 ML: 10 INJECTION, SOLUTION INFILTRATION; PERINEURAL at 07:20

## 2017-10-12 RX ADMIN — MIDAZOLAM HYDROCHLORIDE 2 MG: 1 INJECTION, SOLUTION INTRAMUSCULAR; INTRAVENOUS at 07:58

## 2017-10-12 RX ADMIN — IOPAMIDOL 50 ML: 755 INJECTION, SOLUTION INTRAVENOUS at 07:34

## 2017-10-12 RX ADMIN — FENTANYL CITRATE 50 MCG: 50 INJECTION, SOLUTION INTRAMUSCULAR; INTRAVENOUS at 07:20

## 2017-10-12 RX ADMIN — SODIUM CHLORIDE 1.5 ML/KG/HR: 900 INJECTION, SOLUTION INTRAVENOUS at 07:51

## 2017-10-12 RX ADMIN — MIDAZOLAM HYDROCHLORIDE 2 MG: 1 INJECTION, SOLUTION INTRAMUSCULAR; INTRAVENOUS at 07:19

## 2017-10-12 RX ADMIN — TICAGRELOR 90 MG: 90 TABLET ORAL at 07:59

## 2017-10-12 RX ADMIN — MIDAZOLAM HYDROCHLORIDE 1 MG: 1 INJECTION, SOLUTION INTRAMUSCULAR; INTRAVENOUS at 07:24

## 2017-10-12 NOTE — DISCHARGE SUMMARY
Cardiology Discharge Summary     Patient ID:  Ivan Adams  [id number]  13 y.o.  1965    Admit Date: 10/10/2017    Discharge Date: 10/12/2017     Admitting Physician: Gina Leblanc MD     Discharge Physician: Gina Leblanc MD    Admission Diagnoses: Chest pain  ST elevation (STEMI) myocardial infarction involving right coronary artery [facility])    Discharge Diagnoses: Active Problems:    Chest pain (10/10/2017)      ST elevation (STEMI) myocardial infarction involving right coronary artery ([address] (10/10/2017)    s/p stent of RCA(infarct vessel)  S/p stent of LAD  CAD  EF preserved at 55%  Hyperlipidemia  HTN    Discharge Condition: Stable    Cardiology Procedures this Admission:  Left heart catheterization with PCI    Hospital Course: He presented with STEMI. He received a large stent in RCA. At the time of cath, LAD also had a mid tight lesion at the bifurcation of D1 which has separate lesion as well. Thus I brought pt down in two days and re-evaluated the LAD system. It has 80% lesion at the bifurcation of D1 which is stented with excellent result. D1 did not have to be fixed as it looks stable. EF was preserved at 55% as his DTB time was short less than 35 minutes. He tolerated these procedures and there was no complication from this STEMI with peak troponin of only 6. His groin was fine with no complication. Consults: None    Discharge Exam:     Visit Vitals    /71 (BP 1 Location: Left arm, BP Patient Position: At rest)    Pulse 62    Temp 98.3 °F (36.8 °C)    Resp 18    Ht 5' 5\" (1.651 m)    Wt 82.9 kg (182 lb 12.2 oz)    SpO2 97%    BMI 30.41 kg/m2     General Appearance:  Well developed, well nourished, in no acute distress. Ears/Nose/Mouth/Throat:   Hearing grossly normal.         Neck: Supple. Chest:   Lungs clear to auscultation bilaterally. Normal resp effort. Cardiovascular:  Regular rate and rhythm, S1, S2 normal, no new murmur. Abdomen:   Soft, non-tender, bowel sounds are present. Extremities: No edema bilaterally. Neuro:  Skin: A/O x 3, grossly nonfocal. Normal mood/affect. Warm and dry. Disposition: home    Patient Instructions:   Current Discharge Medication List      START taking these medications    Details   aspirin 81 mg chewable tablet Take 1 Tab by mouth daily. Qty: 30 Tab, Refills: 6      ticagrelor (BRILINTA) 90 mg tablet Take 1 Tab by mouth every twelve (12) hours every twelve (12) hours. Qty: 60 Tab, Refills: 6      metoprolol tartrate (LOPRESSOR) 25 mg tablet Take 1 Tab by mouth every twelve (12) hours. Qty: 60 Tab, Refills: 6                             CONTINUE these medications which have NOT CHANGED    Details   rosuvastatin (CRESTOR) 10 mg tablet Take 1 Tab by mouth nightly. Qty: 30 Tab, Refills: 3      traZODone (DESYREL) 50 mg tablet Take 25 mg by mouth nightly. sertraline (ZOLOFT) 100 mg tablet Take 200 mg by mouth daily. STOP taking these medications       gabapentin (NEURONTIN) 600 mg tablet Comments:   Reason for Stopping:         cyclobenzaprine (FLEXERIL) 10 mg tablet Comments:   Reason for Stopping:         ARIPiprazole (ABILIFY) 2 mg tablet Comments:   Reason for Stopping:         lovastatin (MEVACOR) 40 mg tablet Comments:   Reason for Stopping:         prazosin (MINIPRESS) 2 mg capsule Comments:   Reason for Stopping:         dextromethorphan-quiNIDine (NUEDEXTA) 20-10 mg per capsule Comments:   Reason for Stopping:         butalbital-acetaminophen-caffeine (FIORICET) -40 mg per tablet Comments:   Reason for Stopping:               Referenced discharge instructions provided by nursing for diet and activity.     Follow-up with Dr. Constance Sousa in two weeks     Signed:  Lucho Frey MD  10/12/2017  8:22 AM

## 2017-10-12 NOTE — PROGRESS NOTES
Cardiac Cath Lab Procedure Area Arrival Note:    Stephanie Hendrickson arrived to Cardiac Cath Lab, Procedure Area. Patient identifiers verified with NAME and DATE OF BIRTH. Procedure verified with patient. Consent forms verified. Allergies verified. Patient informed of procedure and plan of care. Questions answered with review. Patient voiced understanding of procedure and plan of care. Patient on cardiac monitor, non-invasive blood pressure, SPO2 monitor. On O2 @ 2 lpm via NC.  IV of NS on pump at 248 ml/hr. Patient status doing well without problems. Patient is A&Ox 4. Patient reports no pain. Patient medicated during procedure with orders obtained and verified by Dr. Chris Dove. Refer to patients Cardiac Cath Lab PROCEDURE REPORT for vital signs, assessment, status, and response during procedure, printed at end of case. Printed report on chart or scanned into chart. TRANSFER - OUT REPORT:    Verbal report given to SUZANNA Elliott on Stephanie Hendrickson being transferred to cath lab recovery for routine progression of care       Report consisted of patients Situation, Background, Assessment and   Recommendations(SBAR). Information from the following report(s) Procedure Summary was reviewed with the receiving nurse. Opportunity for questions and clarification was provided.

## 2017-10-12 NOTE — PROGRESS NOTES
TRANSFER - IN REPORT:    Verbal report received from SAMUEL Mckeon RN on Jeniffer Matos, Procedure Cath procedure with stent to LAD , from the Cardiac Cath lab, for routine progression of care. Report consisted of patients Situation, Background, Assessment and Recommendations(SBAR). Information from the following report(s) Procedure Summary, MAR and Recent Results was reviewed with the receiving clinician. Opportunity for questions and clarification was provided. Assessment completed upon patients arrival to [address] [address] and care assumed. Cardiac Cath Lab Recovery Arrival Note:     Jeniffer Matos arrived to [facility] recovery area. Patient procedure= Cath procedure with stent to LAD. Patient on cardiac monitor, non-invasive blood pressure, Patient status doing well without problems. Patient is arousable. Patient reports no pain, no chest pain, no n/v. Procedure site without any bleeding and no hematoma.

## 2017-10-12 NOTE — DISCHARGE INSTRUCTIONS
[email or web address]    Patient Discharge Instructions    Chacho Hernandez / [id number] : 1965    Admitted 10/10/2017 Discharged: 10/12/2017       · It is important that you take the medication exactly as they are prescribed. · Keep your medication in the bottles provided by the pharmacist and keep a list of the medication names, dosages, and times to be taken in your wallet. · Do not take other medications without consulting your doctor. BRING ALL OF YOUR MEDICINES TO YOUR OFFICE VISIT with Dr. Jam Leal with David Pichardo MD in 2 week      Cardiac Catheterization  Discharge Instructions     Do not drive, operate any machinery, or sign any legal documents for 24 hours after your procedure. You must have someone to drive you home.  You may take a shower 24 hours after your cardiac catheterization. Be sure to get the dressing wet and then remove it; gently wash the area with warm soapy water. Pat dry and leave open to air. To help prevent infections, be sure to keep the cath site clean and dry. No lotions, creams, powders, ointments, etc. in the cath site for approximately 1 week.  Do not take a tub bath, get in a hot tub or swimming pool for approximately 5 days or until the cath site is completely healed.  No strenuous activity or heavy lifting over 10 lbs. for 7 days.  Drink plenty of fluids for 24-48 hours after your cath to flush the contrast dye from your kidneys. No alcoholic beverages for 24 hours. You may resume your previous diet (low fat, low cholesterol) after your cath.  After your cath, some bruising or discomfort is common during the healing process. Tylenol, 1-2 tablets every 6 hours as needed, is recommended if you experience any discomfort.   If you experience any signs or symptoms of infection such as fever, chills, or poorly healing incision, persistent tenderness or swelling in the groin, redness and/or warmth to the touch, numbness, significant tingling or pain at the groin site or affected extremity, rash, drainage from the cath site, or if the leg feels tight or swollen, call your physician right away.  If bleeding at the cath site occurs, take a clean gauze pad and apply direct pressure to the groin just above the puncture site. Call 911 immediately, and continue to apply direct pressure until an ambulance gets to your location.  You may return to work  2  days after your cardiac cath if no groin bleeding. Information obtained by :  I understand that if any problems occur once I am at home I am to contact my physician. I understand and acknowledge receipt of the instructions indicated above.                                                                                                                                            R.N.'s Signature                                                                  Date/Time                                                                                                                                              Patient or Representative Signature                                                          Date/Time      Jose Frgaa MD

## 2017-10-12 NOTE — PROGRESS NOTES
Patient with a PMH for anxiety, poet traumatic headache, ADD hypercholesteremia was admitted for NSTEMI. Care manager met with patient and his wife  Darwin Ibarra [phone] to explain role and discuss transitions of care. Patient is independent . Confirmed his PCP to be Dr Yen Neil and sees him every 2 months and gets his medications filled at [facility]. Offered patient a [facility] for MI and he is agreeable, referrl made through [facility] and accepted. Jose Carballo RN,CRM  Care Management Interventions  PCP Verified by CM:  Yes  MyChart Signup: No  Discharge Durable Medical Equipment: No  Physical Therapy Consult: No  Occupational Therapy Consult: No  Speech Therapy Consult: No  Current Support Network:  (wife ,Darwin Ibarra [phone]  )  Confirm Follow Up Transport: Family (wife will transport)  Discharge Location  Discharge Placement: Home

## 2017-10-12 NOTE — CARDIO/PULMONARY
Cardiac Wellness: MI education folder with catheterization brochure to bedside of Natanjim Chaves. Pt wife states she does not have the one that was given to her yesterday. Also gave medication handouts on brilinta (and coupon card), crestor and metoprolol. Educated using teach back method. Reviewed MI diagnosis definition and purpose of intervention. Identified pertinent CAD risk factors including hypertension, hypercholesteremia, family history and encouraged lifestyle modifications. Reviewed importance of medication compliance and follow up appointments with cardiologist. He will see Dr. Kayleigh Ibanez. Discussed purpose of above listed medications and potential side effects and what to report to physician after discharge. Smoking history assessed. Patient is a non smoker. Emphasized value of cardiac rehab, discussed Cardiac Wellness Program and encouraged enrollment. Will follow up with patient by phone after discharge for participation. Natan Chaves and wife verbalized understanding with questions answered.

## 2017-10-12 NOTE — PROGRESS NOTES
0000: [facility] resting in bed at this time. Problem: Falls - Risk of  Goal: *Absence of Falls  Document Vanessa Fall Risk and appropriate interventions in the flowsheet. Outcome: Progressing Towards Goal  Fall Risk Interventions:  Mobility Interventions: Patient to call before getting OOB           Medication Interventions: Teach patient to arise slowly, Patient to call before getting OOB     Elimination Interventions: Call light in reach, Patient to call for help with toileting needs     History of Falls Interventions: Door open when patient unattended     Problem: Pressure Injury - Risk of  Goal: *Prevention of pressure ulcer  Outcome: Progressing Towards Goal  Patient reposition self while in bed.    0650: TRANSFER - OUT REPORT:    Verbal report given to Silverio Gonsales RN(name) on Chandrika Santos  being transferred to Cath lab(unit) for routine progression of care       Report consisted of patients Situation, Background, Assessment and   Recommendations(SBAR). Information from the following report(s) SBAR, Kardex, Intake/Output, MAR, Recent Results, Med Rec Status and Cardiac Rhythm NSR was reviewed with the receiving nurse.     Lines:   Peripheral IV 10/10/17 Right Antecubital (Active)   Site Assessment Clean, dry, & intact 10/12/2017 12:14 AM   Phlebitis Assessment 0 10/12/2017 12:14 AM   Infiltration Assessment 0 10/12/2017 12:14 AM   Dressing Status Clean, dry, & intact 10/12/2017 12:14 AM   Dressing Type Transparent;Tape 10/12/2017 12:14 AM   Hub Color/Line Status Green;Capped 10/12/2017 12:14 AM   Action Taken Open ports on tubing capped 10/11/2017 11:40 AM   Alcohol Cap Used Yes 10/12/2017 12:14 AM       Peripheral IV Left Antecubital (Active)   Site Assessment Clean, dry, & intact 10/12/2017 12:14 AM   Phlebitis Assessment 0 10/12/2017 12:14 AM   Infiltration Assessment 0 10/12/2017 12:14 AM   Dressing Status Clean, dry, & intact 10/12/2017 12:14 AM   Dressing Type Transparent;Tape 10/12/2017 12:14 AM   Hub Color/Line Status Pink 10/12/2017 12:14 AM        Opportunity for questions and clarification was provided.       Patient transported with:   Monitor  Registered Nurse

## 2017-10-12 NOTE — PROGRESS NOTES
0700: pt off unit to cath lab  0730: Bedside shift change report given to Los Seth RN (oncoming nurse) by SUZANNA Roblero (offgoing nurse). Report included the following information SBAR, Kardex, Intake/Output, MAR, Accordion, Recent Results, Med Rec Status and Cardiac Rhythm NSR.   0920: TRANSFER - IN REPORT:  Verbal report received from [facility], RN(name) on [employer]  being received from Cath Lab(unit) for routine progression of care    Report consisted of patients Situation, Background, Assessment and   Recommendations(SBAR). Information from the following report(s) SBAR, Kardex, Procedure Summary, Intake/Output, MAR, Accordion, Recent Results, Med Rec Status and Cardiac Rhythm NSR was reviewed with the receiving nurse  Opportunity for questions and clarification was provided. Assessment completed upon patients arrival to unit and care assumed. 4796: Patient arrived on unit. Tele-monitor placed and confirmed with monitor tech. 1224: IV(s) and tele-monitor removed. Discharge instructions reviewed with patient with time allowed for questions. Problem: Falls - Risk of  Goal: *Absence of Falls  Document Vanessa Fall Risk and appropriate interventions in the flowsheet.    Outcome: Progressing Towards Goal  Fall Risk Interventions:  Mobility Interventions: Communicate number of staff needed for ambulation/transfer, Patient to call before getting OOB, PT Consult for mobility concerns, PT Consult for assist device competence, Strengthening exercises (ROM-active/passive)           Medication Interventions: Evaluate medications/consider consulting pharmacy, Patient to call before getting OOB, Teach patient to arise slowly     Elimination Interventions: Urinal in reach, Call light in reach     History of Falls Interventions: Door open when patient unattended           Problem: Pressure Injury - Risk of  Goal: *Prevention of pressure ulcer  Outcome: Progressing Towards Goal    10/12/17 0958   Wound Prevention and Protection Methods   Orientation of Wound Prevention Posterior   Location of Wound Prevention Sacrum/Coccyx   Dressing Present  No   Read Only, Retired: Wound Treatment (non-mechanical)   Wound Offloading (Prevention Methods) Bed, pressure redistribution/air;Bed, pressure reduction mattress;Pillows;Repositioning;Turning         Problem: Cath Lab Procedures: Post-Cath Day 1  Goal: Activity/Safety  Outcome: Progressing Towards Goal  On bedrest until 1100  Goal: Nutrition/Diet  Outcome: Progressing Towards Goal  On cardiac diet             Goal: Discharge Planning  Outcome: Progressing Towards Goal  Will discharge this afternoon    Problem: AMI: Day 2  Goal: Diagnostic Test/Procedures  Outcome: Progressing Towards Goal  EKG and labs completed this AM.  Goal: Treatments/Interventions/Procedures  Outcome: Progressing Towards Goal  S/p cardiac cath

## 2017-10-12 NOTE — PROCEDURES
Cardiac Catheterization Procedure Note   Patient: Sarah Dutton  MRN: [id number]  SSN: [id number]   YOB: 1965 Age: 46 y.o. Sex: male    Date of Procedure: 10/12/2017   Pre-procedure Diagnosis: Unstable Angina  Post-procedure Diagnosis: Coronary Artery Disease  Procedure:  PCI  :  Dr. Jose Fraga MD    Assistant(s):  None  Anesthesia: Moderate Sedation   Estimated Blood Loss: Less than 10 mL   Specimens Removed: None  Findings: RCA with widely patent stent placed two days ago with STEMI; LAD with mid 85% lesion just beyond the bifurcating D1. The D1 has proximal 75% lesion;  The LAD lesion was stented across D1 with 3.0x26 Resolute HERON with excellent result  Complications: None   Implants:  stent  Signed by:  Jose Fraga MD  10/12/2017  8:04 AM

## 2017-10-13 ENCOUNTER — TELEPHONE (OUTPATIENT)
Dept: CARDIAC REHAB | Age: 52
End: 2017-10-13

## 2017-10-13 ENCOUNTER — HOME CARE VISIT (OUTPATIENT)
Dept: HOME HEALTH SERVICES | Facility: HOME HEALTH | Age: 52
End: 2017-10-13

## 2017-10-13 NOTE — TELEPHONE ENCOUNTER
10/13/2017 Cardiac Wellness: Called Mr. Juan Keen  to discuss participation in the Cardiac Wellness Program following STEMI and stent on 10/10/17. Left voicemail message. Will follow up week of 10/16/17.   Katie Velasquez RN

## 2017-10-16 ENCOUNTER — TELEPHONE (OUTPATIENT)
Dept: CARDIAC REHAB | Age: 52
End: 2017-10-16

## 2017-10-16 ENCOUNTER — HOME CARE VISIT (OUTPATIENT)
Dept: HOME HEALTH SERVICES | Facility: HOME HEALTH | Age: 52
End: 2017-10-16

## 2017-10-16 NOTE — TELEPHONE ENCOUNTER
10/16/2017 Cardiac Wellness: Called Mr. Noah Anthony  to discuss participation in the Cardiac Wellness Program following a STEMI and stent on 10/10/17. Spoke with patient's wife who wanted to make an intake appointment. Patient's wife requested that we check insurance benefits and call back.  Morteza Manrique RN

## 2017-10-17 ENCOUNTER — TELEPHONE (OUTPATIENT)
Dept: CARDIAC REHAB | Age: 52
End: 2017-10-17

## 2017-10-17 NOTE — TELEPHONE ENCOUNTER
10/17/2017 Cardiac Wellness: Called Mr. Vijaya Bain about insurance information. I have no copies of insurance cards in his chart, so his wife provided me with a member number for [employer] that does not exist. I left a voicemail message for her to call the numbers on the back of the insurance cards he has to see if they are still active cards. She also told me that she was applying for medicaid or medicare, she could not remember which one for him. Mr. Yun Hammond registration says he has workman's comp and self pay. I have no benefit information I can report to his wife or the patient at this time.  Odalys Chow

## 2017-10-18 ENCOUNTER — HOME CARE VISIT (OUTPATIENT)
Dept: HOME HEALTH SERVICES | Facility: HOME HEALTH | Age: 52
End: 2017-10-18

## 2017-10-19 ENCOUNTER — OFFICE VISIT (OUTPATIENT)
Dept: INTERNAL MEDICINE CLINIC | Age: 52
End: 2017-10-19

## 2017-10-19 ENCOUNTER — HOME CARE VISIT (OUTPATIENT)
Dept: HOME HEALTH SERVICES | Facility: HOME HEALTH | Age: 52
End: 2017-10-19

## 2017-10-19 VITALS
TEMPERATURE: 98 F | HEART RATE: 68 BPM | RESPIRATION RATE: 18 BRPM | WEIGHT: 179 LBS | SYSTOLIC BLOOD PRESSURE: 126 MMHG | OXYGEN SATURATION: 100 % | BODY MASS INDEX: 29.82 KG/M2 | HEIGHT: 65 IN | DIASTOLIC BLOOD PRESSURE: 85 MMHG

## 2017-10-19 DIAGNOSIS — I21.11 ST ELEVATION (STEMI) MYOCARDIAL INFARCTION INVOLVING RIGHT CORONARY ARTERY (HCC): Primary | ICD-10-CM

## 2017-10-19 DIAGNOSIS — Z12.11 COLON CANCER SCREENING: ICD-10-CM

## 2017-10-19 RX ORDER — LOVASTATIN 40 MG/1
40 TABLET ORAL EVERY 24 HOURS
Refills: 6 | COMMUNITY
Start: 2017-10-12 | End: 2017-11-02

## 2017-10-19 NOTE — MR AVS SNAPSHOT
Visit Information Date & Time Provider Department Dept. Phone Encounter #  
 10/19/2017  9:30 AM Pepito Summers MD Michelle [facility] and [place] [address] [id number] Follow-up Instructions Return in about 2 months (around 12/19/2017) for MI. Follow-up and Disposition History Your Appointments 2/16/2018 10:00 AM  
Any with Pepito Summers MD  
[address] [place] [address] and Primary Care [facility]) Appt Note: follow up 6mnths cholesterol [address] [place]  
  
   
 [phone] [id number] Upcoming Health Maintenance Date Due FOBT Q 1 YEAR AGE 50-75 3/3/2017 DTaP/Tdap/Td series (2 - Td) 3/3/2026 Allergies as of 10/19/2017  Review Complete On: 10/19/2017 By: Pepito Summers MD  
 No Known Allergies Current Immunizations  Reviewed on 3/3/2016 Name Date Tdap 3/3/2016 Not reviewed this visit You Were Diagnosed With   
  
 Codes Comments ST elevation (STEMI) myocardial infarction involving right coronary artery ([facility] [address]    -  Primary ICD-10-CM: I21.11 ICD-9-CM: 410.31 Colon cancer screening     ICD-10-CM: Z12.11 ICD-9-CM: V76.51 Vitals BP Pulse Temp Resp Height(growth percentile) Weight(growth percentile) 126/85 (BP 1 Location: Right arm, BP Patient Position: Sitting) 68 98 °F (36.7 °C) (Oral) 18 5' 5\" (1.651 m) 179 lb (81.2 kg) SpO2 BMI Smoking Status 100% 29.79 kg/m2 Never Smoker BMI and BSA Data Body Mass Index Body Surface Area  
 29.79 kg/m 2 1.93 m 2 Preferred Pharmacy Pharmacy Name Phone [facility] [place], [address] RUDD Your Updated Medication List  
  
   
This list is accurate as of: 10/19/17 10:19 AM.  Always use your most recent med list.  
  
  
  
  
 aspirin 81 mg chewable tablet Take 1 Tab by mouth daily. lovastatin 40 mg tablet Commonly known as:  MEVACOR Take 40 mg by mouth every twenty-four (24) hours. metoprolol tartrate 25 mg tablet Commonly known as:  LOPRESSOR Take 1 Tab by mouth every twelve (12) hours. * rosuvastatin 10 mg tablet Commonly known as:  CRESTOR Take 1 Tab by mouth nightly. * rosuvastatin 10 mg tablet Commonly known as:  CRESTOR Take 1 Tab by mouth nightly. ticagrelor 90 mg tablet Commonly known as:  [employer] Take 1 Tab by mouth every twelve (12) hours every twelve (12) hours. traZODone 50 mg tablet Commonly known as:  Anibal Oh Take 25 mg by mouth nightly. ZOLOFT 100 mg tablet Generic drug:  sertraline Take 200 mg by mouth daily. * Notice: This list has 2 medication(s) that are the same as other medications prescribed for you. Read the directions carefully, and ask your doctor or other care provider to review them with you. We Performed the Following OCCULT BLOOD, IMMUNOASSAY (FIT) [id number] CPT(R)] REFERRAL TO CARDIAC REHAB [PTO872 Custom] Follow-up Instructions Return in about 2 months (around 12/19/2017) for MI. To-Do List   
 11/02/2017 9:00 AM  
  Appointment with Mary Jo Varghese RN at [address] [place] [address] ([phone]) 11/02/2017 10:00 AM  
  Appointment with Tomas Cespedes at [address] [place] [address] ([phone]) Referral Information Referral ID Referred By Referred To  
  
 [id number] [facility] Not Available Visits Status Start Date End Date 1 New Request 10/19/17 10/19/18 If your referral has a status of pending review or denied, additional information will be sent to support the outcome of this decision. Introducing [facility] & HEALTH SERVICES! Dear Ben Monsivais: 
Thank you for requesting a [employer] account. Our records indicate that you already have an active [employer] account. You can access your account anytime at [email or web address] Did you know that you can access your hospital and ER discharge instructions at any time in [employer] You can also review all of your test results from your hospital stay or ER visit. Additional Information If you have questions, please visit the Frequently Asked Questions section of the [employer] website at [email or web address] Remember, [employer] is NOT to be used for urgent needs. For medical emergencies, dial 911. Now available from your iPhone and Android! Please provide this summary of care documentation to your next provider. Your primary care clinician is listed as Chaim Rivera. If you have any questions after today's visit, please call [phone].

## 2017-10-19 NOTE — PROGRESS NOTES
Chief Complaint   Patient presents with   [facility] Follow Up     ER F/U     he is a 46y.o. year old male who presents for follow-up of ER visit from hospital. Patient went to [facility] for a heart attack. Reviewed and agree with Nurse Note and duplicated in this note. Reviewed PmHx, RxHx, FmHx, SocHx, AllgHx and updated and dated in the chart.     Family History   Problem Relation Age of Onset    Heart Disease Mother     Stroke Mother        Past Medical History:   Diagnosis Date    Anxiety disorder     Depression     Diarrhea     Falls     Headache     Joint pain     Joint pain     Memory disorder     Memory loss     Muscle pain     Muscle pain     Muscle weakness     Muscle weakness     Torn rotator cuff     right side    Vertigo     Visual disturbance     Visual disturbance       Social History     Social History    Marital status:      Spouse name: N/A    Number of children: N/A    Years of education: N/A     Social History Main Topics    Smoking status: Never Smoker    Smokeless tobacco: Never Used    Alcohol use No    Drug use: Not on file    Sexual activity: Not on file     Other Topics Concern    Not on file     Social History Narrative        Review of Systems - negative except as listed above      Objective:     Vitals:    10/19/17 0941   BP: 126/85   Pulse: 68   Resp: 18   Temp: 98 °F (36.7 °C)   TempSrc: Oral   SpO2: 100%   Weight: 179 lb (81.2 kg)   Height: 5' 5\" (1.651 m)       Physical Examination: General appearance - alert, well appearing, and in no distress  Eyes - pupils equal and reactive, extraocular eye movements intact  Ears - bilateral TM's and external ear canals normal  Nose - normal and patent, no erythema, discharge or polyps  Mouth - mucous membranes moist, pharynx normal without lesions  Neck - supple, no significant adenopathy  Chest - clear to auscultation, no wheezes, rales or rhonchi, symmetric air entry  Heart - normal rate, regular rhythm, normal S1, S2, no murmurs, rubs, clicks or gallops  Back exam - full range of motion, no tenderness, palpable spasm or pain on motion  Neurological - alert, oriented, normal speech, no focal findings or movement disorder noted  Musculoskeletal - no joint tenderness, deformity or swelling  Extremities - peripheral pulses normal, no pedal edema, no clubbing or cyanosis  Skin - normal coloration and turgor, no rashes, no suspicious skin lesions noted    Assessment/ Plan:   Diagnoses and all orders for this visit:    1. ST elevation (STEMI) myocardial infarction involving right coronary artery ([facility] [address]  -     REFERRAL TO CARDIAC REHAB    2. Colon cancer screening  -     OCCULT BLOOD, IMMUNOASSAY (FIT)       Follow-up Disposition:  Return if symptoms worsen or fail to improve. Discussed the patient's I have reviewed/discussed the above normal BMI with the patient and spouse. I have recommended the following interventions: dietary management education, guidance, and counseling, encourage exercise, monitor weight and prescribed dietary intake . The BMI follow up plan is as follows: I have counseled this patient on diet and exercise regimens    1) Remember to stay active and/or exercise regularly (I suggest 30-45 minutes daily)   2) For reliable dietary information, go to www. EATRIGHT.org. You may wish to consider seeing the nutritionist at [facility] [phone], also consider the [address] [facility]. 3) I routinely suggest a complete physical exam once each year (your birth month)  I have discussed the diagnosis with the patient and the intended plan as seen in the above orders. The patient has received an after-visit summary and questions were answered concerning future plans.      Medication Side Effects and Warnings were discussed with patient: yes  Patient Labs were reviewed and or requested: yes  Patient Past Records were reviewed and or requested  yes  I have discussed the diagnosis with the patient and the intended plan as seen in the above orders. Pt agrees to call or return to clinic and/or go to closest ER with any worsening of symptoms. This may include, but not limited to increased fever (>100.4) with NSAIDS or Tylenol, increased edema, confusion, rash, worsening of presenting symptoms.

## 2017-11-01 ENCOUNTER — TELEPHONE (OUTPATIENT)
Dept: CARDIAC REHAB | Age: 52
End: 2017-11-01

## 2017-11-01 NOTE — TELEPHONE ENCOUNTER
11/1/2017 Cardiac Wellness: Called Mr. Raina Sherwood to remind of intake appointment on Thursday, November 2, 2017. Confirmed appointment with patient's wife. Provided pt with contact information for CWP. Also, reminded pt to bring a list of current medications, a personal schedule, and to wear comfortable clothes and shoes.  Pieter Guerrero

## 2017-11-02 ENCOUNTER — HOSPITAL ENCOUNTER (OUTPATIENT)
Dept: CARDIAC REHAB | Age: 52
Discharge: HOME | End: 2017-11-02
Payer: MEDICAID

## 2017-11-02 VITALS — WEIGHT: 185 LBS | BODY MASS INDEX: 30.79 KG/M2

## 2017-11-02 VITALS — HEIGHT: 65 IN | WEIGHT: 185 LBS | BODY MASS INDEX: 30.82 KG/M2

## 2017-11-02 PROCEDURE — 93798 PHYS/QHP OP CAR RHAB W/ECG: CPT

## 2017-11-02 RX ORDER — ARIPIPRAZOLE 2 MG/1
TABLET ORAL DAILY
COMMUNITY
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
## 2017-11-07 ENCOUNTER — APPOINTMENT (OUTPATIENT)
Dept: CARDIAC REHAB | Age: 52
End: 2017-11-07
Payer: MEDICAID

## 2017-11-09 ENCOUNTER — APPOINTMENT (OUTPATIENT)
Dept: CARDIAC REHAB | Age: 52
End: 2017-11-09
Payer: MEDICAID

## 2017-11-14 ENCOUNTER — APPOINTMENT (OUTPATIENT)
Dept: CARDIAC REHAB | Age: 52
End: 2017-11-14
Payer: MEDICAID

## 2017-11-14 ENCOUNTER — TELEPHONE (OUTPATIENT)
Dept: CARDIAC REHAB | Age: 52
End: 2017-11-14

## 2017-11-14 NOTE — TELEPHONE ENCOUNTER
11/14/2017 Cardiac Wellness: Mr. Zayda Isaacs called to cancel today's and Thursday's appointment d/t his neurology appointment did not go well and the doctor is sending him to see another doctor and does not him to participate in CWP this week. Neurology will follow up with him on Thursday. Will return for next appointment.  Mitzie Ormond

## 2017-11-16 ENCOUNTER — APPOINTMENT (OUTPATIENT)
Dept: CARDIAC REHAB | Age: 52
End: 2017-11-16
Payer: MEDICAID

## 2017-11-21 ENCOUNTER — APPOINTMENT (OUTPATIENT)
Dept: CARDIAC REHAB | Age: 52
End: 2017-11-21
Payer: MEDICAID

## 2017-11-21 ENCOUNTER — TELEPHONE (OUTPATIENT)
Dept: CARDIAC REHAB | Age: 52
End: 2017-11-21

## 2017-11-21 NOTE — TELEPHONE ENCOUNTER
11/21/2017 Cardiac Wellness: Mr. Rozina Pastrana called to cancel today's appointment d/t doctor still wanting him to hold off rehab for a bit. Will return for next appointment.  Olya Arrington

## 2017-11-28 ENCOUNTER — APPOINTMENT (OUTPATIENT)
Dept: CARDIAC REHAB | Age: 52
End: 2017-11-28
Payer: MEDICAID

## 2017-11-29 ENCOUNTER — OFFICE VISIT (OUTPATIENT)
Dept: NEUROLOGY | Age: 52
End: 2017-11-29

## 2017-11-29 VITALS
DIASTOLIC BLOOD PRESSURE: 80 MMHG | RESPIRATION RATE: 14 BRPM | HEIGHT: 65 IN | WEIGHT: 188 LBS | SYSTOLIC BLOOD PRESSURE: 140 MMHG | OXYGEN SATURATION: 98 % | BODY MASS INDEX: 31.32 KG/M2 | HEART RATE: 57 BPM

## 2017-11-29 DIAGNOSIS — G44.321 INTRACTABLE CHRONIC POST-TRAUMATIC HEADACHE: Primary | ICD-10-CM

## 2017-11-29 DIAGNOSIS — R90.89 ABNORMAL BRAIN MRI: ICD-10-CM

## 2017-11-29 RX ORDER — GABAPENTIN 300 MG/1
300 CAPSULE ORAL 3 TIMES DAILY
Qty: 270 CAP | Refills: 1 | Status: SHIPPED | OUTPATIENT
Start: 2017-11-29 | End: 2017-12-18 | Stop reason: SDUPTHER

## 2017-11-29 NOTE — PATIENT INSTRUCTIONS

## 2017-11-29 NOTE — MR AVS SNAPSHOT
Visit Information Date & Time Provider Department Dept. Phone Encounter #  
 11/29/2017  8:15 AM Talya Mari MD [facility] at [address] [place] [address] [id number] Follow-up Instructions Return in about 3 months (around 2/28/2018). Follow-up and Disposition History Your Appointments 12/21/2017  9:30 AM  
Any with Erik Ricketts MD  
[address] [place] [address] and Primary Care [address] [place] [address]) Appt Note: 2 month f/u ST  
 [address] [place] [address] [phone]  
  
   
 [address] [place] [phone]  
  
    
 2/16/2018 10:00 AM  
Any with Erik Ricketts MD  
[address] [place] [address] and Primary Care [address] [place] [address]) Appt Note: follow up 6mnths cholesterol [address] [place] [address] [id number] [phone] Upcoming Health Maintenance Date Due FOBT Q 1 YEAR AGE 50-75 3/3/2017 DTaP/Tdap/Td series (2 - Td) 3/3/2026 Allergies as of 11/29/2017  Review Complete On: 11/29/2017 By: Talya Mari MD  
 No Known Allergies Current Immunizations  Reviewed on 11/2/2017 Name Date Influenza Vaccine 11/1/2012 Tdap 3/3/2016 Not reviewed this visit You Were Diagnosed With   
  
 Codes Comments Intractable chronic post-traumatic headache    -  Primary ICD-10-CM: A62.836 ICD-9-CM: 339.22 Abnormal brain MRI     ICD-10-CM: R90.89 ICD-9-CM: 793.0 Vitals BP Pulse Resp Height(growth percentile) Weight(growth percentile) SpO2  
 140/80 (!) 57 14 5' 5\" (1.651 m) 188 lb (85.3 kg) 98% BMI Smoking Status 31.28 kg/m2 Never Smoker Vitals History BMI and BSA Data Body Mass Index Body Surface Area  
 31.28 kg/m 2 1.98 m 2 Preferred Pharmacy Pharmacy Name Phone [facility] [address] RUDD Your Updated Medication List  
  
   
 This list is accurate as of: 11/29/17  9:27 AM.  Always use your most recent med list.  
  
  
  
  
 ARIPiprazole 2 mg tablet Commonly known as:  ABILIFY Take  by mouth daily. aspirin 81 mg chewable tablet Take 1 Tab by mouth daily. gabapentin 300 mg capsule Commonly known as:  NEURONTIN Take 1 Cap by mouth three (3) times daily. metoprolol tartrate 25 mg tablet Commonly known as:  LOPRESSOR Take 1 Tab by mouth every twelve (12) hours. rosuvastatin 10 mg tablet Commonly known as:  CRESTOR Take 1 Tab by mouth nightly. ticagrelor 90 mg tablet Commonly known as:  [employer] Take 1 Tab by mouth every twelve (12) hours every twelve (12) hours. traZODone 50 mg tablet Commonly known as:  Orma Paddy Take 25 mg by mouth nightly. ZOLOFT 100 mg tablet Generic drug:  sertraline Take 200 mg by mouth daily. Prescriptions Sent to Pharmacy Refills  
 gabapentin (NEURONTIN) 300 mg capsule 1 Sig: Take 1 Cap by mouth three (3) times daily. Class: Normal  
 Pharmacy: [address] [place] [address] #: [phone] Route: Oral  
  
Follow-up Instructions Return in about 3 months (around 2/28/2018). To-Do List   
 11/30/2017 Imaging:  MRI BRAIN W WO CONT   
  
 11/30/2017 8:30 AM  
  Appointment with [address] at [address] [place] [address] ([phone])  
  
 11/30/2017 9:30 AM  
  Appointment with Emily CONTI [facility] at [address] [place] [address] ([phone]) 12/05/2017 9:00 AM  
  Appointment with [address] at [address] [place] [address] ([phone]) 12/05/2017 10:00 AM  
  Appointment with Megan [address] at [address] [place] [address] ([phone]) 12/07/2017 8:30 AM  
  Appointment with [address] at [address] [place] [address] ([phone])  12/07/2017 9:30 AM  
 Appointment with [address] at [address] [place] [address] ([phone])  
  
 12/12/2017 9:00 AM  
  Appointment with [address] at [address] [place] [address] ([phone])  
  
 12/14/2017 8:30 AM  
  Appointment with [address] at [address] [place] [address] Street ([phone])  
  
 12/14/2017 9:30 AM  
  Appointment with [address] at [address] [place] [address] ([phone])  
  
 12/19/2017 9:00 AM  
  Appointment with [address] at [address] [place] [address] ([phone])  
  
 12/21/2017 8:30 AM  
  Appointment with [address] at [address] [place] [address] ([phone])  
  
 12/26/2017 9:00 AM  
  Appointment with [address] at [address] [place] [address] ([phone])  
  
 12/28/2017 8:30 AM  
  Appointment with [address] at [address] [place] [address] ([phone])  
  
 12/28/2017 9:30 AM  
  Appointment with [address] at [address] [place] [address] ([phone])  
  
 01/02/2018 9:00 AM  
  Appointment with [address] at [address] [place] [address] ([phone])  
  
 01/04/2018 8:30 AM  
  Appointment with [address] at [address] [place] [address] ([phone])  
  
 01/04/2018 9:30 AM  
  Appointment with [address] at [address] [place] [address] ([phone])  
  
 01/09/2018 9:00 AM  
  Appointment with [address] at [address] [place] [address] ([phone])  
  
 01/11/2018 8:30 AM  
  Appointment with [address] at [address] [place] [address] ([phone])  
  
 01/11/2018 9:30 AM  
  Appointment with [address] at [address] [place] [address] ([phone])  
  
 01/16/2018 9:00 AM  
  Appointment with [address] at [address] [place] [address] ([phone])  
  
 01/18/2018 8:30 AM  
  Appointment with [address] at [address] [place] [address] ([phone])  
  
 01/18/2018 9:30 AM  
 Appointment with [address] at [address] [place] [address] ([phone])  
  
 01/23/2018 9:00 AM  
  Appointment with [address] at [address] [place] [address] ([phone])  
  
 01/25/2018 8:30 AM  
  Appointment with [address] at [address] [place] [address] ([phone])  
  
 01/25/2018 9:30 AM  
  Appointment with [address] at [address] [place] [address] ([phone])  
  
 01/30/2018 9:00 AM  
  Appointment with [address] at [address] [place] [address] ([phone])  
  
 02/01/2018 8:30 AM  
  Appointment with [address] at [address] [place] [address] ([phone])  
  
 02/01/2018 9:30 AM  
  Appointment with [address] at [address] [place] [address] ([phone])  
  
 02/06/2018 9:00 AM  
  Appointment with [address] at [address] [place] [address] ([phone])  
  
 02/08/2018 8:30 AM  
  Appointment with [address] at [address] [place] [address] ([phone])  
  
 02/08/2018 9:30 AM  
  Appointment with [address] at [address] [place] [address] ([phone])  
  
 02/13/2018 9:00 AM  
  Appointment with [address] at [address] [place] [address] ([phone])  
  
 02/15/2018 8:30 AM  
  Appointment with [address] at [address] [place] [address] ([phone])  
  
 02/15/2018 9:30 AM  
  Appointment with [address] at [address] [place] [address] ([phone])  
  
 02/20/2018 9:00 AM  
  Appointment with [address] at [address] [place] [address] ([phone])  
  
 02/27/2018 9:00 AM  
  Appointment with [address] at [address] [place] [address] ([phone])  03/06/2018 9:00 AM  
  Appointment with [address] at [address] [place] [address] ([phone])  
  
 03/13/2018 9:00 AM  
  Appointment with [address] at [address] [place] [address] ([phone])  
  
 03/20/2018 9:00 AM  
 Appointment with [address] [place] [address] at [address] [place] [address] ([phone]) Patient Instructions A Healthy Lifestyle: Care Instructions Your Care Instructions A healthy lifestyle can help you feel good, stay at a healthy weight, and have plenty of energy for both work and play. A healthy lifestyle is something you can share with your whole family. A healthy lifestyle also can lower your risk for serious health problems, such as high blood pressure, heart disease, and diabetes. You can follow a few steps listed below to improve your health and the health of your family. Follow-up care is a key part of your treatment and safety. Be sure to make and go to all appointments, and call your doctor if you are having problems. It's also a good idea to know your test results and keep a list of the medicines you take. How can you care for yourself at home? · Do not eat too much sugar, fat, or fast foods. You can still have dessert and treats now and then. The goal is moderation. · Start small to improve your eating habits. Pay attention to portion sizes, drink less juice and soda pop, and eat more fruits and vegetables. ¨ Eat a healthy amount of food. A 3-ounce serving of meat, for example, is about the size of a deck of cards. Fill the rest of your plate with vegetables and whole grains. ¨ Limit the amount of soda and sports drinks you have every day. Drink more water when you are thirsty. ¨ Eat at least 5 servings of fruits and vegetables every day. It may seem like a lot, but it is not hard to reach this goal. A serving or helping is 1 piece of fruit, 1 cup of vegetables, or 2 cups of leafy, raw vegetables. Have an apple or some carrot sticks as an afternoon snack instead of a candy bar. Try to have fruits and/or vegetables at every meal. 
· Make exercise part of your daily routine. You may want to start with simple activities, such as walking, bicycling, or slow swimming.  Try to be active 30 to 60 minutes every day. You do not need to do all 30 to 60 minutes all at once. For example, you can exercise 3 times a day for 10 or 20 minutes. Moderate exercise is safe for most people, but it is always a good idea to talk to your doctor before starting an exercise program. 
· Keep moving. Micheline Dove the lawn, work in the garden, or [employer]. Take the stairs instead of the elevator at work. · If you smoke, quit. People who smoke have an increased risk for heart attack, stroke, cancer, and other lung illnesses. Quitting is hard, but there are ways to boost your chance of quitting tobacco for good. ¨ Use nicotine gum, patches, or lozenges. ¨ Ask your doctor about stop-smoking programs and medicines. ¨ Keep trying. In addition to reducing your risk of diseases in the future, you will notice some benefits soon after you stop using tobacco. If you have shortness of breath or asthma symptoms, they will likely get better within a few weeks after you quit. · Limit how much alcohol you drink. Moderate amounts of alcohol (up to 2 drinks a day for men, 1 drink a day for women) are okay. But drinking too much can lead to liver problems, high blood pressure, and other health problems. Family health If you have a family, there are many things you can do together to improve your health. · Eat meals together as a family as often as possible. · Eat healthy foods. This includes fruits, vegetables, lean meats and dairy, and whole grains. · Include your family in your fitness plan. Most people think of activities such as jogging or tennis as the way to fitness, but there are many ways you and your family can be more active. Anything that makes you breathe hard and gets your heart pumping is exercise. Here are some tips: 
¨ Walk to do errands or to take your child to school or the bus. ¨ Go for a family bike ride after dinner instead of watching TV. Where can you learn more? Go to http://paulie-nestor.info/. Enter R132 in the search box to learn more about \"A Healthy Lifestyle: Care Instructions. \" Current as of: May 12, 2017 Content Version: 11.4 © 8888-8881 [employer]. Care instructions adapted under license by [employer] (which disclaims liability or warranty for this information). If you have questions about a medical condition or this instruction, always ask your healthcare professional. [address] any warranty or liability for your use of this information. Introducing [facility]! Dear Melany Verma: 
Thank you for requesting a Keepstream account. Our records indicate that you already have an active Keepstream account. You can access your account anytime at [email or web address] Did you know that you can access your hospital and ER discharge instructions at any time in Keepstream? You can also review all of your test results from your hospital stay or ER visit. Additional Information If you have questions, please visit the Frequently Asked Questions section of the Keepstream website at [email or web address] Remember, Keepstream is NOT to be used for urgent needs. For medical emergencies, dial 911. Now available from your iPhone and Android! Please provide this summary of care documentation to your next provider. Your primary care clinician is listed as Juan Herrera. If you have any questions after today's visit, please call [phone].
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
## 2017-11-29 NOTE — PROGRESS NOTES
Patient is here for follow up for headaches  Patient had heart attack in October  Cardiologist took him off all medications until he was seen by each doctor

## 2017-11-30 ENCOUNTER — APPOINTMENT (OUTPATIENT)
Dept: CARDIAC REHAB | Age: 52
End: 2017-11-30
Payer: MEDICAID

## 2017-11-30 ENCOUNTER — TELEPHONE (OUTPATIENT)
Dept: CARDIAC REHAB | Age: 52
End: 2017-11-30

## 2017-11-30 NOTE — TELEPHONE ENCOUNTER
11/30/2017: Cardiac Wellness: Claudean Holy regarding absence from the Cardiac Wellness Program. Still needs to have a MRI done before he can return per neurologist. . Dangelo Mahan will try her best to keep in touch with us so we are informed of his return.   Stan Morrissey

## 2017-12-01 ENCOUNTER — TELEPHONE (OUTPATIENT)
Dept: NEUROLOGY | Age: 52
End: 2017-12-01

## 2017-12-01 NOTE — TELEPHONE ENCOUNTER
Calling to see if the office notes are ready to be faxed over the , may also fax work status if available.    Fax: [phone]  Attn: Bo De La O

## 2017-12-05 ENCOUNTER — APPOINTMENT (OUTPATIENT)
Dept: CARDIAC REHAB | Age: 52
End: 2017-12-05

## 2017-12-07 ENCOUNTER — APPOINTMENT (OUTPATIENT)
Dept: CARDIAC REHAB | Age: 52
End: 2017-12-07

## 2017-12-12 ENCOUNTER — APPOINTMENT (OUTPATIENT)
Dept: CARDIAC REHAB | Age: 52
End: 2017-12-12

## 2017-12-13 ENCOUNTER — HOSPITAL ENCOUNTER (OUTPATIENT)
Dept: MRI IMAGING | Age: 52
Discharge: HOME | End: 2017-12-13
Attending: PSYCHIATRY & NEUROLOGY
Payer: OTHER MISCELLANEOUS

## 2017-12-13 DIAGNOSIS — R90.89 ABNORMAL BRAIN MRI: ICD-10-CM

## 2017-12-13 PROCEDURE — A9576 INJ PROHANCE MULTIPACK: HCPCS | Performed by: PSYCHIATRY & NEUROLOGY

## 2017-12-13 PROCEDURE — 74011250636 HC RX REV CODE- 250/636: Performed by: PSYCHIATRY & NEUROLOGY

## 2017-12-13 PROCEDURE — 70553 MRI BRAIN STEM W/O & W/DYE: CPT

## 2017-12-13 RX ADMIN — GADOTERIDOL 17 ML: 279.3 INJECTION, SOLUTION INTRAVENOUS at 08:57

## 2017-12-14 ENCOUNTER — APPOINTMENT (OUTPATIENT)
Dept: CARDIAC REHAB | Age: 52
End: 2017-12-14

## 2017-12-18 ENCOUNTER — TELEPHONE (OUTPATIENT)
Dept: NEUROLOGY | Age: 52
End: 2017-12-18

## 2017-12-18 DIAGNOSIS — G44.329 CHRONIC POST-TRAUMATIC HEADACHE, NOT INTRACTABLE: ICD-10-CM

## 2017-12-18 DIAGNOSIS — G43.709 CHRONIC MIGRAINE WITHOUT AURA WITHOUT STATUS MIGRAINOSUS, NOT INTRACTABLE: ICD-10-CM

## 2017-12-18 RX ORDER — GABAPENTIN 600 MG/1
TABLET ORAL
Qty: 90 TAB | Refills: 5 | Status: SHIPPED | OUTPATIENT
Start: 2017-12-18 | End: 2018-03-08 | Stop reason: SDUPTHER

## 2017-12-18 NOTE — TELEPHONE ENCOUNTER
----- Message from Yobani Chacko MD sent at 12/13/2017 10:21 AM EST -----  MRI of the brain came back okay. Please inform.

## 2017-12-19 ENCOUNTER — APPOINTMENT (OUTPATIENT)
Dept: CARDIAC REHAB | Age: 52
End: 2017-12-19

## 2017-12-21 ENCOUNTER — OFFICE VISIT (OUTPATIENT)
Dept: INTERNAL MEDICINE CLINIC | Age: 52
End: 2017-12-21

## 2017-12-21 ENCOUNTER — TELEPHONE (OUTPATIENT)
Dept: NEUROLOGY | Age: 52
End: 2017-12-21

## 2017-12-21 ENCOUNTER — APPOINTMENT (OUTPATIENT)
Dept: CARDIAC REHAB | Age: 52
End: 2017-12-21

## 2017-12-21 VITALS
HEIGHT: 65 IN | TEMPERATURE: 98.6 F | DIASTOLIC BLOOD PRESSURE: 85 MMHG | BODY MASS INDEX: 30.99 KG/M2 | WEIGHT: 186 LBS | RESPIRATION RATE: 16 BRPM | OXYGEN SATURATION: 96 % | HEART RATE: 62 BPM | SYSTOLIC BLOOD PRESSURE: 125 MMHG

## 2017-12-21 DIAGNOSIS — E78.00 HYPERCHOLESTEREMIA: Primary | ICD-10-CM

## 2017-12-21 DIAGNOSIS — Z12.11 COLON CANCER SCREENING: ICD-10-CM

## 2017-12-21 PROBLEM — F33.9 RECURRENT DEPRESSION (HCC): Status: ACTIVE | Noted: 2017-12-21

## 2017-12-21 RX ORDER — CLOPIDOGREL BISULFATE 75 MG/1
TABLET ORAL
Refills: 5 | COMMUNITY
Start: 2017-12-08

## 2017-12-21 NOTE — TELEPHONE ENCOUNTER
----- Message from [facility] sent at 12/20/2017  3:31 PM EST -----  Regarding: Dr. Neli Nicholas with [facility] would like a call back to confirm which strength of Gabapentin the pt is supposed to take. Two gabapentin prescriptions were received one for 600 mg and one for 300 mg. Donavon Monteiro can be reached at (p) [phone].

## 2017-12-21 NOTE — PROGRESS NOTES
Chief Complaint   Patient presents with    Cholesterol Problem     he is a 45y.o. year old male who presents for follow-up of hyperlipidemia. Cardiovascular risk analysis - hyperlipidemia. Compliance with treatment thus far has been good. New concerns: Pt is not sure if he is due for a follow up with his cardiologist or when the last visit was. It also seems that he has not gone to cardiac rehab, although several messages in chart show efforts by cardiac rehab to get him in. Denies any sob, chest pain or hernandez. .     Social History, Diet and Lifestyle: generally follows a low fat low cholesterol diet, generally follows a low sodium diet      Lab Results  Component Value Date/Time   Cholesterol, total 242 08/10/2017 12:14 PM   HDL Cholesterol 56 08/10/2017 12:14 PM   LDL, calculated 160 08/10/2017 12:14 PM   Triglyceride 131 08/10/2017 12:14 PM        ROS: taking medications as instructed, no medication side effects noted, no TIA's, no chest pain on exertion, no dyspnea on exertion, no swelling of ankles. Reviewed and agree with Nurse Note and duplicated in this note. Reviewed PmHx, RxHx, FmHx, SocHx, AllgHx and updated and dated in the chart.     Family History   Problem Relation Age of Onset    Heart Disease Mother     Stroke Mother        Past Medical History:   Diagnosis Date    Anxiety disorder     CAD (coronary artery disease) 10/10/2017    STEMI and stents    Depression     Diarrhea     Falls     Headache     Joint pain     Joint pain     Memory disorder     Memory loss     Muscle pain     Muscle pain     Muscle weakness     Muscle weakness     Torn rotator cuff     right side    Vertigo     Visual disturbance     Visual disturbance       Social History     Social History    Marital status:      Spouse name: N/A    Number of children: N/A    Years of education: N/A     Social History Main Topics    Smoking status: Never Smoker    Smokeless tobacco: Never Used    Alcohol use No    Drug use: None    Sexual activity: Not Asked     Other Topics Concern    None     Social History Narrative      Current Outpatient Prescriptions   Medication Sig Dispense Refill    clopidogrel (PLAVIX) 75 mg tab TAKE 1 TABLET BY ORAL ROUTE EVERY DAY  5    gabapentin (NEURONTIN) 600 mg tablet TAKE ONE(1) TABLET BY MOUTH THREE(3) TIMES DAILY 90 Tab 5    ARIPiprazole (ABILIFY) 2 mg tablet Take  by mouth daily.  rosuvastatin (CRESTOR) 10 mg tablet Take 1 Tab by mouth nightly. 30 Tab 6    aspirin 81 mg chewable tablet Take 1 Tab by mouth daily. 30 Tab 6    metoprolol tartrate (LOPRESSOR) 25 mg tablet Take 1 Tab by mouth every twelve (12) hours. 60 Tab 6    traZODone (DESYREL) 50 mg tablet Take 25 mg by mouth nightly.  sertraline (ZOLOFT) 100 mg tablet Take 200 mg by mouth daily.  ticagrelor (BRILINTA) 90 mg tablet Take 1 Tab by mouth every twelve (12) hours every twelve (12) hours.  61 Tab 6     No Known Allergies  Past Medical History:   Diagnosis Date    Anxiety disorder     CAD (coronary artery disease) 10/10/2017    STEMI and stents    Depression     Diarrhea     Falls     Headache     Joint pain     Joint pain     Memory disorder     Memory loss     Muscle pain     Muscle pain     Muscle weakness     Muscle weakness     Torn rotator cuff     right side    Vertigo     Visual disturbance     Visual disturbance      Past Surgical History:   Procedure Laterality Date    HX ORTHOPAEDIC  02/2017    rotator cuff repair    HX OTHER SURGICAL  2016    cervical radiculopathy           Objective:     Vitals:    12/21/17 0859   Weight: 186 lb (84.4 kg)   Height: 5' 5\" (1.651 m)       Physical Examination: General appearance - alert, well appearing, and in no distress  Eyes - pupils equal and reactive, extraocular eye movements intact  Ears - bilateral TM's and external ear canals normal  Nose - normal and patent, no erythema, discharge or polyps  Mouth - mucous membranes moist, pharynx normal without lesions  Neck - supple, no significant adenopathy  Chest - clear to auscultation, no wheezes, rales or rhonchi, symmetric air entry  Heart - normal rate, regular rhythm, normal S1, S2, no murmurs, rubs, clicks or gallops  Abdomen - soft, nontender, nondistended, no masses or organomegaly  Musculoskeletal - no joint tenderness, deformity or swelling  Extremities - peripheral pulses normal, no pedal edema, no clubbing or cyanosis  Skin - normal coloration and turgor, no rashes, no suspicious skin lesions noted    Assessment/ Plan:   Diagnoses and all orders for this visit:    1. Hypercholesteremia  -     LIPID PANEL  -     METABOLIC PANEL, COMPREHENSIVE    2. Colon cancer screening  -     OCCULT BLOOD, IMMUNOASSAY (FIT)       Follow-up Disposition:  Return in about 3 months (around 3/21/2018) for cholesterol. I have discussed the diagnosis with the patient and the intended plan as seen in the above orders. The patient has received an after-visit summary and questions were answered concerning future plans. Medication Side Effects and Warnings were discussed with patient: yes  Patient Labs were reviewed and or requested: yes  Patient Past Records were reviewed and or requested  yes  I have discussed the diagnosis with the patient and the intended plan as seen in the above orders. The patient has received an after-visit summary and questions were answered concerning future plans. Pt agrees to call or return to clinic and/or go to closest ER with any worsening of symptoms. This may include, but not limited to increased fever (>100.4) with NSAIDS or Tylenol, increased edema, confusion, rash, worsening of presenting symptoms. 1) Remember to stay active and/or exercise regularly (I suggest 30-45 minutes daily)   2) For reliable dietary information, go to www. EATRIGHT.org. You may wish to consider seeing the nutritionist at [facility] at #[phone] or [phone], also consider the Mediterranean diet.   3) I routinely suggest a complete physical exam once each year (your birth month)

## 2017-12-21 NOTE — MR AVS SNAPSHOT
Visit Information Date & Time Provider Department Dept. Phone Encounter #  
 12/21/2017  9:30 AM [address] [place] [address] MD Siobhan [facility] Sports Medicine and [place] [id number] Follow-up Instructions Return in about 3 months (around 3/21/2018) for cholesterol. Follow-up and Disposition History Your Appointments 2/16/2018 10:00 AM  
Any with [address] [place] [address] MD Siobhan  
[address] [place] [address] and Primary Care [facility] Appt Note: follow up 6mnths cholesterol [address] [place]  
  
   
 [address] [id number] Upcoming Health Maintenance Date Due FOBT Q 1 YEAR AGE 50-75 3/3/2017 DTaP/Tdap/Td series (2 - Td) 3/3/2026 Allergies as of 12/21/2017  Review Complete On: 12/21/2017 By: [address] [place] [address] MD Siobhan  
 No Known Allergies Current Immunizations  Reviewed on 11/2/2017 Name Date Influenza Vaccine 11/1/2012 Tdap 3/3/2016 Not reviewed this visit You Were Diagnosed With   
  
 Codes Comments Hypercholesteremia    -  Primary ICD-10-CM: E78.00 ICD-9-CM: 272.0 Colon cancer screening     ICD-10-CM: Z12.11 ICD-9-CM: V76.51 Vitals BP Pulse Temp Resp Height(growth percentile) Weight(growth percentile) 125/85 (BP 1 Location: Left arm, BP Patient Position: Sitting) 62 98.6 °F (37 °C) (Oral) 16 5' 5\" (1.651 m) 186 lb (84.4 kg) SpO2 BMI Smoking Status 96% 30.95 kg/m2 Never Smoker Vitals History BMI and BSA Data Body Mass Index Body Surface Area 30.95 kg/m 2 1.97 m 2 Preferred Pharmacy Pharmacy Name Phone [facility] [place], [address] RUDD Your Updated Medication List  
  
   
This list is accurate as of: 12/21/17  9:31 AM.  Always use your most recent med list.  
  
  
  
  
 ARIPiprazole 2 mg tablet Commonly known as:  ABILIFY Take  by mouth daily. aspirin 81 mg chewable tablet Take 1 Tab by mouth daily. clopidogrel 75 mg Tab Commonly known as:  PLAVIX TAKE 1 TABLET BY ORAL ROUTE EVERY DAY  
  
 gabapentin 600 mg tablet Commonly known as:  NEURONTIN  
TAKE ONE(1) TABLET BY MOUTH THREE(3) TIMES DAILY  
  
 metoprolol tartrate 25 mg tablet Commonly known as:  LOPRESSOR Take 1 Tab by mouth every twelve (12) hours. rosuvastatin 10 mg tablet Commonly known as:  CRESTOR Take 1 Tab by mouth nightly. ticagrelor 90 mg tablet Commonly known as:  [employer] Take 1 Tab by mouth every twelve (12) hours every twelve (12) hours. traZODone 50 mg tablet Commonly known as:  Orma Paddy Take 25 mg by mouth nightly. ZOLOFT 100 mg tablet Generic drug:  sertraline Take 200 mg by mouth daily. We Performed the Following LIPID PANEL [34161 CPT(R)] METABOLIC PANEL, COMPREHENSIVE [48303 CPT(R)] OCCULT BLOOD, IMMUNOASSAY (FIT) [id number] CPT(R)] Follow-up Instructions Return in about 3 months (around 3/21/2018) for cholesterol.   
  
To-Do List   
 01/02/2018 9:00 AM  
  Appointment with [address] at [address] [place] [address] ([phone])  
  
 01/04/2018 8:30 AM  
  Appointment with [address] at [address] [place] [address] ([phone])  
  
 01/04/2018 9:30 AM  
  Appointment with [address] at [address] [place] [address] ([phone])  
  
 01/09/2018 9:00 AM  
  Appointment with [address] at [address] [place] [address] ([phone])  
  
 01/11/2018 8:30 AM  
  Appointment with [address] at [address] [place] [address] ([phone])  
  
 01/11/2018 9:30 AM  
  Appointment with [address] at [address] [place] [address] ([phone])  
  
 01/16/2018 9:00 AM  
  Appointment with [address] at [address] [place] [address] ([phone])  
  
 01/18/2018 8:30 AM  
 Appointment with [address] at [address] [place] [address] ([phone])  
  
 01/18/2018 9:30 AM  
  Appointment with [address] at [address] [place] [address] ([phone])  
  
 01/23/2018 9:00 AM  
  Appointment with [address] at [address] [place] [address] ([phone])  
  
 01/25/2018 8:30 AM  
  Appointment with [address] at [address] [place] [address] ([phone])  
  
 01/25/2018 9:30 AM  
  Appointment with [address] at [address] [place] [address] ([phone])  
  
 01/30/2018 9:00 AM  
  Appointment with [address] at [address] [place] [address] ([phone])  
  
 02/01/2018 8:30 AM  
  Appointment with [address] at [address] [place] [address] ([phone])  
  
 02/01/2018 9:30 AM  
  Appointment with [address] at [address] [place] [address] ([phone])  
  
 02/06/2018 9:00 AM  
  Appointment with [address] at [address] [place] [address] ([phone])  
  
 02/08/2018 8:30 AM  
  Appointment with [address] at [address] [place] [address] ([phone])  
  
 02/08/2018 9:30 AM  
  Appointment with [address] at [address] [place] [address] ([phone])  
  
 02/13/2018 9:00 AM  
  Appointment with [address] at [address] [place] [address] ([phone])  
  
 02/15/2018 8:30 AM  
  Appointment with [address] at [address] [place] [address] ([phone])  
  
 02/15/2018 9:30 AM  
  Appointment with [address] at [address] [place] [address] ([phone])  
  
 02/20/2018 9:00 AM  
  Appointment with [address] at [address] [place] [address] ([phone])  
  
 02/27/2018 9:00 AM  
  Appointment with [address] at [address] [place] [address] ([phone])  03/06/2018 9:00 AM  
  Appointment with [address] at [address] [place] [address] ([phone])  
  
 03/13/2018 9:00 AM  
 Appointment with [address] at [address] [place] [address] ([phone])  
  
 03/20/2018 9:00 AM  
  Appointment with [address]  at [address] [place] [address] ([phone]) [facility] SERVICES! Dear Richy Bhatti: 
Thank you for requesting a [employer]! account. Our records indicate that you already have an active [employer]! account. You can access your account anytime at [email or web address] Did you know that you can access your hospital and ER discharge instructions at any time in [employer]!? You can also review all of your test results from your hospital stay or ER visit. Additional Information If you have questions, please visit the Frequently Asked Questions section of the [employer]! website at [email or web address] Remember, Jamba! is NOT to be used for urgent needs. For medical emergencies, dial 911. Now available from your iPhone and Android! Please provide this summary of care documentation to your next provider. Your primary care clinician is listed as Leobardo Hanley. If you have any questions after today's visit, please call [phone].

## 2017-12-22 LAB
ALBUMIN SERPL-MCNC: 4.3 G/DL (ref 3.5–5.5)
ALBUMIN/GLOB SERPL: 1.8 {RATIO} (ref 1.2–2.2)
ALP SERPL-CCNC: 72 IU/L (ref 39–117)
ALT SERPL-CCNC: 36 IU/L (ref 0–44)
AST SERPL-CCNC: 33 IU/L (ref 0–40)
BILIRUB SERPL-MCNC: 1.2 MG/DL (ref 0–1.2)
BUN SERPL-MCNC: 15 MG/DL (ref 6–24)
BUN/CREAT SERPL: 15 (ref 9–20)
CALCIUM SERPL-MCNC: 9.2 MG/DL (ref 8.7–10.2)
CHLORIDE SERPL-SCNC: 103 MMOL/L (ref 96–106)
CHOLEST SERPL-MCNC: 150 MG/DL (ref 100–199)
CO2 SERPL-SCNC: 25 MMOL/L (ref 18–29)
CREAT SERPL-MCNC: 1 MG/DL (ref 0.76–1.27)
GLOBULIN SER CALC-MCNC: 2.4 G/DL (ref 1.5–4.5)
GLUCOSE SERPL-MCNC: 104 MG/DL (ref 65–99)
HDLC SERPL-MCNC: 60 MG/DL
LDLC SERPL CALC-MCNC: 65 MG/DL (ref 0–99)
POTASSIUM SERPL-SCNC: 3.8 MMOL/L (ref 3.5–5.2)
PROT SERPL-MCNC: 6.7 G/DL (ref 6–8.5)
SODIUM SERPL-SCNC: 145 MMOL/L (ref 134–144)
TRIGL SERPL-MCNC: 124 MG/DL (ref 0–149)
VLDLC SERPL CALC-MCNC: 25 MG/DL (ref 5–40)

## 2017-12-26 ENCOUNTER — APPOINTMENT (OUTPATIENT)
Dept: CARDIAC REHAB | Age: 52
End: 2017-12-26

## 2017-12-28 ENCOUNTER — APPOINTMENT (OUTPATIENT)
Dept: CARDIAC REHAB | Age: 52
End: 2017-12-28

## 2017-12-28 ENCOUNTER — TELEPHONE (OUTPATIENT)
Dept: CARDIAC REHAB | Age: 52
End: 2017-12-28

## 2017-12-28 NOTE — TELEPHONE ENCOUNTER
12/28/2017 Cardiac Wellness: Cancelled all future appointments for Mr. Nancy Esquivel d/t health issues. Printed his schedule in case his wife calls to reschedule his appointments. Has been told to hold off from rehab by neurologist to run tests, but has missed too many appointments from when I was last told he was going to try to return.  Ananya Walter No sleep apnea, removable prosthetic devices or family history of malignant hyperthermia. Care fusion kit and instructions given and reviewed. PCP is aware of the surgery. No participation in clinical trial or research study. Does not meet criteria for special population at this time. Received verbal permission to access Care Everywhere.

## 2018-01-02 ENCOUNTER — APPOINTMENT (OUTPATIENT)
Dept: CARDIAC REHAB | Age: 53
End: 2018-01-02

## 2018-01-02 LAB — HEMOCCULT STL QL IA: NEGATIVE

## 2018-01-04 ENCOUNTER — APPOINTMENT (OUTPATIENT)
Dept: CARDIAC REHAB | Age: 53
End: 2018-01-04

## 2018-01-09 ENCOUNTER — APPOINTMENT (OUTPATIENT)
Dept: CARDIAC REHAB | Age: 53
End: 2018-01-09

## 2018-01-10 ENCOUNTER — TELEPHONE (OUTPATIENT)
Dept: CARDIAC REHAB | Age: 53
End: 2018-01-10

## 2018-01-11 ENCOUNTER — TELEPHONE (OUTPATIENT)
Dept: NEUROLOGY | Age: 53
End: 2018-01-11

## 2018-01-11 ENCOUNTER — APPOINTMENT (OUTPATIENT)
Dept: CARDIAC REHAB | Age: 53
End: 2018-01-11

## 2018-01-11 NOTE — TELEPHONE ENCOUNTER
Pt wife calling, stated she would like to know when Dr. Estephanie Malik would like patient to come back in for a f/u appt. Stated that pt had MRI in December.

## 2018-01-16 ENCOUNTER — APPOINTMENT (OUTPATIENT)
Dept: CARDIAC REHAB | Age: 53
End: 2018-01-16

## 2018-01-18 ENCOUNTER — APPOINTMENT (OUTPATIENT)
Dept: CARDIAC REHAB | Age: 53
End: 2018-01-18

## 2018-01-23 ENCOUNTER — APPOINTMENT (OUTPATIENT)
Dept: CARDIAC REHAB | Age: 53
End: 2018-01-23

## 2018-01-25 ENCOUNTER — APPOINTMENT (OUTPATIENT)
Dept: CARDIAC REHAB | Age: 53
End: 2018-01-25

## 2018-01-30 ENCOUNTER — APPOINTMENT (OUTPATIENT)
Dept: CARDIAC REHAB | Age: 53
End: 2018-01-30

## 2018-02-01 ENCOUNTER — APPOINTMENT (OUTPATIENT)
Dept: CARDIAC REHAB | Age: 53
End: 2018-02-01

## 2018-02-06 ENCOUNTER — APPOINTMENT (OUTPATIENT)
Dept: CARDIAC REHAB | Age: 53
End: 2018-02-06

## 2018-02-08 ENCOUNTER — APPOINTMENT (OUTPATIENT)
Dept: CARDIAC REHAB | Age: 53
End: 2018-02-08

## 2018-02-13 ENCOUNTER — APPOINTMENT (OUTPATIENT)
Dept: CARDIAC REHAB | Age: 53
End: 2018-02-13

## 2018-02-15 ENCOUNTER — APPOINTMENT (OUTPATIENT)
Dept: CARDIAC REHAB | Age: 53
End: 2018-02-15

## 2018-02-16 ENCOUNTER — OFFICE VISIT (OUTPATIENT)
Dept: INTERNAL MEDICINE CLINIC | Age: 53
End: 2018-02-16

## 2018-02-16 VITALS
DIASTOLIC BLOOD PRESSURE: 82 MMHG | RESPIRATION RATE: 18 BRPM | TEMPERATURE: 97.8 F | OXYGEN SATURATION: 97 % | HEIGHT: 65 IN | WEIGHT: 185.7 LBS | SYSTOLIC BLOOD PRESSURE: 115 MMHG | BODY MASS INDEX: 30.94 KG/M2 | HEART RATE: 51 BPM

## 2018-02-16 DIAGNOSIS — E55.9 HYPOVITAMINOSIS D: ICD-10-CM

## 2018-02-16 DIAGNOSIS — E78.00 HYPERCHOLESTEREMIA: Primary | ICD-10-CM

## 2018-02-16 DIAGNOSIS — R73.9 ELEVATED BLOOD SUGAR: ICD-10-CM

## 2018-02-16 DIAGNOSIS — F33.9 RECURRENT DEPRESSION (HCC): ICD-10-CM

## 2018-02-16 DIAGNOSIS — I21.11 ST ELEVATION (STEMI) MYOCARDIAL INFARCTION INVOLVING RIGHT CORONARY ARTERY (HCC): ICD-10-CM

## 2018-02-16 NOTE — ASSESSMENT & PLAN NOTE
Stable, based on history, physical exam and review of pertinent labs, studies and medications; meds reconciled; continue current treatment plan. Key Psychotherapeutic Meds             traZODone (DESYREL) 50 mg tablet  (Taking) Take 25 mg by mouth nightly. sertraline (ZOLOFT) 100 mg tablet  (Taking) Take 200 mg by mouth daily. ARIPiprazole (ABILIFY) 2 mg tablet Take  by mouth daily.         Other [address]             gabapentin (NEURONTIN) 600 mg tablet  (Taking) TAKE ONE(1) TABLET BY MOUTH THREE(3) TIMES DAILY        Lab Results   Component Value Date/Time    Sodium 145 12/21/2017 09:21 AM    Creatinine 1.00 12/21/2017 09:21 AM    WBC 7.9 10/11/2017 02:31 AM    ALT (SGPT) 36 12/21/2017 09:21 AM    AST (SGOT) 33 12/21/2017 09:21 AM

## 2018-02-16 NOTE — ASSESSMENT & PLAN NOTE
Stable, based on history, physical exam and review of pertinent labs, studies and medications; meds reconciled; continue current treatment plan. Key CAD CHF Meds             clopidogrel (PLAVIX) 75 mg tab  (Taking) TAKE 1 TABLET BY ORAL ROUTE EVERY DAY    aspirin 81 mg chewable tablet  (Taking) Take 1 Tab by mouth daily. ticagrelor (BRILINTA) 90 mg tablet  (Taking) Take 1 Tab by mouth every twelve (12) hours every twelve (12) hours. metoprolol tartrate (LOPRESSOR) 25 mg tablet  (Taking) Take 1 Tab by mouth every twelve (12) hours. Key Antihyperlipidemia Meds             rosuvastatin (CRESTOR) 10 mg tablet  (Taking) Take 1 Tab by mouth nightly.         Lab Results   Component Value Date/Time    Sodium 145 12/21/2017 09:21 AM    Potassium 3.8 12/21/2017 09:21 AM    Cholesterol, total 150 12/21/2017 09:21 AM    HDL Cholesterol 60 12/21/2017 09:21 AM    LDL, calculated 65 12/21/2017 09:21 AM    Triglyceride 124 12/21/2017 09:21 AM

## 2018-02-16 NOTE — MR AVS SNAPSHOT
[address] [facility] 
 
 
 Kathy Aguilarona [address] [address] 
[phone] Patient: Katalina Riley MRN: [id number] :1965 Visit Information Date & Time Provider Department Dept. Phone Encounter #  
 2018 10:00 AM Troy Gonsales MD [facility] Sports Medicine and [address] [id number] Follow-up Instructions Return in about 6 months (around 2018) for Complete Physical.  
 Follow-up and Disposition History Your Appointments 2018 10:00 AM  
Any with Troy Gonsales MD  
[address] [place] [address] and [facility] Care [facility] Appt Note: follow up 6mnths cholesterol Kathy Amato 90 1 Medical [place]  
  
   
 Kathy Amato 90 [id number]  9:15 AM  
Any with Troy Gonsales MD  
[address] [place] [address] and Primary Care [facility]) Appt Note: 3 month follow up  
 [address] [facility]  
[phone] Upcoming Health Maintenance Date Due FOBT Q 1 YEAR AGE 50-75 2018 DTaP/Tdap/Td series (2 - Td) 3/3/2026 Allergies as of 2018  Review Complete On: 2018 By: Troy Gonsales MD  
 No Known Allergies Current Immunizations  Reviewed on 2017 Name Date Influenza Vaccine 2012 Tdap 3/3/2016 Not reviewed this visit You Were Diagnosed With   
  
 Codes Comments Hypercholesteremia    -  Primary ICD-10-CM: E78.00 ICD-9-CM: 272.0 Hypovitaminosis D     ICD-10-CM: E55.9 ICD-9-CM: 268.9 Elevated blood sugar     ICD-10-CM: R73.9 ICD-9-CM: 790.29 ST elevation (STEMI) myocardial infarction involving right coronary artery ([facility]     ICD-10-CM: I21.11 ICD-9-CM: 410.31 [facility]     ICD-10-CM: F33.9 ICD-9-CM: 296.30 Vitals BP Pulse Temp Resp Height(growth percentile) Weight(growth percentile) 115/82 (BP 1 Location: Right arm, BP Patient Position: Sitting) (!) 51 97.8 °F (36.6 °C) (Oral) 18 5' 5\" (1.651 m) 185 lb 11.2 oz (84.2 kg) SpO2 BMI Smoking Status 97% 30.9 kg/m2 Never Smoker BMI and BSA Data Body Mass Index Body Surface Area 30.9 kg/m 2 1.97 m 2 Preferred Pharmacy Pharmacy Name Phone [facility], [address] RUDD Your Updated Medication List  
  
   
This list is accurate as of: 2/16/18  9:58 AM.  Always use your most recent med list.  
  
  
  
  
 ARIPiprazole 2 mg tablet Commonly known as:  ABILIFY Take  by mouth daily. aspirin 81 mg chewable tablet Take 1 Tab by mouth daily. clopidogrel 75 mg Tab Commonly known as:  PLAVIX TAKE 1 TABLET BY ORAL ROUTE EVERY DAY  
  
 gabapentin 600 mg tablet Commonly known as:  NEURONTIN  
TAKE ONE(1) TABLET BY MOUTH THREE(3) TIMES DAILY  
  
 metoprolol tartrate 25 mg tablet Commonly known as:  LOPRESSOR Take 1 Tab by mouth every twelve (12) hours. rosuvastatin 10 mg tablet Commonly known as:  CRESTOR Take 1 Tab by mouth nightly. ticagrelor 90 mg tablet Commonly known as:  [employer] Take 1 Tab by mouth every twelve (12) hours every twelve (12) hours. traZODone 50 mg tablet Commonly known as:  Gladystine Gemma Take 25 mg by mouth nightly. ZOLOFT 100 mg tablet Generic drug:  sertraline Take 200 mg by mouth daily. We Performed the Following HEMOGLOBIN A1C WITH EAG [88817 CPT(R)] LIPID PANEL [60413 CPT(R)] VITAMIN D, 25 HYDROXY T8455124 CPT(R)] Follow-up Instructions Return in about 6 months (around 8/16/2018) for Complete Physical.  
  
  
Introducing [facility]! Dear Judy Ruvalcaba: 
Thank you for requesting a [employer] account. Our records indicate that you already have an active [employer] account.   You can access your account anytime at [email or web address] Did you know that you can access your hospital and ER discharge instructions at any time in [employer]? You can also review all of your test results from your hospital stay or ER visit. Additional Information If you have questions, please visit the Frequently Asked Questions section of the [employer] website at [email or web address] Remember, [employer] is NOT to be used for urgent needs. For medical emergencies, dial 911. Now available from your iPhone and Android! Please provide this summary of care documentation to your next provider. Your primary care clinician is listed as Radha Mcintosh. If you have any questions after today's visit, please call [phone].

## 2018-02-18 LAB
25(OH)D3+25(OH)D2 SERPL-MCNC: 13.6 NG/ML (ref 30–100)
CHOLEST SERPL-MCNC: 125 MG/DL (ref 100–199)
EST. AVERAGE GLUCOSE BLD GHB EST-MCNC: 126 MG/DL
HBA1C MFR BLD: 6 % (ref 4.8–5.6)
HDLC SERPL-MCNC: 51 MG/DL
LDLC SERPL CALC-MCNC: 57 MG/DL (ref 0–99)
TRIGL SERPL-MCNC: 86 MG/DL (ref 0–149)
VLDLC SERPL CALC-MCNC: 17 MG/DL (ref 5–40)

## 2018-02-20 ENCOUNTER — APPOINTMENT (OUTPATIENT)
Dept: CARDIAC REHAB | Age: 53
End: 2018-02-20

## 2018-02-27 ENCOUNTER — APPOINTMENT (OUTPATIENT)
Dept: CARDIAC REHAB | Age: 53
End: 2018-02-27

## 2018-03-06 ENCOUNTER — APPOINTMENT (OUTPATIENT)
Dept: CARDIAC REHAB | Age: 53
End: 2018-03-06

## 2018-03-06 ENCOUNTER — TELEPHONE (OUTPATIENT)
Dept: CARDIAC REHAB | Age: 53
End: 2018-03-06

## 2018-03-08 ENCOUNTER — OFFICE VISIT (OUTPATIENT)
Dept: NEUROLOGY | Age: 53
End: 2018-03-08

## 2018-03-08 VITALS
DIASTOLIC BLOOD PRESSURE: 94 MMHG | HEIGHT: 65 IN | HEART RATE: 69 BPM | OXYGEN SATURATION: 97 % | BODY MASS INDEX: 31.16 KG/M2 | SYSTOLIC BLOOD PRESSURE: 130 MMHG | RESPIRATION RATE: 14 BRPM | WEIGHT: 187 LBS

## 2018-03-08 DIAGNOSIS — G43.709 CHRONIC MIGRAINE WITHOUT AURA WITHOUT STATUS MIGRAINOSUS, NOT INTRACTABLE: ICD-10-CM

## 2018-03-08 DIAGNOSIS — G44.321 INTRACTABLE CHRONIC POST-TRAUMATIC HEADACHE: Primary | ICD-10-CM

## 2018-03-08 RX ORDER — GABAPENTIN 600 MG/1
TABLET ORAL
Qty: 105 TAB | Refills: 5 | Status: SHIPPED | OUTPATIENT
Start: 2018-03-08 | End: 2018-08-21 | Stop reason: SDUPTHER
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
## 2018-03-08 NOTE — PATIENT INSTRUCTIONS

## 2018-03-08 NOTE — MR AVS SNAPSHOT
[facility] [address] [facility] [address] 
[phone] Patient: Yvette Melchor MRN: [id number] :1965 Visit Information Date & Time Provider Department Dept. Phone Encounter #  
 3/8/2018  8:40 AM MD Vahid Mendoza [facility] at [address] [place] [address] [id number] Follow-up Instructions Return if symptoms worsen or fail to improve. Your Appointments 2018  9:15 AM  
Any with [address] [place] [address] MD Siobhan  
[address] [place] [address] and Primary Care [address] [place] [address]) Appt Note: 3 month follow up  
 Kathy Arringtonmikechavez 90 1 [facility]  
  
   
 [address] [phone] Upcoming Health Maintenance Date Due FOBT Q 1 YEAR AGE 50-75 2018 DTaP/Tdap/Td series (2 - Td) 3/3/2026 Allergies as of 3/8/2018  Review Complete On: 3/8/2018 By: Chen Lewis No Known Allergies Current Immunizations  Reviewed on 2017 Name Date Influenza Vaccine 2012 Tdap 3/3/2016 Not reviewed this visit You Were Diagnosed With   
  
 Codes Comments Intractable chronic post-traumatic headache    -  Primary ICD-10-CM: S24.231 ICD-9-CM: 339.22 Chronic migraine without aura without status migrainosus, not intractable     ICD-10-CM: L88.770 ICD-9-CM: 346.70 Vitals BP Pulse Resp Height(growth percentile) Weight(growth percentile) SpO2  
 (!) 130/94 69 14 5' 5\" (1.651 m) 187 lb (84.8 kg) 97% BMI Smoking Status 31.12 kg/m2 Never Smoker Vitals History BMI and BSA Data Body Mass Index Body Surface Area  
 31.12 kg/m 2 1.97 m 2 Preferred Pharmacy Pharmacy Name Phone [facility], [address] RUDD Your Updated Medication List  
  
   
This list is accurate as of 3/8/18  9:11 AM.  Always use your most recent med list.  
  
  
  
  
 ARIPiprazole 2 mg tablet Commonly known as:  ABILIFY Take  by mouth daily. aspirin 81 mg chewable tablet Take 1 Tab by mouth daily. clopidogrel 75 mg Tab Commonly known as:  PLAVIX TAKE 1 TABLET BY ORAL ROUTE EVERY DAY  
  
 gabapentin 600 mg tablet Commonly known as:  NEURONTIN  
600 mg in AM, 600 mg at noon, 900 mg at night  
  
 metoprolol tartrate 25 mg tablet Commonly known as:  LOPRESSOR Take 1 Tab by mouth every twelve (12) hours. rosuvastatin 10 mg tablet Commonly known as:  CRESTOR Take 1 Tab by mouth nightly. ticagrelor 90 mg tablet Commonly known as:  [employer] Take 1 Tab by mouth every twelve (12) hours every twelve (12) hours. traZODone 50 mg tablet Commonly known as:  Ollie Span Take 25 mg by mouth nightly. ZOLOFT 100 mg tablet Generic drug:  sertraline Take 200 mg by mouth daily. Prescriptions Sent to Pharmacy Refills  
 gabapentin (NEURONTIN) 600 mg tablet 5 Si mg in AM, 600 mg at noon, 900 mg at night Class: Normal  
 Pharmacy: [address] [place] [address], [address] [place] [address] #: [phone] Follow-up Instructions Return if symptoms worsen or fail to improve. To-Do List   
 2018 8:30 AM  
  Appointment with [address] [place] [address]  at [address] [place] [address] ([phone]) Patient Instructions A Healthy Lifestyle: Care Instructions Your Care Instructions A healthy lifestyle can help you feel good, stay at a healthy weight, and have plenty of energy for both work and play. A healthy lifestyle is something you can share with your whole family. A healthy lifestyle also can lower your risk for serious health problems, such as high blood pressure, heart disease, and diabetes. You can follow a few steps listed below to improve your health and the health of your family. Follow-up care is a key part of your treatment and safety. Be sure to make and go to all appointments, and call your doctor if you are having problems. It's also a good idea to know your test results and keep a list of the medicines you take. How can you care for yourself at home? · Do not eat too much sugar, fat, or fast foods. You can still have dessert and treats now and then. The goal is moderation. · Start small to improve your eating habits. Pay attention to portion sizes, drink less juice and soda pop, and eat more fruits and vegetables. ¨ Eat a healthy amount of food. A 3-ounce serving of meat, for example, is about the size of a deck of cards. Fill the rest of your plate with vegetables and whole grains. ¨ Limit the amount of soda and sports drinks you have every day. Drink more water when you are thirsty. ¨ Eat at least 5 servings of fruits and vegetables every day. It may seem like a lot, but it is not hard to reach this goal. A serving or helping is 1 piece of fruit, 1 cup of vegetables, or 2 cups of leafy, raw vegetables. Have an apple or some carrot sticks as an afternoon snack instead of a candy bar. Try to have fruits and/or vegetables at every meal. 
· Make exercise part of your daily routine. You may want to start with simple activities, such as walking, bicycling, or slow swimming. Try to be active 30 to 60 minutes every day. You do not need to do all 30 to 60 minutes all at once. For example, you can exercise 3 times a day for 10 or 20 minutes. Moderate exercise is safe for most people, but it is always a good idea to talk to your doctor before starting an exercise program. 
· Keep moving. Marcelo Sonicks the lawn, work in the garden, or [employer]. Take the stairs instead of the elevator at work. · If you smoke, quit. People who smoke have an increased risk for heart attack, stroke, cancer, and other lung illnesses.  Quitting is hard, but there are ways to boost your chance of quitting tobacco for good. ¨ Use nicotine gum, patches, or lozenges. ¨ Ask your doctor about stop-smoking programs and medicines. ¨ Keep trying. In addition to reducing your risk of diseases in the future, you will notice some benefits soon after you stop using tobacco. If you have shortness of breath or asthma symptoms, they will likely get better within a few weeks after you quit. · Limit how much alcohol you drink. Moderate amounts of alcohol (up to 2 drinks a day for men, 1 drink a day for women) are okay. But drinking too much can lead to liver problems, high blood pressure, and other health problems. Family health If you have a family, there are many things you can do together to improve your health. · Eat meals together as a family as often as possible. · Eat healthy foods. This includes fruits, vegetables, lean meats and dairy, and whole grains. · Include your family in your fitness plan. Most people think of activities such as jogging or tennis as the way to fitness, but there are many ways you and your family can be more active. Anything that makes you breathe hard and gets your heart pumping is exercise. Here are some tips: 
¨ Walk to do errands or to take your child to school or the bus. ¨ Go for a family bike ride after dinner instead of watching TV. Where can you learn more? Go to http://paulie-nestor.info/. Enter C574 in the search box to learn more about \"A Healthy Lifestyle: Care Instructions. \" Current as of: May 12, 2017 Content Version: 11.4 © 2485-5482 [employer]. Care instructions adapted under license by [employer] (which disclaims liability or warranty for this information). If you have questions about a medical condition or this instruction, always ask your healthcare professional. [address] any warranty or liability for your use of this information. Introducing [facility]! Dear Amanda Pages: 
Thank you for requesting a [employer] account. Our records indicate that you already have an active [employer] account. You can access your account anytime at [email or web address] Did you know that you can access your hospital and ER discharge instructions at any time in [employer]? You can also review all of your test results from your hospital stay or ER visit. Additional Information If you have questions, please visit the Frequently Asked Questions section of the [employer] website at [email or web address] Remember, [employer] is NOT to be used for urgent needs. For medical emergencies, dial 911. Now available from your iPhone and Android! Please provide this summary of care documentation to your next provider. Your primary care clinician is listed as Philly Nicole. If you have any questions after today's visit, please call [phone].

## 2018-03-13 ENCOUNTER — APPOINTMENT (OUTPATIENT)
Dept: CARDIAC REHAB | Age: 53
End: 2018-03-13

## 2018-03-14 ENCOUNTER — HOSPITAL ENCOUNTER (OUTPATIENT)
Dept: CARDIAC REHAB | Age: 53
Discharge: HOME | End: 2018-03-14
Payer: COMMERCIAL

## 2018-03-14 VITALS — BODY MASS INDEX: 31.12 KG/M2 | WEIGHT: 187 LBS

## 2018-03-14 PROCEDURE — 93798 PHYS/QHP OP CAR RHAB W/ECG: CPT

## 2018-03-20 ENCOUNTER — APPOINTMENT (OUTPATIENT)
Dept: CARDIAC REHAB | Age: 53
End: 2018-03-20

## 2018-03-21 ENCOUNTER — TELEPHONE (OUTPATIENT)
Dept: CARDIAC REHAB | Age: 53
End: 2018-03-21

## 2018-03-21 ENCOUNTER — APPOINTMENT (OUTPATIENT)
Dept: CARDIAC REHAB | Age: 53
End: 2018-03-21
Payer: COMMERCIAL

## 2018-03-21 NOTE — TELEPHONE ENCOUNTER
3/21/2018 Cardiac Wellness: Mr. Nae Menendez called to cancel today's appointment d/t weather. Will return for next appointment.  Letty Saleem

## 2018-03-28 ENCOUNTER — HOSPITAL ENCOUNTER (OUTPATIENT)
Dept: CARDIAC REHAB | Age: 53
Discharge: HOME | End: 2018-03-28
Payer: COMMERCIAL

## 2018-03-28 VITALS — WEIGHT: 185 LBS | BODY MASS INDEX: 30.79 KG/M2

## 2018-03-28 PROCEDURE — 93798 PHYS/QHP OP CAR RHAB W/ECG: CPT

## 2018-04-03 ENCOUNTER — APPOINTMENT (OUTPATIENT)
Dept: CARDIAC REHAB | Age: 53
End: 2018-04-03
Payer: MEDICAID

## 2018-04-03 ENCOUNTER — TELEPHONE (OUTPATIENT)
Dept: CARDIAC REHAB | Age: 53
End: 2018-04-03

## 2018-04-03 NOTE — TELEPHONE ENCOUNTER
4/3/2018 Cardiac Wellness: Mr. Jaz Lee called to cancel today's appointment d/t conflicting doctor appointments. Will return for next appointment.  Kathrin Morrison

## 2018-04-04 ENCOUNTER — APPOINTMENT (OUTPATIENT)
Dept: CARDIAC REHAB | Age: 53
End: 2018-04-04
Payer: MEDICAID

## 2018-04-04 ENCOUNTER — HOSPITAL ENCOUNTER (OUTPATIENT)
Dept: CARDIAC REHAB | Age: 53
Discharge: HOME | End: 2018-04-04
Payer: MEDICAID

## 2018-04-04 VITALS — WEIGHT: 186 LBS | BODY MASS INDEX: 30.95 KG/M2

## 2018-04-04 PROCEDURE — 93798 PHYS/QHP OP CAR RHAB W/ECG: CPT

## 2018-04-18 ENCOUNTER — HOSPITAL ENCOUNTER (OUTPATIENT)
Dept: CARDIAC REHAB | Age: 53
Discharge: HOME | End: 2018-04-18
Payer: MEDICAID

## 2018-04-18 VITALS — BODY MASS INDEX: 31.12 KG/M2 | WEIGHT: 187 LBS

## 2018-04-18 PROCEDURE — 93798 PHYS/QHP OP CAR RHAB W/ECG: CPT

## 2018-04-25 ENCOUNTER — HOSPITAL ENCOUNTER (OUTPATIENT)
Dept: CARDIAC REHAB | Age: 53
Discharge: HOME | End: 2018-04-25
Payer: MEDICAID

## 2018-04-25 VITALS — WEIGHT: 188 LBS | BODY MASS INDEX: 31.28 KG/M2

## 2018-04-25 PROCEDURE — 93797 PHYS/QHP OP CAR RHAB WO ECG: CPT | Performed by: DIETITIAN, REGISTERED

## 2018-04-25 PROCEDURE — 93798 PHYS/QHP OP CAR RHAB W/ECG: CPT

## 2018-05-02 ENCOUNTER — HOSPITAL ENCOUNTER (OUTPATIENT)
Dept: CARDIAC REHAB | Age: 53
Discharge: HOME | End: 2018-05-02
Payer: MEDICARE

## 2018-05-02 PROCEDURE — 93798 PHYS/QHP OP CAR RHAB W/ECG: CPT

## 2018-05-04 ENCOUNTER — HOSPITAL ENCOUNTER (OUTPATIENT)
Dept: CARDIAC REHAB | Age: 53
Discharge: HOME | End: 2018-05-04
Payer: MEDICARE

## 2018-05-04 VITALS — BODY MASS INDEX: 31.28 KG/M2 | WEIGHT: 188 LBS

## 2018-05-04 PROCEDURE — 93798 PHYS/QHP OP CAR RHAB W/ECG: CPT

## 2018-05-07 ENCOUNTER — HOSPITAL ENCOUNTER (OUTPATIENT)
Dept: CARDIAC REHAB | Age: 53
Discharge: HOME | End: 2018-05-07
Payer: MEDICARE

## 2018-05-07 VITALS — BODY MASS INDEX: 31.28 KG/M2 | WEIGHT: 188 LBS

## 2018-05-07 PROCEDURE — 93798 PHYS/QHP OP CAR RHAB W/ECG: CPT

## 2018-05-09 ENCOUNTER — HOSPITAL ENCOUNTER (OUTPATIENT)
Dept: CARDIAC REHAB | Age: 53
Discharge: HOME | End: 2018-05-09
Payer: MEDICARE

## 2018-05-09 ENCOUNTER — OFFICE VISIT (OUTPATIENT)
Dept: INTERNAL MEDICINE CLINIC | Age: 53
End: 2018-05-09

## 2018-05-09 VITALS
OXYGEN SATURATION: 93 % | RESPIRATION RATE: 16 BRPM | HEART RATE: 67 BPM | BODY MASS INDEX: 31.4 KG/M2 | TEMPERATURE: 98.1 F | WEIGHT: 188.5 LBS | DIASTOLIC BLOOD PRESSURE: 83 MMHG | HEIGHT: 65 IN | SYSTOLIC BLOOD PRESSURE: 122 MMHG

## 2018-05-09 VITALS — WEIGHT: 187 LBS | BODY MASS INDEX: 31.12 KG/M2

## 2018-05-09 DIAGNOSIS — R73.9 ELEVATED BLOOD SUGAR: ICD-10-CM

## 2018-05-09 DIAGNOSIS — M54.9 UPPER BACK PAIN: Primary | ICD-10-CM

## 2018-05-09 PROCEDURE — 93798 PHYS/QHP OP CAR RHAB W/ECG: CPT
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
## 2018-05-09 NOTE — MR AVS SNAPSHOT
Doyle Bee 
 
 
 [address] [id number] 
[phone] Patient: Radha Epstein MRN: [id number] :1965 Visit Information Date & Time Provider Department Dept. Phone Encounter #  
 2018  4:15 PM Dave Perez MD [facility] Primary [facility] [phone] [id number] Follow-up Instructions Return in about 4 weeks (around 2018), or if symptoms worsen or fail to improve. Follow-up and Disposition History Your Appointments 2018  9:15 AM  
Any with Dave Perez MD  
[address] [place] [address] and Primary Care [address] [place] [address]) Appt Note: 3 month follow up  
 [address] [facility]  
  
   
 [address] [id number] Upcoming Health Maintenance Date Due Influenza Age 5 to Adult 2018 FOBT Q 1 YEAR AGE 50-75 2018 DTaP/Tdap/Td series (2 - Td) 3/3/2026 Allergies as of 2018  Review Complete On: 2018 By: Jennifer Al No Known Allergies Current Immunizations  Reviewed on 2017 Name Date Influenza Vaccine 2012 Tdap 3/3/2016 Not reviewed this visit You Were Diagnosed With   
  
 Codes Comments Upper back pain    -  Primary ICD-10-CM: M54.9 ICD-9-CM: 724.5 Elevated blood sugar     ICD-10-CM: R73.9 ICD-9-CM: 790.29 Vitals BP Pulse Temp Resp Height(growth percentile) Weight(growth percentile) 122/83 (BP 1 Location: Right arm, BP Patient Position: Sitting) 67 98.1 °F (36.7 °C) (Oral) 16 5' 5\" (1.651 m) 188 lb 8 oz (85.5 kg) SpO2 BMI Smoking Status 93% 31.37 kg/m2 Never Smoker Vitals History BMI and BSA Data Body Mass Index Body Surface Area  
 31.37 kg/m 2 1.98 m 2 Preferred Pharmacy Pharmacy Name Phone [facility], [address] RUDD Your Updated Medication List  
  
   
 This list is accurate as of 5/9/18  4:41 PM.  Always use your most recent med list.  
  
  
  
  
 ARIPiprazole 2 mg tablet Commonly known as:  ABILIFY Take  by mouth daily. aspirin 81 mg chewable tablet Take 1 Tab by mouth daily. clopidogrel 75 mg Tab Commonly known as:  PLAVIX TAKE 1 TABLET BY ORAL ROUTE EVERY DAY  
  
 gabapentin 600 mg tablet Commonly known as:  NEURONTIN  
600 mg in AM, 600 mg at noon, 900 mg at night  
  
 metoprolol tartrate 25 mg tablet Commonly known as:  LOPRESSOR Take 1 Tab by mouth every twelve (12) hours. rosuvastatin 10 mg tablet Commonly known as:  CRESTOR Take 1 Tab by mouth nightly. ticagrelor 90 mg tablet Commonly known as:  [employer] Take 1 Tab by mouth every twelve (12) hours every twelve (12) hours. traZODone 50 mg tablet Commonly known as:  Sarita Ifeoma Take 25 mg by mouth nightly. ZOLOFT 100 mg tablet Generic drug:  sertraline Take 200 mg by mouth daily. We Performed the Following HEMOGLOBIN A1C WITH EAG [41315 CPT(R)] REFERRAL TO PHYSICAL THERAPY [DGT13 Custom] Follow-up Instructions Return in about 4 weeks (around 6/6/2018), or if symptoms worsen or fail to improve. To-Do List   
 05/14/2018 9:30 AM  
  Appointment with [address] at [address] [place] [address] ([phone])  
  
 05/16/2018 9:30 AM  
  Appointment with [address] at [address] [place] [address] ([phone])  
  
 05/21/2018 9:30 AM  
  Appointment with [address] at [address] [place] [address] ([phone])  
  
 05/23/2018 9:30 AM  
  Appointment with [address] at [address] [place] [address] ([phone])  
  
 05/30/2018 9:30 AM  
  Appointment with [address] at [address] [place] [address] ([phone]) Referral Information  Referral ID Referred By Referred To  
  
 [id number] Simone Graf [facility] [place] [facility] PT PHILLIP Ayers   
 Coco Newman, [address] Phone: [phone] Fax: [phone] Visits Status Start Date End Date  
 20 New Request 5/9/18 5/9/19 If your referral has a status of pending review or denied, additional information will be sent to support the outcome of this decision. Introducing [facility]! Dear Luke Lucero: 
Thank you for requesting a PlayPhone account. Our records indicate that you already have an active PlayPhone account. You can access your account anytime at [email or web address] Did you know that you can access your hospital and ER discharge instructions at any time in PlayPhone? You can also review all of your test results from your hospital stay or ER visit. Additional Information If you have questions, please visit the Frequently Asked Questions section of the PlayPhone website at [email or web address] Remember, PlayPhone is NOT to be used for urgent needs. For medical emergencies, dial 911. Now available from your iPhone and Android! Please provide this summary of care documentation to your next provider. Your primary care clinician is listed as Rose Montenegro. If you have any questions after today's visit, please call [phone].

## 2018-05-09 NOTE — ASSESSMENT & PLAN NOTE
Stable, based on history, physical exam and review of pertinent labs, studies and medications; meds reconciled; continue current treatment plan. Key Psychotherapeutic Meds             ARIPiprazole (ABILIFY) 2 mg tablet Take  by mouth daily. traZODone (DESYREL) 50 mg tablet Take 25 mg by mouth nightly. sertraline (ZOLOFT) 100 mg tablet Take 200 mg by mouth daily.         Other [address] [facility]             gabapentin (NEURONTIN) 600 mg tablet 600 mg in AM, 600 mg at noon, 900 mg at night        Lab Results   Component Value Date/Time    Sodium 145 12/21/2017 09:21 AM    Creatinine 1.00 12/21/2017 09:21 AM    WBC 7.9 10/11/2017 02:31 AM    ALT (SGPT) 36 12/21/2017 09:21 AM    AST (SGOT) 33 12/21/2017 09:21 AM

## 2018-05-09 NOTE — ASSESSMENT & PLAN NOTE
Stable, based on history, physical exam and review of pertinent labs, studies and medications; meds reconciled; continue current treatment plan. Key CAD CHF Meds             clopidogrel (PLAVIX) 75 mg tab TAKE 1 TABLET BY ORAL ROUTE EVERY DAY    rosuvastatin (CRESTOR) 10 mg tablet Take 1 Tab by mouth nightly. aspirin 81 mg chewable tablet Take 1 Tab by mouth daily. ticagrelor (BRILINTA) 90 mg tablet Take 1 Tab by mouth every twelve (12) hours every twelve (12) hours. metoprolol tartrate (LOPRESSOR) 25 mg tablet Take 1 Tab by mouth every twelve (12) hours.         [id number](BNP,BNPP,BNPPPOC,HSCRP,NA,NAPOC,K,KPOCT,CHOL,CHOLPOCT,HDL,HDLPOC,LDLCHOL,LDLPOCT,LDLCPOC,LDLC,LDL,LDLCEXT,TRIGL,TGLPOCT,INR,INREXT,INRPOC,PTP,PTINR,PTEXT,DIG)@

## 2018-05-10 LAB
EST. AVERAGE GLUCOSE BLD GHB EST-MCNC: 123 MG/DL
HBA1C MFR BLD: 5.9 % (ref 4.8–5.6)

## 2018-05-14 ENCOUNTER — HOSPITAL ENCOUNTER (OUTPATIENT)
Dept: CARDIAC REHAB | Age: 53
Discharge: HOME | End: 2018-05-14
Payer: MEDICARE

## 2018-05-14 VITALS — WEIGHT: 188 LBS | BODY MASS INDEX: 31.28 KG/M2

## 2018-05-14 PROCEDURE — 93798 PHYS/QHP OP CAR RHAB W/ECG: CPT

## 2018-05-18 ENCOUNTER — HOSPITAL ENCOUNTER (OUTPATIENT)
Dept: CARDIAC REHAB | Age: 53
Discharge: HOME | End: 2018-05-18
Payer: MEDICARE

## 2018-05-18 ENCOUNTER — OFFICE VISIT (OUTPATIENT)
Dept: INTERNAL MEDICINE CLINIC | Age: 53
End: 2018-05-18

## 2018-05-18 VITALS
SYSTOLIC BLOOD PRESSURE: 131 MMHG | HEART RATE: 56 BPM | OXYGEN SATURATION: 91 % | DIASTOLIC BLOOD PRESSURE: 87 MMHG | TEMPERATURE: 97.7 F | WEIGHT: 189.7 LBS | RESPIRATION RATE: 16 BRPM | BODY MASS INDEX: 31.61 KG/M2 | HEIGHT: 65 IN

## 2018-05-18 VITALS — WEIGHT: 189 LBS | BODY MASS INDEX: 31.45 KG/M2

## 2018-05-18 DIAGNOSIS — I21.11 ST ELEVATION (STEMI) MYOCARDIAL INFARCTION INVOLVING RIGHT CORONARY ARTERY (HCC): ICD-10-CM

## 2018-05-18 DIAGNOSIS — F33.9 RECURRENT DEPRESSION (HCC): ICD-10-CM

## 2018-05-18 DIAGNOSIS — E78.00 HYPERCHOLESTEREMIA: Primary | ICD-10-CM

## 2018-05-18 PROCEDURE — 93798 PHYS/QHP OP CAR RHAB W/ECG: CPT

## 2018-05-18 NOTE — ASSESSMENT & PLAN NOTE
Stable, based on history, physical exam and review of pertinent labs, studies and medications; meds reconciled; continue current treatment plan. Key Psychotherapeutic Meds             ARIPiprazole (ABILIFY) 2 mg tablet  (Taking) Take  by mouth daily. traZODone (DESYREL) 50 mg tablet  (Taking) Take 25 mg by mouth nightly. sertraline (ZOLOFT) 100 mg tablet  (Taking) Take 200 mg by mouth daily.         Other [address] [facility]             gabapentin (NEURONTIN) 600 mg tablet  (Taking) 600 mg in AM, 600 mg at noon, 900 mg at night        Lab Results   Component Value Date/Time    Sodium 145 12/21/2017 09:21 AM    Creatinine 1.00 12/21/2017 09:21 AM    WBC 7.9 10/11/2017 02:31 AM    ALT (SGPT) 36 12/21/2017 09:21 AM    AST (SGOT) 33 12/21/2017 09:21 AM

## 2018-05-18 NOTE — PROGRESS NOTES
Chief Complaint   Patient presents with    Cholesterol Problem     he is a 45y.o. year old male who presents for follow-up of   hyperlipidemia. Cardiovascular risk analysis - hyperlipidemia  sedentary lifestyle. Compliance with treatment thus far has been fair. New concerns: Doing well no concerns. Social History, Diet and Lifestyle: generally follows a low fat low cholesterol diet, exercises sporadically, nonsmoker      Lab Results  Component Value Date/Time   Cholesterol, total 125 02/16/2018 09:50 AM   HDL Cholesterol 51 02/16/2018 09:50 AM   LDL, calculated 57 02/16/2018 09:50 AM   Triglyceride 86 02/16/2018 09:50 AM        ROS: taking medications as instructed, no medication side effects noted, no TIA's, no chest pain on exertion, no dyspnea on exertion, no swelling of ankles. Reviewed and agree with Nurse Note and duplicated in this note. Reviewed PmHx, RxHx, FmHx, SocHx, AllgHx and updated and dated in the chart.     Family History   Problem Relation Age of Onset    Heart Disease Mother     Stroke Mother        Past Medical History:   Diagnosis Date    Anxiety disorder     CAD (coronary artery disease) 10/10/2017    STEMI and stents    Depression     Diarrhea     Falls     Headache     Joint pain     Joint pain     Memory disorder     Memory loss     Muscle pain     Muscle pain     Muscle weakness     Muscle weakness     Torn rotator cuff     right side    Vertigo     Visual disturbance     Visual disturbance       Social History     Social History    Marital status:      Spouse name: N/A    Number of children: N/A    Years of education: N/A     Social History Main Topics    Smoking status: Never Smoker    Smokeless tobacco: Never Used    Alcohol use No    Drug use: No    Sexual activity: Yes     Other Topics Concern    Not on file     Social History Narrative      Patient Active Problem List    Diagnosis Date Noted    Recurrent depression ([facility] [address] 12/21/2017  Chest pain 10/10/2017    ST elevation (STEMI) myocardial infarction involving right coronary artery ([facility] [address] 10/10/2017    Anxiety 12/13/2016    Intractable chronic post-traumatic headache 12/13/2016    ADD (attention deficit disorder) 12/13/2016    Hypovitaminosis D 03/07/2016    Hypercholesteremia 03/07/2016     Current Outpatient Prescriptions   Medication Sig Dispense Refill    gabapentin (NEURONTIN) 600 mg tablet 600 mg in AM, 600 mg at noon, 900 mg at night 105 Tab 5    clopidogrel (PLAVIX) 75 mg tab TAKE 1 TABLET BY ORAL ROUTE EVERY DAY  5    ARIPiprazole (ABILIFY) 2 mg tablet Take  by mouth daily.  rosuvastatin (CRESTOR) 10 mg tablet Take 1 Tab by mouth nightly. 30 Tab 6    aspirin 81 mg chewable tablet Take 1 Tab by mouth daily. 30 Tab 6    ticagrelor (BRILINTA) 90 mg tablet Take 1 Tab by mouth every twelve (12) hours every twelve (12) hours. 60 Tab 6    metoprolol tartrate (LOPRESSOR) 25 mg tablet Take 1 Tab by mouth every twelve (12) hours. 60 Tab 6    traZODone (DESYREL) 50 mg tablet Take 25 mg by mouth nightly.  sertraline (ZOLOFT) 100 mg tablet Take 200 mg by mouth daily.        No Known Allergies  Past Medical History:   Diagnosis Date    Anxiety disorder     CAD (coronary artery disease) 10/10/2017    STEMI and stents    Depression     Diarrhea     Falls     Headache     Joint pain     Joint pain     Memory disorder     Memory loss     Muscle pain     Muscle pain     Muscle weakness     Muscle weakness     Torn rotator cuff     right side    Vertigo     Visual disturbance     Visual disturbance      Past Surgical History:   Procedure Laterality Date    HX ORTHOPAEDIC  02/2017    rotator cuff repair    HX OTHER SURGICAL  2016    cervical radiculopathy           Objective:     Vitals:    05/18/18 0934   Weight: 189 lb 11.2 oz (86 kg)   Height: 5' 5\" (1.651 m)       Physical Examination: General appearance - alert, well appearing, and in no distress  Eyes - pupils equal and reactive, extraocular eye movements intact  Ears - bilateral TM's and external ear canals normal  Nose - normal and patent, no erythema, discharge or polyps  Mouth - mucous membranes moist, pharynx normal without lesions  Neck - supple, no significant adenopathy  Chest - clear to auscultation, no wheezes, rales or rhonchi, symmetric air entry  Heart - normal rate, regular rhythm, normal S1, S2, no murmurs, rubs, clicks or gallops  Abdomen - soft, nontender, nondistended, no masses or organomegaly  Extremities - peripheral pulses normal, no pedal edema, no clubbing or cyanosis  Skin - normal coloration and turgor, no rashes, no suspicious skin lesions noted    Assessment/ Plan:     Diagnoses and all orders for this visit:    1. Hypercholesteremia  -     LIPID PANEL  -     METABOLIC PANEL, COMPREHENSIVE    2. ST elevation (STEMI) myocardial infarction involving right coronary artery [facility])  Assessment & Plan: This condition is managed by Specialist.  Key CAD CHF Meds             clopidogrel (PLAVIX) 75 mg tab  (Taking) TAKE 1 TABLET BY ORAL ROUTE EVERY DAY    rosuvastatin (CRESTOR) 10 mg tablet  (Taking) Take 1 Tab by mouth nightly. aspirin 81 mg chewable tablet  (Taking) Take 1 Tab by mouth daily. ticagrelor (BRILINTA) 90 mg tablet  (Taking) Take 1 Tab by mouth every twelve (12) hours every twelve (12) hours. metoprolol tartrate (LOPRESSOR) 25 mg tablet  (Taking) Take 1 Tab by mouth every twelve (12) hours. [id number](BNP,BNPP,BNPPPOC,HSCRP,NA,NAPOC,K,KPOCT,CHOL,CHOLPOCT,HDL,HDLPOC,LDLCHOL,LDLPOCT,LDLCPOC,LDLC,LDL,LDLCEXT,TRIGL,TGLPOCT,INR,INREXT,INRPOC,PTP,PTINR,PTEXT,DIG)@      3. Recurrent depression ([facility] [address]  Assessment & Plan:  Stable, based on history, physical exam and review of pertinent labs, studies and medications; meds reconciled; continue current treatment plan. Key Psychotherapeutic Meds             ARIPiprazole (ABILIFY) 2 mg tablet  (Taking) Take  by mouth daily. traZODone (DESYREL) 50 mg tablet  (Taking) Take 25 mg by mouth nightly. sertraline (ZOLOFT) 100 mg tablet  (Taking) Take 200 mg by mouth daily. Other [address] [facility] [address]             gabapentin (NEURONTIN) 600 mg tablet  (Taking) 600 mg in AM, 600 mg at noon, 900 mg at night        Lab Results   Component Value Date/Time    Sodium 145 12/21/2017 09:21 AM    Creatinine 1.00 12/21/2017 09:21 AM    WBC 7.9 10/11/2017 02:31 AM    ALT (SGPT) 36 12/21/2017 09:21 AM    AST (SGOT) 33 12/21/2017 09:21 AM      Follow-up Disposition: Not on File    I have discussed the diagnosis with the patient and the intended plan as seen in the above orders. The patient has received an after-visit summary and questions were answered concerning future plans. Medication Side Effects and Warnings were discussed with patient: yes  Patient Labs were reviewed and or requested: yes  Patient Past Records were reviewed and or requested  yes  I have discussed the diagnosis with the patient and the intended plan as seen in the above orders. The patient has received an after-visit summary and questions were answered concerning future plans. Pt agrees to call or return to clinic and/or go to closest ER with any worsening of symptoms. This may include, but not limited to increased fever (>100.4) with NSAIDS or Tylenol, increased edema, confusion, rash, worsening of presenting symptoms. 1) Remember to stay active and/or exercise regularly (I suggest 30-45 minutes daily)   2) For reliable dietary information, go to www. EATRIGHT.org. You may wish to consider seeing the nutritionist at [facility] at #[phone] or [phone], also consider the [facility]. 3) I routinely suggest a complete physical exam once each year (your birth month)      1. Have you been to the ER, urgent care clinic since your last visit? Hospitalized since your last visit? No    2.  Have you seen or consulted any other health care providers outside of the [facility] since your last visit? Include any pap smears or colon screening.  No

## 2018-05-18 NOTE — ASSESSMENT & PLAN NOTE
This condition is managed by Specialist.  Key CAD CHF Meds             clopidogrel (PLAVIX) 75 mg tab  (Taking) TAKE 1 TABLET BY ORAL ROUTE EVERY DAY    rosuvastatin (CRESTOR) 10 mg tablet  (Taking) Take 1 Tab by mouth nightly. aspirin 81 mg chewable tablet  (Taking) Take 1 Tab by mouth daily. ticagrelor (BRILINTA) 90 mg tablet  (Taking) Take 1 Tab by mouth every twelve (12) hours every twelve (12) hours. metoprolol tartrate (LOPRESSOR) 25 mg tablet  (Taking) Take 1 Tab by mouth every twelve (12) hours.         [id number](BNP,BNPP,BNPPPOC,HSCRP,NA,NAPOC,K,KPOCT,CHOL,CHOLPOCT,HDL,HDLPOC,LDLCHOL,LDLPOCT,LDLCPOC,LDLC,LDL,LDLCEXT,TRIGL,TGLPOCT,INR,INREXT,INRPOC,PTP,PTINR,PTEXT,DIG)@

## 2018-05-18 NOTE — MR AVS SNAPSHOT
[address] [place] [address] 
 
 
 Kathy Amato [address] [id number] 
[phone] Patient: Magdaleno Fraire MRN: [id number] :1965 Visit Information Date & Time Provider Department Dept. Phone Encounter #  
 2018  9:15 AM [facility] MD Siobhan [facility] Medicine and Primary [facility] [phone] [id number] Follow-up Instructions Return in about 3 months (around 2018) for Diabetes Check, Cholesterol. Follow-up and Disposition History Your Appointments 2018 11:15 AM  
Any with [facility] MD Siobhan  
[address] [place] [address] and Primary Care [facility]) Appt Note: Kaleigh 605 FOLLOW UP  
 Kathy Amato 90 1 [facility]  
  
   
 Kathy Amato 90 [id number] Upcoming Health Maintenance Date Due Influenza Age 5 to Adult 2018 FOBT Q 1 YEAR AGE 50-75 2018 DTaP/Tdap/Td series (2 - Td) 3/3/2026 Allergies as of 2018  Review Complete On: 2018 By: Nathanael  No Known Allergies Current Immunizations  Reviewed on 2017 Name Date Influenza Vaccine 2012 Tdap 3/3/2016 Not reviewed this visit You Were Diagnosed With   
  
 Codes Comments Hypercholesteremia    -  Primary ICD-10-CM: E78.00 ICD-9-CM: 272.0 ST elevation (STEMI) myocardial infarction involving right coronary artery ([facility]     ICD-10-CM: I21.11 ICD-9-CM: 410.31 Recurrent depression ([facility]     ICD-10-CM: F33.9 ICD-9-CM: 296.30 Vitals BP Pulse Temp Resp Height(growth percentile) Weight(growth percentile) 131/87 (BP 1 Location: Right arm, BP Patient Position: Sitting) (!) 56 97.7 °F (36.5 °C) (Oral) 16 5' 5\" (1.651 m) 189 lb 11.2 oz (86 kg) SpO2 BMI Smoking Status 91% 31.57 kg/m2 Never Smoker Vitals History BMI and BSA Data Body Mass Index Body Surface Area  
 31.57 kg/m 2 1.99 m 2 Preferred Pharmacy Pharmacy Name Phone [facility] [place], [address] RUDD Your Updated Medication List  
  
   
This list is accurate as of 5/18/18  9:56 AM.  Always use your most recent med list.  
  
  
  
  
 ARIPiprazole 2 mg tablet Commonly known as:  ABILIFY Take  by mouth daily. aspirin 81 mg chewable tablet Take 1 Tab by mouth daily. clopidogrel 75 mg Tab Commonly known as:  PLAVIX TAKE 1 TABLET BY ORAL ROUTE EVERY DAY  
  
 gabapentin 600 mg tablet Commonly known as:  NEURONTIN  
600 mg in AM, 600 mg at noon, 900 mg at night  
  
 metoprolol tartrate 25 mg tablet Commonly known as:  LOPRESSOR Take 1 Tab by mouth every twelve (12) hours. rosuvastatin 10 mg tablet Commonly known as:  CRESTOR Take 1 Tab by mouth nightly. ticagrelor 90 mg tablet Commonly known as:  [employer] Take 1 Tab by mouth every twelve (12) hours every twelve (12) hours. traZODone 50 mg tablet Commonly known as:  Manisha Hand Take 25 mg by mouth nightly. ZOLOFT 100 mg tablet Generic drug:  sertraline Take 200 mg by mouth daily. We Performed the Following LIPID PANEL [36358 CPT(R)] METABOLIC PANEL, COMPREHENSIVE [42214 CPT(R)] Follow-up Instructions Return in about 3 months (around 8/18/2018) for Diabetes Check, Cholesterol. To-Do List   
 05/21/2018 9:30 AM  
  Appointment with [address] at [address] [place] [address] ([phone])  
  
 05/23/2018 9:30 AM  
  Appointment with [address] at [address] [place] [address] ([phone])  
  
 05/30/2018 9:30 AM  
  Appointment with [address] at [address] [place] [address] ([phone])  
  
 05/30/2018 2:30 PM  
  Appointment with Netty Severe at [facility] [place] OP Kathy Escalera ([phone]) Introducing hospitals & HEALTH SERVICES! Dear Gio Marquez: 
Thank you for requesting a MyChart account.   Our records indicate that you already have an active [employer] account. You can access your account anytime at [email or web address] Did you know that you can access your hospital and ER discharge instructions at any time in All[employer]? You can also review all of your test results from your hospital stay or ER visit. Additional Information If you have questions, please visit the Frequently Asked Questions section of the All[employer] website at [email or web address] Remember, All[employer]ices is NOT to be used for urgent needs. For medical emergencies, dial 911. Now available from your iPhone and Android! Please provide this summary of care documentation to your next provider. Your primary care clinician is listed as [address] [place] [address] . If you have any questions after today's visit, please call [phone].

## 2018-05-19 LAB
ALBUMIN SERPL-MCNC: 4.3 G/DL (ref 3.5–5.5)
ALBUMIN/GLOB SERPL: 1.8 {RATIO} (ref 1.2–2.2)
ALP SERPL-CCNC: 71 IU/L (ref 39–117)
ALT SERPL-CCNC: 45 IU/L (ref 0–44)
AST SERPL-CCNC: 32 IU/L (ref 0–40)
BILIRUB SERPL-MCNC: 1 MG/DL (ref 0–1.2)
BUN SERPL-MCNC: 13 MG/DL (ref 6–24)
BUN/CREAT SERPL: 13 (ref 9–20)
CALCIUM SERPL-MCNC: 9.3 MG/DL (ref 8.7–10.2)
CHLORIDE SERPL-SCNC: 102 MMOL/L (ref 96–106)
CHOLEST SERPL-MCNC: 200 MG/DL (ref 100–199)
CO2 SERPL-SCNC: 26 MMOL/L (ref 18–29)
CREAT SERPL-MCNC: 0.98 MG/DL (ref 0.76–1.27)
GFR SERPLBLD CREATININE-BSD FMLA CKD-EPI: 101 ML/MIN/1.73
GFR SERPLBLD CREATININE-BSD FMLA CKD-EPI: 87 ML/MIN/1.73
GLOBULIN SER CALC-MCNC: 2.4 G/DL (ref 1.5–4.5)
GLUCOSE SERPL-MCNC: 98 MG/DL (ref 65–99)
HDLC SERPL-MCNC: 64 MG/DL
LDLC SERPL CALC-MCNC: 100 MG/DL (ref 0–99)
POTASSIUM SERPL-SCNC: 4.1 MMOL/L (ref 3.5–5.2)
PROT SERPL-MCNC: 6.7 G/DL (ref 6–8.5)
SODIUM SERPL-SCNC: 142 MMOL/L (ref 134–144)
TRIGL SERPL-MCNC: 182 MG/DL (ref 0–149)
VLDLC SERPL CALC-MCNC: 36 MG/DL (ref 5–40)

## 2018-05-21 ENCOUNTER — HOSPITAL ENCOUNTER (OUTPATIENT)
Dept: CARDIAC REHAB | Age: 53
Discharge: HOME | End: 2018-05-21
Payer: MEDICARE

## 2018-05-21 VITALS — BODY MASS INDEX: 31.45 KG/M2 | WEIGHT: 189 LBS

## 2018-05-21 PROCEDURE — 93798 PHYS/QHP OP CAR RHAB W/ECG: CPT

## 2018-05-21 RX ORDER — ROSUVASTATIN CALCIUM 20 MG/1
20 TABLET, COATED ORAL
Qty: 30 TAB | Refills: 6 | Status: SHIPPED | OUTPATIENT
Start: 2018-05-21 | End: 2019-02-28 | Stop reason: SDUPTHER

## 2018-05-21 RX ORDER — ROSUVASTATIN CALCIUM 20 MG/1
20 TABLET, COATED ORAL
Qty: 30 TAB | Refills: 6 | Status: SHIPPED | OUTPATIENT
Start: 2018-05-21 | End: 2018-05-21 | Stop reason: SDUPTHER

## 2018-05-23 ENCOUNTER — HOSPITAL ENCOUNTER (OUTPATIENT)
Dept: CARDIAC REHAB | Age: 53
Discharge: HOME | End: 2018-05-23
Payer: MEDICARE

## 2018-05-23 VITALS — WEIGHT: 187 LBS | BODY MASS INDEX: 31.12 KG/M2

## 2018-05-23 PROCEDURE — 93798 PHYS/QHP OP CAR RHAB W/ECG: CPT

## 2018-05-30 ENCOUNTER — TELEPHONE (OUTPATIENT)
Dept: CARDIAC REHAB | Age: 53
End: 2018-05-30

## 2018-05-30 ENCOUNTER — APPOINTMENT (OUTPATIENT)
Dept: CARDIAC REHAB | Age: 53
End: 2018-05-30
Payer: MEDICARE

## 2018-05-30 ENCOUNTER — APPOINTMENT (OUTPATIENT)
Dept: PHYSICAL THERAPY | Age: 53
End: 2018-05-30

## 2018-05-30 NOTE — TELEPHONE ENCOUNTER
5/30/2018 [facility]ness: Mr. Saleem Nguyen called to cancel today's appointment d/t illness. Will return for next appointment.  Jess Has

## 2018-06-01 VITALS — WEIGHT: 188 LBS | BODY MASS INDEX: 31.28 KG/M2

## 2018-06-04 ENCOUNTER — HOSPITAL ENCOUNTER (OUTPATIENT)
Dept: CARDIAC REHAB | Age: 53
Discharge: HOME | End: 2018-06-04
Payer: MEDICARE

## 2018-06-04 VITALS — BODY MASS INDEX: 31.28 KG/M2 | WEIGHT: 188 LBS

## 2018-06-04 PROCEDURE — 93798 PHYS/QHP OP CAR RHAB W/ECG: CPT

## 2018-06-06 ENCOUNTER — HOSPITAL ENCOUNTER (OUTPATIENT)
Dept: CARDIAC REHAB | Age: 53
Discharge: HOME | End: 2018-06-06
Payer: MEDICARE

## 2018-06-06 VITALS — WEIGHT: 189 LBS | BODY MASS INDEX: 31.45 KG/M2

## 2018-06-06 PROCEDURE — 93798 PHYS/QHP OP CAR RHAB W/ECG: CPT

## 2018-06-08 ENCOUNTER — HOSPITAL ENCOUNTER (OUTPATIENT)
Dept: PHYSICAL THERAPY | Age: 53
Discharge: HOME | End: 2018-06-08
Payer: MEDICARE

## 2018-06-08 PROCEDURE — G8978 MOBILITY CURRENT STATUS: HCPCS

## 2018-06-08 PROCEDURE — G8979 MOBILITY GOAL STATUS: HCPCS

## 2018-06-08 PROCEDURE — 97110 THERAPEUTIC EXERCISES: CPT

## 2018-06-08 PROCEDURE — 97161 PT EVAL LOW COMPLEX 20 MIN: CPT

## 2018-06-08 NOTE — PROGRESS NOTES
[address] [facility] Physical Therapy  [address] [place] [address] Ave (MOB IV), [address] [facility] Lukas Lundberg  Phone: [phone] Fax: [phone]     Plan of Care/Statement of Necessity for Physical Therapy Services  2-15    Patient name: Malissa Cherry  : 1965  Provider#: [id number]  Referral source: Erinn Pittman MD      Medical/Treatment Diagnosis: Upper back pain [M54.9]     Prior Hospitalization: see medical history     Comorbidities: Back pain; BMI>30; Depression; Headaches; Heart attack; Incontinence  Prior Level of Function: Pt completed 20 minutes of exercise seldom or never. Medications: Verified on Patient Summary List  Start of Care: 2018      Onset Date: 2018   The Plan of Care and following information is based on the information from the initial evaluation. Assessment/ key information: Pt was referred to skilled PT for neck and upper back pain. Based on examination, patient presents with various impairments including R-sided neck and thoracic pain, significantly decreased thoracic spine mobility, poor postural awareness, significant turgor and tenderness in R upper trap and cervical paraspinal musculature, decreased cervical ROM (EXT/L rotation/L SB), decreased thoracic ROM (EXT/L Rotation), and decreased functional activity tolerance. The pt would benefit from skilled PT to address the improve his thoracic and cervical mobility, increase scapular strength, and decrease the turgor in his cervical and thoracic musculature.     Evaluation Complexity History MEDIUM  Complexity : 1-2 comorbidities / personal factors will impact the outcome/ POC ; Examination MEDIUM Complexity : 3 Standardized tests and measures addressing body structure, function, activity limitation and / or participation in recreation  ;Presentation LOW Complexity : Stable, uncomplicated   Overall Complexity Rating: LOW     Problem List: pain affecting function, decrease ROM, decrease strength, decrease ADL/ functional abilitiies, decrease activity tolerance and decrease flexibility/ joint mobility   Treatment Plan may include any combination of the following: Therapeutic exercise, Therapeutic activities, Neuromuscular re-education, Physical agent/modality, Manual therapy, Patient education and Functional mobility training  Patient / Family readiness to learn indicated by: asking questions, trying to perform skills and interest  Persons(s) to be included in education: patient (P)  Barriers to Learning/Limitations: None  Patient Goal (s): To become pain/stress and depression free  Patient Self Reported Health Status: fair  Rehabilitation Potential: good    Short Term Goals: To be accomplished in 6 treatments:   1. Pt will be independent and compliant with HEP. 2. Pt will demonstrate improved postural awareness in sitting and standing. Long Term Goals: To be accomplished in 12 treatments:   1. Pt will be independent and compliant with HEP. 2. Pt will improve his FOTO score by the MCID from 50/100 to 62/100 demonstrating improved overall function with decreased pain. 3. Pt will be able to return to work in some capacity in order to provide for his family secondary to decreased pain. 4. Pt will improve cervical ROM to normal limits without any complaints of pain or discomfort in order to improve his ability to perform functional tasks. 5. Pt will be able to play basketball without pain greater than 2/10 in order to return to hobbies. Frequency / Duration: Patient to be seen 1 times per week for 12 treatments. Patient/ Caregiver education and instruction: self care, activity modification and exercises    [x]  Plan of care has been reviewed with XAVIER    G-Codes (GP)  Mobility   Current  CK= 40-59%   Goal  CJ= 20-39%    The severity rating is based on clinical judgment and the FOTO Score score.     Certification Period: 6/8/2018 - 9/8/2018  Sol Figueroa 6/8/2018 9:54 AM    ________________________________________________________________________    I certify that the above Therapy Services are being furnished while the patient is under my care. I agree with the treatment plan and certify that this therapy is necessary.     [de-identified] Signature:____________________  Date:____________Time: _________
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
## 2018-06-11 ENCOUNTER — HOSPITAL ENCOUNTER (OUTPATIENT)
Dept: CARDIAC REHAB | Age: 53
Discharge: HOME | End: 2018-06-11
Payer: MEDICARE

## 2018-06-11 VITALS — BODY MASS INDEX: 31.28 KG/M2 | WEIGHT: 188 LBS

## 2018-06-11 PROCEDURE — 93798 PHYS/QHP OP CAR RHAB W/ECG: CPT

## 2018-06-15 ENCOUNTER — HOSPITAL ENCOUNTER (OUTPATIENT)
Dept: PHYSICAL THERAPY | Age: 53
Discharge: HOME | End: 2018-06-15
Payer: MEDICARE

## 2018-06-15 PROCEDURE — 97110 THERAPEUTIC EXERCISES: CPT

## 2018-06-15 PROCEDURE — 97140 MANUAL THERAPY 1/> REGIONS: CPT

## 2018-06-15 NOTE — PROGRESS NOTES
PT DAILY TREATMENT NOTE - [facility] 2-15    Patient Name: Magdaleno Fraire  Date:6/15/2018  : 1965  [x]  Patient  Verified  Payor: Alisa Guajardo / Plan: [place] MEDICARE PART A & B / Product Type: Medicare /    In time:8:00A  Out time:9:10A  Total Treatment Time (min): 70  Total Timed Codes (min): 70  1:1 Treatment Time ([facility] only): 40   Visit #: 2     Treatment Area: Upper back pain [M54.9]    SUBJECTIVE  Pain Level (0-10 scale): 0/10  Any medication changes, allergies to medications, adverse drug reactions, diagnosis change, or new procedure performed?: [x] No    [] Yes (see summary sheet for update)  Subjective functional status/changes:   [] No changes reported  Pt reports doing well. No pain.      OBJECTIVE    Modality rationale: decrease pain and increase tissue extensibility to improve the patients ability to decrease post therapy soreness   Min Type Additional Details    [] Estim: []Att   []Unatt        []TENS instruct                  []IFC  []Premod   []NMES                     []Other:  []w/US   []w/ice   []w/heat  Position:  Location:    []  Traction: [] Cervical       []Lumbar                       [] Prone          []Supine                       []Intermittent   []Continuous Lbs:  [] before manual  [] after manual  []w/heat    []  Ultrasound: []Continuous   [] Pulsed at:                           []1MHz   []3MHz Location:  W/cm2:    [] Paraffin         Location:   []w/heat   -- []  Ice     []  Heat  []  Ice massage Position:  Location:    []  Laser  []  Other: Position:  Location:      []  Vasopneumatic Device Pressure:       [] lo [] med [] hi   Temperature:      [x] Skin assessment post-treatment:  [x]intact []redness- no adverse reaction    []redness  adverse reaction:     55 min Therapeutic Exercise:  [x] See flow sheet :   Rationale: increase ROM, increase strength and improve coordination to improve the patients ability to increase function and mobility     15 min Manual Therapy: seated thoracic extension mob, A-P mobs thoracic spine. STM/MFR thoracic paraspinals, MET to correct ERSR at T7    Rationale: decrease pain, increase ROM, increase tissue extensibility and decrease trigger points to improve the patients ability to increase thoracic mobility          With   [] TE   [] TA   [] neuro   [] other: Patient Education: [x] Review HEP    [] Progressed/Changed HEP based on:   [] positioning   [] body mechanics   [] transfers   [] heat/ice application    [] other:      Other Objective/Functional Measures: --     Pain Level (0-10 scale) post treatment: 0/10    ASSESSMENT/Changes in Function:   Pt demonstrated increase seated trunk rotation following MET today. Tolerated added mobility and strengthening exercises well. Does demonstrate shoulder shrug compensation with tband extension. Required verbal and tactile cues. Patient will continue to benefit from skilled PT services to modify and progress therapeutic interventions, address functional mobility deficits, address ROM deficits, address strength deficits, analyze and address soft tissue restrictions, analyze and cue movement patterns, analyze and modify body mechanics/ergonomics and assess and modify postural abnormalities to attain remaining goals. []  See Plan of Care  []  See progress note/recertification  []  See Discharge Summary         Progress towards goals / Updated goals:  Short Term Goals: To be accomplished in 6 treatments:  1. Pt will be independent and compliant with HEP. 2. Pt will demonstrate improved postural awareness in sitting and standing.      Long Term Goals: To be accomplished in 12 treatments:  1. Pt will be independent and compliant with HEP. 2. Pt will improve his FOTO score by the MCID from 50/100 to 62/100 demonstrating improved overall function with decreased pain. 3. Pt will be able to return to work in some capacity in order to provide for his family secondary to decreased ronak  4.  Pt will improve cervical ROM to normal limits without any complaints of pain or discomfort in order to improve his ability to perform functional tasks. 5. Pt will be able to play basketball without pain greater than 2/10 in order to return to hobbies. Frequency / Duration: Patient to be seen 1 times per week for 12 treatments.     PLAN  []  Upgrade activities as tolerated     []  Continue plan of care  []  Update interventions per flow sheet       []  Discharge due to:_  []  Other:_      Dottie Philippe PTA 6/15/2018  8:38 AM

## 2018-06-18 ENCOUNTER — HOSPITAL ENCOUNTER (OUTPATIENT)
Dept: CARDIAC REHAB | Age: 53
Discharge: HOME | End: 2018-06-18
Payer: MEDICARE

## 2018-06-18 VITALS — WEIGHT: 186 LBS | BODY MASS INDEX: 30.95 KG/M2

## 2018-06-18 PROCEDURE — 93798 PHYS/QHP OP CAR RHAB W/ECG: CPT

## 2018-06-22 ENCOUNTER — HOSPITAL ENCOUNTER (OUTPATIENT)
Dept: PHYSICAL THERAPY | Age: 53
Discharge: HOME | End: 2018-06-22
Payer: MEDICARE

## 2018-06-22 PROCEDURE — 97140 MANUAL THERAPY 1/> REGIONS: CPT

## 2018-06-22 PROCEDURE — 97110 THERAPEUTIC EXERCISES: CPT

## 2018-06-22 NOTE — PROGRESS NOTES
PT DAILY TREATMENT NOTE - [facility] 2-15    Patient Name: Gena Snyder  Date:2018  : 1965  [x]  Patient  Verified  Payor: Kyle Ardon / Plan: [place] MEDICARE PART A & B / Product Type: Medicare /    In time:8:00 am  Out time:9:01 am  Total Treatment Time (min): 61 minutes  Total Timed Codes (min): 61 minutes  1:1 Treatment Time ( only): 55 minutes   Visit #: 3     Treatment Area: Upper back pain [M54.9]    SUBJECTIVE  Pain Level (0-10 scale): 0/10  Any medication changes, allergies to medications, adverse drug reactions, diagnosis change, or new procedure performed?: [x] No    [] Yes (see summary sheet for update)  Subjective functional status/changes:   [] No changes reported  Pt notes occasional tweaks in R-side of neck in morning with rapid L cervical rotation, but otherwise no complaints of pain. Min stiffness in R>L shoulders in morning. Exercises going well, notes difficulty with proper form of cat-camel. OBJECTIVE      28 min Therapeutic Exercise:  [x] See flow sheet :   Rationale: increase ROM, increase strength, improve coordination and increase proprioception to improve the patients ability to perform ADLs with decreased pain or discomfort.     27 min Manual Therapy: seated thoracic extension mob, P-A mobs thoracic spine. STM/MFR thoracic paraspinals, MET to correct ERSR at T7    Rationale: decrease pain, increase ROM, increase tissue extensibility and decrease trigger points to improve the patients ability to increase thoracic mobility      With   [x] TE   [] TA   [] neuro   [] other: Patient Education: [x] Review HEP    [] Progressed/Changed HEP based on:   [] positioning   [] body mechanics   [] transfers   [] heat/ice application    [] other:        Other Objective/Functional Measures: --                   Pain Level (0-10 scale) post treatment: 0/10      ASSESSMENT/Changes in Function:   Initial assessment today demonstrated increased L thoracic rotation in sitting vs R rotation. Manual treatment focused on improving thoracic spine mobility, particularly R mid thoracic region, as well as decreasing tightness of R thoracic paraspinals with STM/trigger point release. L rotation ROM improved post-manual treatment with Pt noting feeling much looser. Reviewed cat-camel exercise with PT overpressure into thoracic extension (R & central t-spine); cues for keeping elbows straight with thoracic extension. Introduced quadruped thoracic rotation stretch for increased mobility with cues for keeping elbows bent and behind head, and limiting motion to pain-free. Patient will continue to benefit from skilled PT services to modify and progress therapeutic interventions, address functional mobility deficits, address ROM deficits, address strength deficits, analyze and address soft tissue restrictions, analyze and cue movement patterns, analyze and modify body mechanics/ergonomics and assess and modify postural abnormalities to attain remaining goals.      []  See Plan of Care  []  See progress note/recertification  []  See Discharge Summary      Progress towards goals / Updated goals:  Short Term Goals: To be accomplished in 6 treatments:  1. Pt will be independent and compliant with HEP. 2. Pt will demonstrate improved postural awareness in sitting and standing.      Long Term Goals: To be accomplished in 12 treatments:  1. Pt will be independent and compliant with HEP.   2. Pt will improve his FOTO score by the MCID from 50/100 to 62/100 demonstrating improved overall function with decreased pain. 3. Pt will be able to return to work in some capacity in order to provide for his family secondary to decreased ronak  4. Pt will improve cervical ROM to normal limits without any complaints of pain or discomfort in order to improve his ability to perform functional tasks. 5. Pt will be able to play basketball without pain greater than 2/10 in order to return to hobbies.   Frequency / Duration: Patient to be seen 1 times per week for 12 treatments.      PLAN  [x]  Upgrade activities as tolerated     [x]  Continue plan of care  [x]  Update interventions per flow sheet       []  Discharge due to:_  []  Other:_        Gustavo Dickens 6/22/2018  7:45 AM

## 2018-06-25 ENCOUNTER — HOSPITAL ENCOUNTER (OUTPATIENT)
Dept: CARDIAC REHAB | Age: 53
Discharge: HOME | End: 2018-06-25
Payer: MEDICARE

## 2018-06-25 VITALS — BODY MASS INDEX: 31.28 KG/M2 | WEIGHT: 188 LBS

## 2018-06-25 PROCEDURE — 93798 PHYS/QHP OP CAR RHAB W/ECG: CPT

## 2018-06-27 ENCOUNTER — HOSPITAL ENCOUNTER (OUTPATIENT)
Dept: CARDIAC REHAB | Age: 53
Discharge: HOME | End: 2018-06-27
Payer: MEDICARE

## 2018-06-27 VITALS — WEIGHT: 190 LBS | BODY MASS INDEX: 31.62 KG/M2

## 2018-06-27 PROCEDURE — 93798 PHYS/QHP OP CAR RHAB W/ECG: CPT

## 2018-06-29 ENCOUNTER — HOSPITAL ENCOUNTER (OUTPATIENT)
Dept: PHYSICAL THERAPY | Age: 53
Discharge: HOME | End: 2018-06-29
Payer: MEDICARE

## 2018-06-29 PROCEDURE — 97110 THERAPEUTIC EXERCISES: CPT

## 2018-06-29 PROCEDURE — 97140 MANUAL THERAPY 1/> REGIONS: CPT

## 2018-06-29 NOTE — PROGRESS NOTES
PT DAILY TREATMENT NOTE - [facility] 2-15    Patient Name: Vijaya Bain  Date:2018  : 1965  [x]  Patient  Verified  Payor: Arnaldo Delarosa / Plan: [place] / Product Type: Medicare /    In time:9:30 am  Out time:10:31 am  Total Treatment Time (min): 61 minutes  Total Timed Codes (min): 61 minutes  1:1 Treatment Time ( only): 32 minutes    Visit #: 4     Treatment Area: Upper back pain [M54.9]    SUBJECTIVE  Pain Level (0-10 scale): 0/10  Any medication changes, allergies to medications, adverse drug reactions, diagnosis change, or new procedure performed?: [x] No    [] Yes (see summary sheet for update)  Subjective functional status/changes:   [] No changes reported  Pt reports he noticed tweak/tightness in back when he was lifting box off the ground yesterday, but it loosened up soon after. He hasn't had any pain since last session otherwise. His neck rotation feels better and feels like the neck stretches help loosen it up in the morning.       OBJECTIVE       51 min Therapeutic Exercise:  [x] See flow sheet :   Rationale: increase ROM, increase strength, improve coordination and increase proprioception to improve the patients ability to perform ADLs with decreased pain or discomfort.      10 min Manual Therapy: seated thoracic extension mob, P-A mobs thoracic spine   Rationale: decrease pain, increase ROM, increase tissue extensibility and decrease trigger points to improve the patients ability to increase thoracic mobility      With   [x] TE   [] TA   [] neuro   [] other: Patient Education: [x] Review HEP    [] Progressed/Changed HEP based on:   [] positioning   [] body mechanics   [] transfers   [] heat/ice application    [] other:        Other Objective/Functional Measures: --                   Pain Level (0-10 scale) post treatment: 0/10      ASSESSMENT/Changes in Function:     Pt able to tolerate all exercises and progressions without complaints of pain or discomfort.   Pt demonstrated significantly better thoracic mobility today in initial assessment this treatment session. Manual techniques continued to improve thoracic rotation mobility and helping the Pt feel looser. Initiated prone Ts, Ys, and Is with Pt noting fatigue in shoulder blade region, particularly with Ys for lower traps; cues to decrease shoulder shrug and squeeze shoulder blades together, and demonstrated how to perform them at home without a swiss ball. Patient will continue to benefit from skilled PT services to modify and progress therapeutic interventions, address functional mobility deficits, address ROM deficits, address strength deficits, analyze and address soft tissue restrictions, analyze and cue movement patterns, analyze and modify body mechanics/ergonomics and assess and modify postural abnormalities to attain remaining goals.      []  See Plan of Care  []  See progress note/recertification  []  See Discharge Summary      Progress towards goals / Updated goals:  Short Term Goals: To be accomplished in 6 treatments:  1. Pt will be independent and compliant with HEP. 2. Pt will demonstrate improved postural awareness in sitting and standing.      Long Term Goals: To be accomplished in 12 treatments:  1. Pt will be independent and compliant with HEP.   2. Pt will improve his FOTO score by the MCID from 50/100 to 62/100 demonstrating improved overall function with decreased pain. 3. Pt will be able to return to work in some capacity in order to provide for his family secondary to decreased ronak  4. Pt will improve cervical ROM to normal limits without any complaints of pain or discomfort in order to improve his ability to perform functional tasks. 5. Pt will be able to play basketball without pain greater than 2/10 in order to return to hobbies.   Frequency / Duration: Patient to be seen 1 times per week for 12 treatments.      PLAN  [x]  Upgrade activities as tolerated     [x]  Continue plan of care  [x]  Update interventions per flow sheet       []  Discharge due to:_  []  Other:_        Jarrod Aguirre 6/29/2018  7:17 AM

## 2018-07-02 ENCOUNTER — HOSPITAL ENCOUNTER (OUTPATIENT)
Dept: CARDIAC REHAB | Age: 53
Discharge: HOME | End: 2018-07-02
Payer: MEDICARE

## 2018-07-02 VITALS — WEIGHT: 187 LBS | BODY MASS INDEX: 31.12 KG/M2

## 2018-07-02 PROCEDURE — 93798 PHYS/QHP OP CAR RHAB W/ECG: CPT

## 2018-07-05 ENCOUNTER — HOSPITAL ENCOUNTER (OUTPATIENT)
Dept: PHYSICAL THERAPY | Age: 53
Discharge: HOME | End: 2018-07-05
Payer: MEDICARE

## 2018-07-05 PROCEDURE — 97140 MANUAL THERAPY 1/> REGIONS: CPT

## 2018-07-05 PROCEDURE — 97110 THERAPEUTIC EXERCISES: CPT

## 2018-07-05 NOTE — PROGRESS NOTES
PT DAILY TREATMENT NOTE - [facility] 2-15    Patient Name: Ivan Adams  Date:2018  : 1965  [x]  Patient  Verified  Payor: Willam Solders / Plan: [place] MEDICARE PART A & [place] / Product Type: Medicare /    In time:5:32 pm  Out time:6:34 pm  Total Treatment Time (min): 62 minutes  Total Timed Codes (min): 62 minutes  1:1 Treatment Time ([facility] only): 46 minutes   Visit #: 5     Treatment Area: There are no admission diagnoses documented for this encounter. SUBJECTIVE  Pain Level (0-10 scale): 0/10  Any medication changes, allergies to medications, adverse drug reactions, diagnosis change, or new procedure performed?: [x] No    [] Yes (see summary sheet for update)  Subjective functional status/changes:   [] No changes reported  Pt notes tightness daily with bending down to retrieve objects and occasionally with R rotation. However, he has not been experiencing pain, and continues to do his HEP diligently. OBJECTIVE       48 min Therapeutic Exercise:  [x] See flow sheet :   Rationale: increase ROM, increase strength, improve coordination and increase proprioception to improve the patients ability to perform ADLs with decreased pain or discomfort.      14 min Manual Therapy: seated thoracic extension mob, P-A mobs thoracic spine   Rationale: decrease pain, increase ROM, increase tissue extensibility and decrease trigger points to improve the patients ability to increase thoracic mobility      With   [x] TE   [] TA   [] neuro   [] other: Patient Education: [x] Review HEP    [] Progressed/Changed HEP based on:   [] positioning   [] body mechanics   [] transfers   [] heat/ice application    [] other:        Other Objective/Functional Measures: --                   Pain Level (0-10 scale) post treatment: 0/10      ASSESSMENT/Changes in Function:   Initiated D2 flexion in half-kneel facilitating core activation and dynamic rotation/stabilization;  Pt noted it feeling great and targeting the right musculature in movement while also challenging to the core. Continue to emphasize exercises that incorporate dynamic rotation and stability. Pt was able to tolerate all exercises without increased pain, and noted feeling looser with increased motion post-exercises. Pt reported feeling best stretch with seated thoracic mobilization facilitating R rotation. Patient will continue to benefit from skilled PT services to modify and progress therapeutic interventions, address functional mobility deficits, address ROM deficits, address strength deficits, analyze and address soft tissue restrictions, analyze and cue movement patterns, analyze and modify body mechanics/ergonomics and assess and modify postural abnormalities to attain remaining goals.      []  See Plan of Care  []  See progress note/recertification  []  See Discharge Summary      Progress towards goals / Updated goals:  Short Term Goals: To be accomplished in 6 treatments:  1. Pt will be independent and compliant with HEP. 2. Pt will demonstrate improved postural awareness in sitting and standing.      Long Term Goals: To be accomplished in 12 treatments:  1. Pt will be independent and compliant with HEP.   2. Pt will improve his FOTO score by the MCID from 50/100 to 62/100 demonstrating improved overall function with decreased pain. 3. Pt will be able to return to work in some capacity in order to provide for his family secondary to decreased ronak  4. Pt will improve cervical ROM to normal limits without any complaints of pain or discomfort in order to improve his ability to perform functional tasks. 5. Pt will be able to play basketball without pain greater than 2/10 in order to return to hobbies.   Frequency / Duration: Patient to be seen 1 times per week for 12 treatments.      PLAN  [x]  Upgrade activities as tolerated     [x]  Continue plan of care  [x]  Update interventions per flow sheet       []  Discharge due to:_  []  Other:_      Nataly Nassar Sary 7/5/2018  9:59 AM

## 2018-07-09 ENCOUNTER — HOSPITAL ENCOUNTER (OUTPATIENT)
Dept: CARDIAC REHAB | Age: 53
Discharge: HOME | End: 2018-07-09
Payer: MEDICARE

## 2018-07-09 VITALS — BODY MASS INDEX: 31.12 KG/M2 | WEIGHT: 187 LBS

## 2018-07-09 PROCEDURE — 93798 PHYS/QHP OP CAR RHAB W/ECG: CPT

## 2018-07-11 ENCOUNTER — APPOINTMENT (OUTPATIENT)
Dept: CARDIAC REHAB | Age: 53
End: 2018-07-11
Payer: MEDICARE

## 2018-07-13 ENCOUNTER — HOSPITAL ENCOUNTER (OUTPATIENT)
Dept: PHYSICAL THERAPY | Age: 53
Discharge: HOME | End: 2018-07-13
Payer: MEDICARE

## 2018-07-13 PROCEDURE — 97110 THERAPEUTIC EXERCISES: CPT

## 2018-07-13 NOTE — PROGRESS NOTES
PT DAILY TREATMENT NOTE - [facility] 2-15    Patient Name: Rubia Wolf  Date:2018  : 1965  [x]  Patient  Verified  Payor: Louisa Mckee / Plan: [place] MED[place]ARE PART A [place] / Product Type: Medicare /    In time:10:30 am  Out time:11:25  Total Treatment Time (min): 55 minutes  Total Timed Codes (min): 55 minutes  1:1 Treatment Time ( only): 45 minutes  Visit #: 6     Treatment Area: Lower back pain [M54.5]    SUBJECTIVE  Pain Level (0-10 scale): 0/10  Any medication changes, allergies to medications, adverse drug reactions, diagnosis change, or new procedure performed?: [x] No    [] Yes (see summary sheet for update)  Subjective functional status/changes:   [] No changes reported  Pt reports he has had a very good week and has not been experiencing the same tightness that he was previously. He notes that the exercise on the cable column last session (B D2 flexion) was really helpful and targeted the right muscles.     OBJECTIVE       55 min Therapeutic Exercise:  [x] See flow sheet :   Rationale: increase ROM, increase strength, improve coordination and increase proprioception to improve the patients ability to perform ADLs with decreased pain or discomfort.      -- min Manual Therapy: seated thoracic extension mob, P-A mobs thoracic spine   Rationale: decrease pain, increase ROM, increase tissue extensibility and decrease trigger points to improve the patients ability to increase thoracic mobility      With   [x] TE   [] TA   [] neuro   [x] other: Patient Education: [x] Review HEP    [] Progressed/Changed HEP based on:   [] positioning   [] body mechanics   [] transfers   [] heat/ice application    [x] other: Discussed with Pt plan to D/C next session with Pt understanding and in agreement.       Other Objective/Functional Measures: --                   Pain Level (0-10 scale) post treatment: 0/10      ASSESSMENT/Changes in Function:   Introduced spiderman exercise for increase scapular strength and stability; Pt performed without pain, though noted fatigue in musculature. Pt tolerated all exercises today without any pain or discomfort. Patient will continue to benefit from skilled PT services to modify and progress therapeutic interventions, address functional mobility deficits, address ROM deficits, address strength deficits, analyze and address soft tissue restrictions, analyze and cue movement patterns, analyze and modify body mechanics/ergonomics and assess and modify postural abnormalities to attain remaining goals.      []  See Plan of Care  []  See progress note/recertification  []  See Discharge Summary      Progress towards goals / Updated goals:  Short Term Goals: To be accomplished in 6 treatments:  1. Pt will be independent and compliant with HEP. 2. Pt will demonstrate improved postural awareness in sitting and standing.      Long Term Goals: To be accomplished in 12 treatments:  1. Pt will be independent and compliant with HEP.   2. Pt will improve his FOTO score by the MCID from 50/100 to 62/100 demonstrating improved overall function with decreased pain. 3. Pt will be able to return to work in some capacity in order to provide for his family secondary to decreased ronak  4. Pt will improve cervical ROM to normal limits without any complaints of pain or discomfort in order to improve his ability to perform functional tasks. 5. Pt will be able to play basketball without pain greater than 2/10 in order to return to hobbies.   Frequency / Duration: Patient to be seen 1 times per week for 12 treatments.      PLAN  [x]  Upgrade activities as tolerated     [x]  Continue plan of care  [x]  Update interventions per flow sheet       []  Discharge due to:_  []  Other:_        Brendan Leslie 7/13/2018  8:01 AM

## 2018-07-16 ENCOUNTER — APPOINTMENT (OUTPATIENT)
Dept: CARDIAC REHAB | Age: 53
End: 2018-07-16
Payer: MEDICARE

## 2018-07-18 ENCOUNTER — HOSPITAL ENCOUNTER (OUTPATIENT)
Dept: CARDIAC REHAB | Age: 53
Discharge: HOME | End: 2018-07-18
Payer: MEDICARE

## 2018-07-18 VITALS — WEIGHT: 188 LBS | BODY MASS INDEX: 31.28 KG/M2

## 2018-07-18 PROCEDURE — 93798 PHYS/QHP OP CAR RHAB W/ECG: CPT

## 2018-07-20 ENCOUNTER — HOSPITAL ENCOUNTER (OUTPATIENT)
Dept: PHYSICAL THERAPY | Age: 53
End: 2018-07-20
Payer: MEDICARE

## 2018-07-23 ENCOUNTER — HOSPITAL ENCOUNTER (OUTPATIENT)
Dept: CARDIAC REHAB | Age: 53
Discharge: HOME | End: 2018-07-23
Payer: MEDICARE

## 2018-07-23 VITALS — WEIGHT: 188 LBS | BODY MASS INDEX: 31.28 KG/M2

## 2018-07-23 PROCEDURE — 93798 PHYS/QHP OP CAR RHAB W/ECG: CPT

## 2018-07-25 ENCOUNTER — HOSPITAL ENCOUNTER (OUTPATIENT)
Dept: PHYSICAL THERAPY | Age: 53
Discharge: HOME | End: 2018-07-25
Payer: MEDICARE

## 2018-07-25 PROCEDURE — 97110 THERAPEUTIC EXERCISES: CPT

## 2018-07-25 NOTE — PROGRESS NOTES
PT DAILY TREATMENT NOTE - [facility] 2-15    Patient Name: Arnoldo Branch  Date:2018  : 1965  [x]  Patient  Verified  Payor: Analilia Solomon / Plan: [place] MEDICARE PART A & B / Product Type: Medicare /    In time:3:19 pm  Out time:4:02 pm  Total Treatment Time (min): 43 minutes  Total Timed Codes (min): 43 minutes  1:1 Treatment Time ( only): 43 minutes   Visit #: 7      Treatment Area: Lower back pain [M54.5]     SUBJECTIVE  Pain Level (0-10 scale): 0/10  Any medication changes, allergies to medications, adverse drug reactions, diagnosis change, or new procedure performed?: [x] No    [] Yes (see summary sheet for update)  Subjective functional status/changes:   [] No changes reported  Pt reports no pain in back at all anymore. Pt able to reach and  things up without any issue.   Minimal stiffness in the morning, but he does his stretches which completely relieves his pain.        OBJECTIVE       43 min Therapeutic Exercise:  [x] See flow sheet :   Rationale: increase ROM, increase strength, improve coordination and increase proprioception to improve the patients ability to perform ADLs with decreased pain or discomfort.      -- min Manual Therapy: seated thoracic extension mob, P-A mobs thoracic spine   Rationale: decrease pain, increase ROM, increase tissue extensibility and decrease trigger points to improve the patients ability to increase thoracic mobility      With   [x] TE   [] TA   [] neuro   [] other: Patient Education: [x] Review HEP    [] Progressed/Changed HEP based on:   [] positioning   [] body mechanics   [] transfers   [] heat/ice application    [] other:        Other Objective/Functional Measures: --                   Pain Level (0-10 scale) post treatment: 0/10      ASSESSMENT/Changes in Function:       []  See Plan of Care  []  See progress note/recertification  [x]  See Discharge Summary      Progress towards goals / Updated goals:  Short Term Goals: To be accomplished in 6 treatments:  1. Pt will be independent and compliant with HEP. - met  2. Pt will demonstrate improved postural awareness in sitting and standing.  - met      Long Term Goals: To be accomplished in 12 treatments:  1. Pt will be independent and compliant with HEP. - met   2. Pt will improve his FOTO score by the MCID from 50/100 to 62/100 demonstrating improved overall function with decreased pain. 70/100  3. Pt will be able to return to work in some capacity in order to provide for his family secondary to decreased pain. - progressing (planning to return to work, Pt has no concerns)  4. Pt will improve cervical ROM to normal limits without any complaints of pain or discomfort in order to improve his ability to perform functional tasks. - met   5. Pt will be able to play basketball without pain greater than 2/10 in order to return to hobbies. - progressing (has not attempted, planning to return)    Frequency / Duration: Patient to be seen 1 times per week for 12 treatments.      PLAN  []  Upgrade activities as tolerated     []  Continue plan of care  []  Update interventions per flow sheet       [x]  Discharge due to: Pt reaching or progressing towards all short and long-term goals.   []  Other:Walker Do 7/25/2018  12:28 PM
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
## 2018-07-27 ENCOUNTER — APPOINTMENT (OUTPATIENT)
Dept: PHYSICAL THERAPY | Age: 53
End: 2018-07-27
Payer: MEDICARE

## 2018-08-01 ENCOUNTER — HOSPITAL ENCOUNTER (OUTPATIENT)
Dept: CARDIAC REHAB | Age: 53
Discharge: HOME | End: 2018-08-01
Payer: MEDICARE

## 2018-08-01 VITALS — BODY MASS INDEX: 30.79 KG/M2 | WEIGHT: 185 LBS

## 2018-08-01 PROCEDURE — 93798 PHYS/QHP OP CAR RHAB W/ECG: CPT

## 2018-08-08 ENCOUNTER — HOSPITAL ENCOUNTER (OUTPATIENT)
Dept: CARDIAC REHAB | Age: 53
Discharge: HOME | End: 2018-08-08
Payer: MEDICARE

## 2018-08-08 VITALS — BODY MASS INDEX: 30.95 KG/M2 | WEIGHT: 186 LBS

## 2018-08-08 PROCEDURE — 93798 PHYS/QHP OP CAR RHAB W/ECG: CPT

## 2018-08-13 ENCOUNTER — HOSPITAL ENCOUNTER (OUTPATIENT)
Dept: CARDIAC REHAB | Age: 53
Discharge: HOME | End: 2018-08-13
Payer: MEDICARE

## 2018-08-13 VITALS — BODY MASS INDEX: 31.45 KG/M2 | WEIGHT: 189 LBS

## 2018-08-13 PROCEDURE — 93798 PHYS/QHP OP CAR RHAB W/ECG: CPT

## 2018-08-20 ENCOUNTER — HOSPITAL ENCOUNTER (OUTPATIENT)
Dept: CARDIAC REHAB | Age: 53
Discharge: HOME | End: 2018-08-20
Payer: MEDICARE

## 2018-08-20 VITALS — WEIGHT: 189 LBS | BODY MASS INDEX: 31.45 KG/M2

## 2018-08-20 PROCEDURE — 93798 PHYS/QHP OP CAR RHAB W/ECG: CPT

## 2018-08-21 DIAGNOSIS — G44.321 INTRACTABLE CHRONIC POST-TRAUMATIC HEADACHE: ICD-10-CM

## 2018-08-21 DIAGNOSIS — G43.709 CHRONIC MIGRAINE WITHOUT AURA WITHOUT STATUS MIGRAINOSUS, NOT INTRACTABLE: ICD-10-CM

## 2018-08-21 RX ORDER — GABAPENTIN 600 MG/1
TABLET ORAL
Qty: 105 TAB | Refills: 0 | Status: SHIPPED | OUTPATIENT
Start: 2018-08-21 | End: 2018-09-19

## 2018-08-22 ENCOUNTER — HOSPITAL ENCOUNTER (OUTPATIENT)
Dept: CARDIAC REHAB | Age: 53
End: 2018-08-22
Payer: MEDICARE

## 2018-08-24 ENCOUNTER — OFFICE VISIT (OUTPATIENT)
Dept: INTERNAL MEDICINE CLINIC | Age: 53
End: 2018-08-24

## 2018-08-24 VITALS
HEART RATE: 54 BPM | TEMPERATURE: 97.6 F | DIASTOLIC BLOOD PRESSURE: 85 MMHG | OXYGEN SATURATION: 97 % | BODY MASS INDEX: 30.66 KG/M2 | WEIGHT: 184 LBS | HEIGHT: 65 IN | SYSTOLIC BLOOD PRESSURE: 125 MMHG | RESPIRATION RATE: 16 BRPM

## 2018-08-24 DIAGNOSIS — E78.00 HYPERCHOLESTEREMIA: ICD-10-CM

## 2018-08-24 DIAGNOSIS — F33.9 RECURRENT DEPRESSION (HCC): ICD-10-CM

## 2018-08-24 DIAGNOSIS — Z13.39 SCREENING FOR ALCOHOLISM: ICD-10-CM

## 2018-08-24 DIAGNOSIS — Z12.11 SCREEN FOR COLON CANCER: ICD-10-CM

## 2018-08-24 DIAGNOSIS — Z12.5 SPECIAL SCREENING FOR MALIGNANT NEOPLASM OF PROSTATE: ICD-10-CM

## 2018-08-24 DIAGNOSIS — Z13.6 SCREENING FOR ISCHEMIC HEART DISEASE: ICD-10-CM

## 2018-08-24 DIAGNOSIS — E55.9 HYPOVITAMINOSIS D: ICD-10-CM

## 2018-08-24 DIAGNOSIS — I21.11 ST ELEVATION (STEMI) MYOCARDIAL INFARCTION INVOLVING RIGHT CORONARY ARTERY (HCC): ICD-10-CM

## 2018-08-24 DIAGNOSIS — Z13.31 SCREENING FOR DEPRESSION: ICD-10-CM

## 2018-08-24 DIAGNOSIS — Z13.1 SCREENING FOR DIABETES MELLITUS: ICD-10-CM

## 2018-08-24 DIAGNOSIS — Z00.00 MEDICARE ANNUAL WELLNESS VISIT, INITIAL: Primary | ICD-10-CM

## 2018-08-24 NOTE — ASSESSMENT & PLAN NOTE
This condition is managed by Specialist.  Key CAD CHF Meds             rosuvastatin (CRESTOR) 20 mg tablet  (Taking) Take 1 Tab by mouth nightly. clopidogrel (PLAVIX) 75 mg tab  (Taking) TAKE 1 TABLET BY ORAL ROUTE EVERY DAY    aspirin 81 mg chewable tablet  (Taking) Take 1 Tab by mouth daily. metoprolol tartrate (LOPRESSOR) 25 mg tablet  (Taking) Take 1 Tab by mouth every twelve (12) hours. ticagrelor (BRILINTA) 90 mg tablet Take 1 Tab by mouth every twelve (12) hours every twelve (12) hours.         [id number](BNP,BNPP,BNPPPOC,HSCRP,NA,NAPOC,K,KPOCT,CHOL,CHOLPOCT,HDL,HDLPOC,LDLCHOL,LDLPOCT,LDLCPOC,LDLC,LDL,LDLCEXT,TRIGL,TGLPOCT,INR,INREXT,INRPOC,PTP,PTINR,PTEXT,DIG)@

## 2018-08-24 NOTE — ASSESSMENT & PLAN NOTE
Stable, based on history, physical exam and review of pertinent labs, studies and medications; meds reconciled; continue current treatment plan. Key Psychotherapeutic Meds             ARIPiprazole (ABILIFY) 2 mg tablet  (Taking) Take  by mouth daily. traZODone (DESYREL) 50 mg tablet  (Taking) Take 25 mg by mouth nightly. sertraline (ZOLOFT) 100 mg tablet  (Taking) Take 200 mg by mouth daily.         Other [address]             gabapentin (NEURONTIN) 600 mg tablet  (Taking) TAKE 1 TABLET(600MG) BY MOUTH IN THE MORNING-1 TABLET(600MG) AT NOON & 1 &1/2 TABLETS(900MG) AT NIGHT        Lab Results   Component Value Date/Time    Sodium 142 05/18/2018 09:52 AM    Creatinine 0.98 05/18/2018 09:52 AM    WBC 7.9 10/11/2017 02:31 AM    ALT (SGPT) 45 05/18/2018 09:52 AM    AST (SGOT) 32 05/18/2018 09:52 AM

## 2018-08-24 NOTE — PATIENT INSTRUCTIONS
Medicare Wellness Visit, Male    The best way to live healthy is to have a lifestyle where you eat a well-balanced diet, exercise regularly, limit alcohol use, and quit all forms of tobacco/nicotine, if applicable. Regular preventive services are another way to keep healthy. Preventive services (vaccines, screening tests, monitoring & exams) can help personalize your care plan, which helps you manage your own care. Screening tests can find health problems at the earliest stages, when they are easiest to treat. [address] [facility] follows the current, evidence-based guidelines published by the [facility] [place] ([facility]) when recommending preventive services for our patients. Because we follow these guidelines, sometimes recommendations change over time as research supports it. (For example, a prostate screening blood test is no longer routinely recommended for men with no symptoms.)  Of course, you and your doctor may decide to screen more often for some diseases, based on your risk and co-morbidities (chronic disease you are already diagnosed with). Preventive services for you include:  - Medicare offers their members a free annual wellness visit, which is time for you and your primary care provider to discuss and plan for your preventive service needs. Take advantage of this benefit every year!  -All adults over age 72 should receive the recommended pneumonia vaccines. Current USPSTF guidelines recommend a series of two vaccines for the best pneumonia protection.   -All adults should have a flu vaccine yearly and an ECG.  All adults age 61 and older should receive a shingles vaccine once in their lifetime.    -All adults age 38-68 who are overweight should have a diabetes screening test once every three years.   -Other screening tests & preventive services for persons with diabetes include: an eye exam to screen for diabetic retinopathy, a kidney function test, a foot exam, and stricter control over your cholesterol.   -Cardiovascular screening for adults with routine risk involves an electrocardiogram (ECG) at intervals determined by the provider.   -Colorectal cancer screening should be done for adults age 54-65 with no increased risk factors for colorectal cancer. There are a number of acceptable methods of screening for this type of cancer. Each test has its own benefits and drawbacks. Discuss with your provider what is most appropriate for you during your annual wellness visit. The different tests include: colonoscopy (considered the best screening method), a fecal occult blood test, a fecal DNA test, and sigmoidoscopy.  -All adults born between [facility] should be screened once for Hepatitis C.  -An Abdominal Aortic Aneurysm (AAA) Screening is recommended for men age 73-68 who has ever smoked in their lifetime. Here is a list of your current Health Maintenance items (your personalized list of preventive services) with a due date:  Health Maintenance Due   Topic Date Due    Annual Well Visit  06/06/2018    Flu Vaccine  08/01/2018       Medicare Wellness Visit, Male    The best way to live healthy is to have a lifestyle where you eat a well-balanced diet, exercise regularly, limit alcohol use, and quit all forms of tobacco/nicotine, if applicable. Regular preventive services are another way to keep healthy. Preventive services (vaccines, screening tests, monitoring & exams) can help personalize your care plan, which helps you manage your own care. Screening tests can find health problems at the earliest stages, when they are easiest to treat. [address] [facility] follows the current, evidence-based guidelines published by the [facility] [place] (USPSTF) when recommending preventive services for our patients. Because we follow these guidelines, sometimes recommendations change over time as research supports it.  (For example, a prostate screening blood test is no longer routinely recommended for men with no symptoms.)  Of course, you and your doctor may decide to screen more often for some diseases, based on your risk and co-morbidities (chronic disease you are already diagnosed with). Preventive services for you include:  - Medicare offers their members a free annual wellness visit, which is time for you and your primary care provider to discuss and plan for your preventive service needs. Take advantage of this benefit every year!  -All adults over age 72 should receive the recommended pneumonia vaccines. Current USPSTF guidelines recommend a series of two vaccines for the best pneumonia protection.   -All adults should have a flu vaccine yearly and an ECG. All adults age 61 and older should receive a shingles vaccine once in their lifetime.    -All adults age 38-68 who are overweight should have a diabetes screening test once every three years.   -Other screening tests & preventive services for persons with diabetes include: an eye exam to screen for diabetic retinopathy, a kidney function test, a foot exam, and stricter control over your cholesterol.   -Cardiovascular screening for adults with routine risk involves an electrocardiogram (ECG) at intervals determined by the provider.   -Colorectal cancer screening should be done for adults age 54-65 with no increased risk factors for colorectal cancer. There are a number of acceptable methods of screening for this type of cancer. Each test has its own benefits and drawbacks. Discuss with your provider what is most appropriate for you during your annual wellness visit.  The different tests include: colonoscopy (considered the best screening method), a fecal occult blood test, a fecal DNA test, and sigmoidoscopy.  -All adults born between [facility] should be screened once for Hepatitis C.  -An Abdominal Aortic Aneurysm (AAA) Screening is recommended for men age 73-68 who has ever smoked in their lifetime.      Here is a list of your current Health Maintenance items (your personalized list of preventive services) with a due date:  Health Maintenance Due   Topic Date Due    Annual Well Visit  06/06/2018    Flu Vaccine  08/01/2018

## 2018-08-24 NOTE — MR AVS SNAPSHOT
[address] [place] [address] [facility] [id number] 
[phone] Patient: Abraham Zurita MRN: [id number] :1965 Visit Information Date & Time Provider Department Dept. Phone Encounter #  
 2018  9:00 AM Arpita Loco MD [facility] [phone] [id number] Follow-up Instructions Return if symptoms worsen or fail to improve. Follow-up and Disposition History Upcoming Health Maintenance Date Due  
 MEDICARE YEARLY EXAM 2018 Influenza Age 5 to Adult 2018 FOBT Q 1 YEAR AGE 50-75 2018 DTaP/Tdap/Td series (2 - Td) 3/3/2026 Allergies as of 2018  Review Complete On: 2018 By: Arpita Loco MD  
 No Known Allergies Current Immunizations  Reviewed on 2017 Name Date Influenza Vaccine 2012 Tdap 3/3/2016 Not reviewed this visit You Were Diagnosed With   
  
 Codes Comments Medicare annual wellness visit, initial    -  Primary ICD-10-CM: Z00.00 ICD-9-CM: V70.0 Screening for alcoholism     ICD-10-CM: Z13.89 ICD-9-CM: V79.1 Screening for depression     ICD-10-CM: Z13.89 ICD-9-CM: V79.0 Screen for colon cancer     ICD-10-CM: Z12.11 ICD-9-CM: V76.51 Hypovitaminosis D     ICD-10-CM: E55.9 ICD-9-CM: 268.9 Hypercholesteremia     ICD-10-CM: E78.00 ICD-9-CM: 272.0 Screening for diabetes mellitus     ICD-10-CM: Z13.1 ICD-9-CM: V77.1 Screening for ischemic heart disease     ICD-10-CM: Z13.6 ICD-9-CM: V81.0 Special screening for malignant neoplasm of prostate     ICD-10-CM: Z12.5 ICD-9-CM: V76.44 Recurrent depression ([facility]     ICD-10-CM: F33.9 ICD-9-CM: 296.30 ST elevation (STEMI) myocardial infarction involving right coronary artery ([facility]     ICD-10-CM: I21.11 ICD-9-CM: 410.31 Vitals BP Pulse Temp Resp Height(growth percentile) Weight(growth percentile) 125/85 (BP 1 Location: Right arm, BP Patient Position: Sitting) (!) 54 97.6 °F (36.4 °C) (Oral) 16 5' 5\" (1.651 m) 184 lb (83.5 kg) SpO2 BMI Smoking Status 97% 30.62 kg/m2 Never Smoker Vitals History BMI and BSA Data Body Mass Index Body Surface Area  
 30.62 kg/m 2 1.96 m 2 Preferred Pharmacy Pharmacy Name Phone [facility], [address] RUDD Your Updated Medication List  
  
   
This list is accurate as of 8/24/18  9:04 AM.  Always use your most recent med list.  
  
  
  
  
 ARIPiprazole 2 mg tablet Commonly known as:  ABILIFY Take  by mouth daily. aspirin 81 mg chewable tablet Take 1 Tab by mouth daily. clopidogrel 75 mg Tab Commonly known as:  PLAVIX TAKE 1 TABLET BY ORAL ROUTE EVERY DAY  
  
 gabapentin 600 mg tablet Commonly known as:  NEURONTIN  
TAKE 1 TABLET(600MG) BY MOUTH IN THE MORNING-1 TABLET(600MG) AT NOON & 1 &1/2 TABLETS(900MG) AT NIGHT  
  
 metoprolol tartrate 25 mg tablet Commonly known as:  LOPRESSOR Take 1 Tab by mouth every twelve (12) hours. rosuvastatin 20 mg tablet Commonly known as:  CRESTOR Take 1 Tab by mouth nightly. ticagrelor 90 mg tablet Commonly known as:  [employer] Take 1 Tab by mouth every twelve (12) hours every twelve (12) hours. traZODone 50 mg tablet Commonly known as:  [employer] [place] Take 25 mg by mouth nightly. ZOLOFT 100 mg tablet Generic drug:  sertraline Take 200 mg by mouth daily. We Performed the Following [facility] [[id number]] GLUCOSE, RANDOM [id number] CPT(R)] LIPID PANEL [36716 CPT(R)] MT ANNUAL ALCOHOL SCREEN 15 MIN [id number] [facility]] VITAMIN D, 25 HYDROXY [id number] CPT(R)] Follow-up Instructions Return if symptoms worsen or fail to improve.   
  
To-Do List   
 08/27/2018 9:30 AM  
 Appointment with [address] at [address] [place] [address] ([phone])  
  
 08/29/2018 9:30 AM  
  Appointment with [address] at [address] [place] [address] ([phone])  
  
 09/05/2018 9:30 AM  
  Appointment with [address] at [address] [place] [address] ([phone])  
  
 09/10/2018 9:30 AM  
  Appointment with [address] at [address] [place] [address] ([phone])  
  
 09/12/2018 9:30 AM  
  Appointment with [address] at [address] [place] [address] ([phone])  
  
 09/17/2018 9:30 AM  
  Appointment with [address] at [address] [place] [address] ([phone])  
  
 09/19/2018 9:30 AM  
  Appointment with [address] at [address] [place] [address] ([phone])  
  
 09/24/2018 9:30 AM  
  Appointment with [address] at [address] [place] [address] ([phone])  
  
 09/26/2018 9:30 AM  
  Appointment with [address] at [address] [place] [address] ([phone]) Patient Instructions Medicare Wellness Visit, Male The best way to live healthy is to have a lifestyle where you eat a well-balanced diet, exercise regularly, limit alcohol use, and quit all forms of tobacco/nicotine, if applicable. Regular preventive services are another way to keep healthy. Preventive services (vaccines, screening tests, monitoring & exams) can help personalize your care plan, which helps you manage your own care. Screening tests can find health problems at the earliest stages, when they are easiest to treat. [facility] follows the current, evidence-based guidelines published by the [place] (USPSTF) when recommending preventive services for our patients. Because we follow these guidelines, sometimes recommendations change over time as research supports it.  (For example, a prostate screening blood test is no longer routinely recommended for men with no symptoms.) Of course, you and your doctor may decide to screen more often for some diseases, based on your risk and co-morbidities (chronic disease you are already diagnosed with). Preventive services for you include: - Medicare offers their members a free annual wellness visit, which is time for you and your primary care provider to discuss and plan for your preventive service needs. Take advantage of this benefit every year! 
-All adults over age 72 should receive the recommended pneumonia vaccines. Current USPSTF guidelines recommend a series of two vaccines for the best pneumonia protection.  
-All adults should have a flu vaccine yearly and an ECG. All adults age 61 and older should receive a shingles vaccine once in their lifetime.   
-All adults age 38-68 who are overweight should have a diabetes screening test once every three years.  
-Other screening tests & preventive services for persons with diabetes include: an eye exam to screen for diabetic retinopathy, a kidney function test, a foot exam, and stricter control over your cholesterol.  
-Cardiovascular screening for adults with routine risk involves an electrocardiogram (ECG) at intervals determined by the provider.  
-Colorectal cancer screening should be done for adults age 54-65 with no increased risk factors for colorectal cancer. There are a number of acceptable methods of screening for this type of cancer. Each test has its own benefits and drawbacks. Discuss with your provider what is most appropriate for you during your annual wellness visit. The different tests include: colonoscopy (considered the best screening method), a fecal occult blood test, a fecal DNA test, and sigmoidoscopy. 
-All adults born between [facility] should be screened once for Hepatitis C. 
-An Abdominal Aortic Aneurysm (AAA) Screening is recommended for men age 73-68 who has ever smoked in their lifetime. Here is a list of your current Health Maintenance items (your personalized list of preventive services) with a due date: 
Health Maintenance Due Topic Date Due  
 Annual Well Visit  06/06/2018  Flu Vaccine  08/01/2018 Medicare Wellness Visit, Male The best way to live healthy is to have a lifestyle where you eat a well-balanced diet, exercise regularly, limit alcohol use, and quit all forms of tobacco/nicotine, if applicable. Regular preventive services are another way to keep healthy. Preventive services (vaccines, screening tests, monitoring & exams) can help personalize your care plan, which helps you manage your own care. Screening tests can find health problems at the earliest stages, when they are easiest to treat. [facility] follows the current, evidence-based guidelines published by the [facility] [place] ([facility]) when recommending preventive services for our patients. Because we follow these guidelines, sometimes recommendations change over time as research supports it. (For example, a prostate screening blood test is no longer routinely recommended for men with no symptoms.) Of course, you and your doctor may decide to screen more often for some diseases, based on your risk and co-morbidities (chronic disease you are already diagnosed with). Preventive services for you include: - Medicare offers their members a free annual wellness visit, which is time for you and your primary care provider to discuss and plan for your preventive service needs. Take advantage of this benefit every year! 
-All adults over age 72 should receive the recommended pneumonia vaccines. Current USPSTF guidelines recommend a series of two vaccines for the best pneumonia protection.  
-All adults should have a flu vaccine yearly and an ECG. All adults age 61 and older should receive a shingles vaccine once in their lifetime. -All adults age 38-68 who are overweight should have a diabetes screening test once every three years.  
-Other screening tests & preventive services for persons with diabetes include: an eye exam to screen for diabetic retinopathy, a kidney function test, a foot exam, and stricter control over your cholesterol.  
-Cardiovascular screening for adults with routine risk involves an electrocardiogram (ECG) at intervals determined by the provider.  
-Colorectal cancer screening should be done for adults age 54-65 with no increased risk factors for colorectal cancer. There are a number of acceptable methods of screening for this type of cancer. Each test has its own benefits and drawbacks. Discuss with your provider what is most appropriate for you during your annual wellness visit. The different tests include: colonoscopy (considered the best screening method), a fecal occult blood test, a fecal DNA test, and sigmoidoscopy. 
-All adults born between [facility] should be screened once for Hepatitis C. 
-An Abdominal Aortic Aneurysm (AAA) Screening is recommended for men age 73-68 who has ever smoked in their lifetime. Here is a list of your current Health Maintenance items (your personalized list of preventive services) with a due date: 
Health Maintenance Due Topic Date Due  
 Annual Well Visit  06/06/2018  Flu Vaccine  08/01/2018 Patient Instructions History Introducing [facility] HE[facility]! Dear Izzy Rich: 
Thank you for requesting a [facility] account. Our records indicate that you already have an active [facility] account. You can access your account anytime at [email or web address] Did you know that you can access your hospital and ER discharge instructions at any time in [facility]? You can also review all of your test results from your hospital stay or ER visit. Additional Information If you have questions, please visit the Frequently Asked Questions section of the [email or web address] website at [email or web address] Remember, [email or web address] is NOT to be used for urgent needs. For medical emergencies, dial 911. Now available from your iPhone and Android! Please provide this summary of care documentation to your next provider. Your primary care clinician is listed as Miranda Morales. If you have any questions after today's visit, please call [phone].

## 2018-08-24 NOTE — PROGRESS NOTES
Chief Complaint   Patient presents with    Annual Wellness Visit         This is the Subsequent Medicare Annual Wellness Exam, performed 12 months or more after the Initial AWV or the last Subsequent AWV    I have reviewed the patient's medical history in detail and updated the computerized patient record. History     Past Medical History:   Diagnosis Date    Anxiety disorder     CAD (coronary artery disease) 10/10/2017    STEMI and stents    Depression     Diarrhea     Falls     Headache     Joint pain     Joint pain     Memory disorder     Memory loss     Muscle pain     Muscle pain     Muscle weakness     Muscle weakness     Torn rotator cuff     right side    Vertigo     Visual disturbance     Visual disturbance       Past Surgical History:   Procedure Laterality Date    HX ORTHOPAEDIC  02/2017    rotator cuff repair    HX OTHER SURGICAL  2016    cervical radiculopathy     Current Outpatient Prescriptions   Medication Sig Dispense Refill    gabapentin (NEURONTIN) 600 mg tablet TAKE 1 TABLET(600MG) BY MOUTH IN THE MORNING-1 TABLET(600MG) AT NOON & 1 &1/2 TABLETS(900MG) AT NIGHT 105 Tab 0    rosuvastatin (CRESTOR) 20 mg tablet Take 1 Tab by mouth nightly. 30 Tab 6    clopidogrel (PLAVIX) 75 mg tab TAKE 1 TABLET BY ORAL ROUTE EVERY DAY  5    ARIPiprazole (ABILIFY) 2 mg tablet Take  by mouth daily.  aspirin 81 mg chewable tablet Take 1 Tab by mouth daily. 30 Tab 6    metoprolol tartrate (LOPRESSOR) 25 mg tablet Take 1 Tab by mouth every twelve (12) hours. 60 Tab 6    traZODone (DESYREL) 50 mg tablet Take 25 mg by mouth nightly.  sertraline (ZOLOFT) 100 mg tablet Take 200 mg by mouth daily.  ticagrelor (BRILINTA) 90 mg tablet Take 1 Tab by mouth every twelve (12) hours every twelve (12) hours.  61 Tab 6     No Known Allergies  Family History   Problem Relation Age of Onset    Heart Disease Mother     Stroke Mother      Social History   Substance Use Topics    Smoking status: Never Smoker    Smokeless tobacco: Never Used    Alcohol use No     Patient Active Problem List   Diagnosis Code    Hypovitaminosis D E55.9    Hypercholesteremia E78.00    Anxiety F41.9    Intractable chronic post-traumatic headache G44.321    ADD (attention deficit disorder) F98.8    Chest pain R07.9    ST elevation (STEMI) myocardial infarction involving right coronary artery (HCC) I21.11    Recurrent depression ([address] F33.9       Depression Risk Factor Screening:     PHQ over the last two weeks 12/21/2017   Little interest or pleasure in doing things Not at all   Feeling down, depressed, irritable, or hopeless Not at all   Total Score PHQ 2 0     Alcohol Risk Factor Screening: You do not drink alcohol or very rarely. Functional Ability and Level of Safety:   Hearing Loss  Hearing is good. Activities of Daily Living  The home contains: no safety equipment. Patient does total self care    Fall Risk  Fall Risk Assessment, last 12 mths 10/19/2017   Able to walk? Yes   Fall in past 12 months? No       Abuse Screen  Patient is not abused    Cognitive Screening   Evaluation of Cognitive Function:  Has your family/caregiver stated any concerns about your memory: no  Normal    Patient Care Team   Patient Care Team:  Guicho Dawkins MD as PCP - General (Family Practice)  Jordi James PsyD (Psychology)    Assessment/Plan   Education and counseling provided:  Are appropriate based on today's review and evaluation    Diagnoses and all orders for this visit:    1. Medicare annual wellness visit, initial    2. Screening for alcoholism  -     Annual  Alcohol Screen 15 min ()    3. Screening for depression  -     Depression Screen Annual    4. Screen for colon cancer    5. Hypovitaminosis D  -     VITAMIN D, 25 HYDROXY    6. Hypercholesteremia    7. Screening for diabetes mellitus  -     Glucose, Random ([id number])    8. Screening for ischemic heart disease  -     Lipid Panel ([id number])    9. Special screening for malignant neoplasm of prostate    10. Recurrent depression ([facility] [address]  Assessment & Plan:  Stable, based on history, physical exam and review of pertinent labs, studies and medications; meds reconciled; continue current treatment plan. Key Psychotherapeutic Meds             ARIPiprazole (ABILIFY) 2 mg tablet  (Taking) Take  by mouth daily. traZODone (DESYREL) 50 mg tablet  (Taking) Take 25 mg by mouth nightly. sertraline (ZOLOFT) 100 mg tablet  (Taking) Take 200 mg by mouth daily. Other [address] [facility]             gabapentin (NEURONTIN) 600 mg tablet  (Taking) TAKE 1 TABLET(600MG) BY MOUTH IN THE MORNING-1 TABLET(600MG) AT NOON & 1 &1/2 TABLETS(900MG) AT NIGHT        Lab Results   Component Value Date/Time    Sodium 142 05/18/2018 09:52 AM    Creatinine 0.98 05/18/2018 09:52 AM    WBC 7.9 10/11/2017 02:31 AM    ALT (SGPT) 45 05/18/2018 09:52 AM    AST (SGOT) 32 05/18/2018 09:52 AM         11. ST elevation (STEMI) myocardial infarction involving right coronary artery [facility])  Assessment & Plan: This condition is managed by Specialist.  Key CAD CHF Meds             rosuvastatin (CRESTOR) 20 mg tablet  (Taking) Take 1 Tab by mouth nightly. clopidogrel (PLAVIX) 75 mg tab  (Taking) TAKE 1 TABLET BY ORAL ROUTE EVERY DAY    aspirin 81 mg chewable tablet  (Taking) Take 1 Tab by mouth daily. metoprolol tartrate (LOPRESSOR) 25 mg tablet  (Taking) Take 1 Tab by mouth every twelve (12) hours. ticagrelor (BRILINTA) 90 mg tablet Take 1 Tab by mouth every twelve (12) hours every twelve (12) hours.         [id number](BNP,BNPP,BNPPPOC,HSCRP,NA,NAPOC,K,KPOCT,CHOL,CHOLPOCT,HDL,HDLPOC,LDLCHOL,LDLPOCT,LDLCPOC,LDLC,LDL,LDLCEXT,TRIGL,TGLPOCT,INR,INREXT,INRPOC,PTP,PTINR,PTEXT,DIG)@        Health Maintenance Due   Topic Date Due    MEDICARE YEARLY EXAM  06/06/2018    Influenza Age 5 to Adult  08/01/2018

## 2018-08-25 LAB
25(OH)D3+25(OH)D2 SERPL-MCNC: 20.5 NG/ML (ref 30–100)
CHOLEST SERPL-MCNC: 131 MG/DL (ref 100–199)
GLUCOSE SERPL-MCNC: 121 MG/DL (ref 65–99)
HDLC SERPL-MCNC: 56 MG/DL
LDLC SERPL CALC-MCNC: 58 MG/DL (ref 0–99)
TRIGL SERPL-MCNC: 86 MG/DL (ref 0–149)
VLDLC SERPL CALC-MCNC: 17 MG/DL (ref 5–40)

## 2018-08-27 ENCOUNTER — HOSPITAL ENCOUNTER (OUTPATIENT)
Dept: CARDIAC REHAB | Age: 53
Discharge: HOME | End: 2018-08-27
Payer: MEDICARE

## 2018-08-27 VITALS — BODY MASS INDEX: 31.62 KG/M2 | WEIGHT: 190 LBS

## 2018-08-27 PROCEDURE — 93798 PHYS/QHP OP CAR RHAB W/ECG: CPT

## 2018-08-27 RX ORDER — ERGOCALCIFEROL 1.25 MG/1
50000 CAPSULE ORAL
Qty: 7 CAP | Refills: 0 | Status: SHIPPED | OUTPATIENT
Start: 2018-08-27 | End: 2018-08-29 | Stop reason: SDUPTHER

## 2018-08-29 RX ORDER — ERGOCALCIFEROL 1.25 MG/1
50000 CAPSULE ORAL
Qty: 7 CAP | Refills: 0 | Status: SHIPPED | OUTPATIENT
Start: 2018-08-29 | End: 2019-01-25

## 2018-09-07 ENCOUNTER — HOSPITAL ENCOUNTER (OUTPATIENT)
Dept: CARDIAC REHAB | Age: 53
Discharge: HOME | End: 2018-09-07
Payer: MEDICARE

## 2018-09-07 VITALS — BODY MASS INDEX: 31.28 KG/M2 | WEIGHT: 188 LBS

## 2018-09-07 PROCEDURE — 93798 PHYS/QHP OP CAR RHAB W/ECG: CPT

## 2018-09-17 ENCOUNTER — HOSPITAL ENCOUNTER (OUTPATIENT)
Dept: CARDIAC REHAB | Age: 53
Discharge: HOME | End: 2018-09-17
Payer: MEDICARE

## 2018-09-17 VITALS — WEIGHT: 189 LBS | BODY MASS INDEX: 31.45 KG/M2

## 2018-09-17 PROCEDURE — 93798 PHYS/QHP OP CAR RHAB W/ECG: CPT

## 2018-09-19 ENCOUNTER — HOSPITAL ENCOUNTER (OUTPATIENT)
Dept: CARDIAC REHAB | Age: 53
Discharge: HOME | End: 2018-09-19
Payer: MEDICARE

## 2018-09-19 VITALS — WEIGHT: 192 LBS | BODY MASS INDEX: 31.95 KG/M2

## 2018-09-19 PROCEDURE — 93798 PHYS/QHP OP CAR RHAB W/ECG: CPT

## 2018-09-24 ENCOUNTER — APPOINTMENT (OUTPATIENT)
Dept: CARDIAC REHAB | Age: 53
End: 2018-09-24
Payer: MEDICARE

## 2018-09-24 ENCOUNTER — HOSPITAL ENCOUNTER (OUTPATIENT)
Dept: CARDIAC REHAB | Age: 53
Discharge: HOME | End: 2018-09-24
Payer: MEDICARE

## 2018-09-24 VITALS — WEIGHT: 189 LBS | BODY MASS INDEX: 31.45 KG/M2

## 2018-09-24 PROCEDURE — 93798 PHYS/QHP OP CAR RHAB W/ECG: CPT

## 2018-09-26 ENCOUNTER — APPOINTMENT (OUTPATIENT)
Dept: CARDIAC REHAB | Age: 53
End: 2018-09-26
Payer: MEDICARE

## 2018-09-26 ENCOUNTER — HOSPITAL ENCOUNTER (OUTPATIENT)
Dept: CARDIAC REHAB | Age: 53
Discharge: HOME | End: 2018-09-26
Payer: MEDICARE

## 2018-09-26 ENCOUNTER — HOSPITAL ENCOUNTER (OUTPATIENT)
Dept: CARDIAC REHAB | Age: 53
End: 2018-09-26
Payer: MEDICARE

## 2018-09-26 VITALS — WEIGHT: 183 LBS | BODY MASS INDEX: 30.45 KG/M2

## 2018-09-26 PROCEDURE — 93798 PHYS/QHP OP CAR RHAB W/ECG: CPT

## 2018-10-01 ENCOUNTER — HOSPITAL ENCOUNTER (OUTPATIENT)
Dept: CARDIAC REHAB | Age: 53
Discharge: HOME | End: 2018-10-01
Payer: MEDICARE

## 2018-10-01 VITALS — WEIGHT: 186 LBS | BODY MASS INDEX: 30.95 KG/M2

## 2018-10-01 PROCEDURE — 93798 PHYS/QHP OP CAR RHAB W/ECG: CPT

## 2018-10-01 NOTE — CARDIO/PULMONARY
Ilana Myers Completed phase II cardiac rehab and attended 36 sessions from 11/02/17 - 10/01/18. Ilana Myers is interested in maintaining optimal health and will work with Dr. Marjan Patel. Ilana Myers has improved his endurance and stamina through regular exercise, lost 2 lbs and 1/4 inch from his waist. Blood pressure is 122/70 and is WNL. He has also improved his nutrition, [employer] and depression scores and these were reviewed with patient. Ilana Myers plans to continue exercising at home and the gym and has set revised goals that include using cardio equipment, light weights, playing basketball and walking, 3 to 5 times a week for 45 to 60 minutes. Beto Jenkins RN 
10/1/2018

## 2018-11-01 ENCOUNTER — TELEPHONE (OUTPATIENT)
Dept: NEUROLOGY | Age: 53
End: 2018-11-01

## 2018-11-02 NOTE — TELEPHONE ENCOUNTER
Per Dr Lisa Noel: No restrictions, letter printed. FAxed to [facility] as requested.

## 2018-11-23 ENCOUNTER — OFFICE VISIT (OUTPATIENT)
Dept: INTERNAL MEDICINE CLINIC | Age: 53
End: 2018-11-23

## 2018-11-23 VITALS
HEART RATE: 56 BPM | DIASTOLIC BLOOD PRESSURE: 86 MMHG | TEMPERATURE: 97.8 F | WEIGHT: 191.2 LBS | OXYGEN SATURATION: 95 % | BODY MASS INDEX: 31.86 KG/M2 | RESPIRATION RATE: 16 BRPM | HEIGHT: 65 IN | SYSTOLIC BLOOD PRESSURE: 125 MMHG

## 2018-11-23 DIAGNOSIS — E55.9 HYPOVITAMINOSIS D: Primary | ICD-10-CM

## 2018-11-23 DIAGNOSIS — R73.9 ELEVATED BLOOD SUGAR: ICD-10-CM

## 2018-11-23 NOTE — PROGRESS NOTES
Chief Complaint   Patient presents with    Cholesterol Problem     Pt who presents for follow up of a pre-existing problem of hypertension and hyperlipidemia. Diet and Lifestyle: not attempting to follow a low fat, low cholesterol diet, not attempting to follow a low sodium diet, exercises regularly, nonsmoker  Home BP Monitoring: is not measured at home  Use of agents associated with hypertension: none. Cardiovascular ROS: taking medications as instructed, no medication side effects noted, no TIA's, no chest pain on exertion, no dyspnea on exertion, no swelling of ankles. New concerns: none. Reviewed and agree with Nurse Note and duplicated in this note. Reviewed PmHx, RxHx, FmHx, SocHx, AllgHx and updated and dated in the chart.     Family History   Problem Relation Age of Onset    Heart Disease Mother     Stroke Mother        Past Medical History:   Diagnosis Date    Anxiety disorder     CAD (coronary artery disease) 10/10/2017    STEMI and stents    Depression     Diarrhea     Falls     Headache     Joint pain     Joint pain     Memory disorder     Memory loss     Muscle pain     Muscle pain     Muscle weakness     Muscle weakness     Torn rotator cuff     right side    Vertigo     Visual disturbance     Visual disturbance       Social History     Socioeconomic History    Marital status:      Spouse name: Not on file    Number of children: Not on file    Years of education: Not on file    Highest education level: Not on file   Tobacco Use    Smoking status: Never Smoker    Smokeless tobacco: Never Used   Substance and Sexual Activity    Alcohol use: No    Drug use: No    Sexual activity: Yes        Review of Systems - negative except as listed above      Objective:     Vitals:    11/23/18 0919   BP: 125/86   Pulse: (!) 56   Resp: 16   Temp: 97.8 °F (36.6 °C)   TempSrc: Oral   SpO2: 95%   Weight: 191 lb 3.2 oz (86.7 kg)   Height: 5' 5\" (1.651 m)       Physical Examination: General appearance - alert, well appearing, and in no distress  Eyes - pupils equal and reactive, extraocular eye movements intact  Ears - bilateral TM's and external ear canals normal  Nose - normal and patent, no erythema, discharge or polyps  Mouth - mucous membranes moist, pharynx normal without lesions  Neck - supple, no significant adenopathy  Chest - clear to auscultation, no wheezes, rales or rhonchi, symmetric air entry  Heart - normal rate, regular rhythm, normal S1, S2, no murmurs, rubs, clicks or gallops  Abdomen - soft, nontender, nondistended, no masses or organomegaly  Extremities - peripheral pulses normal, no pedal edema, no clubbing or cyanosis  Skin - normal coloration and turgor, no rashes, no suspicious skin lesions noted    Assessment/ Plan:   Diagnoses and all orders for this visit:    1. Hypovitaminosis D  -     VITAMIN D, 25 HYDROXY    2. Elevated BP without diagnosis of hypertension    3. Elevated blood sugar  -     HEMOGLOBIN A1C WITH EAG       Follow-up Disposition: Not on File  Discussed the patient's I have reviewed/discussed the above normal BMI with the patient. I have recommended the following interventions: dietary management education, guidance, and counseling . BMI with him. I have recommended the following interventions: dietary management education, guidance, and counseling . Medication Side Effects and Warnings were discussed with patient,  Patient Labs were reviewed and or requested, and  Patient Past Records were reviewed and or requested  yes       I have discussed the diagnosis with the patient and the intended plan as seen in the above orders. The patient has received an after-visit summary and questions were answered concerning future plans.      Medication Side Effects and Warnings were discussed with patient,  Patient Labs were reviewed and or requested, and  Patient Past Records were reviewed and or requested  yes        Pt agrees to call or return to clinic and/or go to closest ER with any worsening of symptoms. This may include, but not limited to increased fever (>100.4) with NSAIDS or Tylenol, increased edema, confusion, rash, worsening of presenting symptoms.

## 2018-11-23 NOTE — PATIENT INSTRUCTIONS
Learning About High Blood Sugar  What is high blood sugar? Your body turns the food you eat into glucose (sugar), which it uses for energy. But if your body isn't able to use the sugar right away, it can build up in your blood and lead to high blood sugar. When the amount of sugar in your blood stays too high for too much of the time, you may have diabetes. Diabetes is a disease that can cause serious health problems. The good news is that lifestyle changes may help you get your blood sugar back to normal and avoid or delay diabetes. What causes high blood sugar? Sugar (glucose) can build up in your blood if you:  · Are overweight. · Have a family history of diabetes. · Take certain medicines, such as steroids. What are the symptoms? Having high blood sugar may not cause any symptoms at all. Or it may make you feel very thirsty or very hungry. You may also urinate more often than usual, have blurry vision, or lose weight without trying. How is high blood sugar treated? You can take steps to lower your blood sugar level if you understand what makes it get higher. Your doctor may want you to learn how to test your blood sugar level at home. Then you can see how illness, stress, or different kinds of food or medicine raise or lower your blood sugar level. Other tests may be needed to see if you have diabetes. How can you prevent high blood sugar? · Watch your weight. If you're overweight, losing just a small amount of weight may help. Reducing fat around your waist is most important. · Limit the amount of calories, sweets, and unhealthy fat you eat. Ask your doctor if a dietitian can help you. A registered dietitian can help you create meal plans that fit your lifestyle. · Get at least 30 minutes of exercise on most days of the week. Exercise helps control your blood sugar. It also helps you maintain a healthy weight. Walking is a good choice.  You also may want to do other activities, such as running, swimming, cycling, or playing tennis or team sports. · If your doctor prescribed medicines, take them exactly as prescribed. Call your doctor if you think you are having a problem with your medicine. You will get more details on the specific medicines your doctor prescribes. Follow-up care is a key part of your treatment and safety. Be sure to make and go to all appointments, and call your doctor if you are having problems. It's also a good idea to know your test results and keep a list of the medicines you take. Where can you learn more? Go to http://paulie-nestor.info/. Enter O108 in the search box to learn more about \"Learning About High Blood Sugar. \"  Current as of: December 7, 2017  Content Version: 11.8  © 7594-3375 [employer], Incorporated. Care instructions adapted under license by [employer] (which disclaims liability or warranty for this information). If you have questions about a medical condition or this instruction, always ask your healthcare professional. [address] any warranty or liability for your use of this information.

## 2018-11-24 LAB
25(OH)D3+25(OH)D2 SERPL-MCNC: 30.9 NG/ML (ref 30–100)
EST. AVERAGE GLUCOSE BLD GHB EST-MCNC: 123 MG/DL
HBA1C MFR BLD: 5.9 % (ref 4.8–5.6)

## 2018-12-12 DIAGNOSIS — G44.321 INTRACTABLE CHRONIC POST-TRAUMATIC HEADACHE: ICD-10-CM

## 2018-12-12 DIAGNOSIS — G43.709 CHRONIC MIGRAINE WITHOUT AURA WITHOUT STATUS MIGRAINOSUS, NOT INTRACTABLE: ICD-10-CM

## 2018-12-13 RX ORDER — GABAPENTIN 600 MG/1
TABLET ORAL
Qty: 105 TAB | Refills: 0 | Status: SHIPPED | OUTPATIENT
Start: 2018-12-13 | End: 2019-01-09 | Stop reason: SDUPTHER

## 2019-01-25 ENCOUNTER — OFFICE VISIT (OUTPATIENT)
Dept: NEUROLOGY | Age: 54
End: 2019-01-25

## 2019-01-25 VITALS
WEIGHT: 187 LBS | RESPIRATION RATE: 18 BRPM | DIASTOLIC BLOOD PRESSURE: 86 MMHG | BODY MASS INDEX: 31.16 KG/M2 | SYSTOLIC BLOOD PRESSURE: 124 MMHG | OXYGEN SATURATION: 98 % | HEIGHT: 65 IN | HEART RATE: 56 BPM

## 2019-01-25 DIAGNOSIS — R51.9 TEMPORAL PAIN: ICD-10-CM

## 2019-01-25 DIAGNOSIS — R51.9 TEMPORAL PAIN: Primary | ICD-10-CM

## 2019-01-25 DIAGNOSIS — G47.00 INSOMNIA, UNSPECIFIED TYPE: ICD-10-CM
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
## 2019-01-25 NOTE — PATIENT INSTRUCTIONS
You can take melatonin 1-3 mg at bedtime to help you sleep and headaches, you can purchase this in the vitamin section at the drug store     A Healthy Lifestyle: Care Instructions  Your Care Instructions    A healthy lifestyle can help you feel good, stay at a healthy weight, and have plenty of energy for both work and play. A healthy lifestyle is something you can share with your whole family. A healthy lifestyle also can lower your risk for serious health problems, such as high blood pressure, heart disease, and diabetes. You can follow a few steps listed below to improve your health and the health of your family. Follow-up care is a key part of your treatment and safety. Be sure to make and go to all appointments, and call your doctor if you are having problems. It's also a good idea to know your test results and keep a list of the medicines you take. How can you care for yourself at home? · Do not eat too much sugar, fat, or fast foods. You can still have dessert and treats now and then. The goal is moderation. · Start small to improve your eating habits. Pay attention to portion sizes, drink less juice and soda pop, and eat more fruits and vegetables. ? Eat a healthy amount of food. A 3-ounce serving of meat, for example, is about the size of a deck of cards. Fill the rest of your plate with vegetables and whole grains. ? Limit the amount of soda and sports drinks you have every day. Drink more water when you are thirsty. ? Eat at least 5 servings of fruits and vegetables every day. It may seem like a lot, but it is not hard to reach this goal. A serving or helping is 1 piece of fruit, 1 cup of vegetables, or 2 cups of leafy, raw vegetables. Have an apple or some carrot sticks as an afternoon snack instead of a candy bar. Try to have fruits and/or vegetables at every meal.  · Make exercise part of your daily routine.  You may want to start with simple activities, such as walking, bicycling, or slow swimming. Try to be active 30 to 60 minutes every day. You do not need to do all 30 to 60 minutes all at once. For example, you can exercise 3 times a day for 10 or 20 minutes. Moderate exercise is safe for most people, but it is always a good idea to talk to your doctor before starting an exercise program.  · Keep moving. Alanis Boys the lawn, work in the garden, or [employer]. Take the stairs instead of the elevator at work. · If you smoke, quit. People who smoke have an increased risk for heart attack, stroke, cancer, and other lung illnesses. Quitting is hard, but there are ways to boost your chance of quitting tobacco for good. ? Use nicotine gum, patches, or lozenges. ? Ask your doctor about stop-smoking programs and medicines. ? Keep trying. In addition to reducing your risk of diseases in the future, you will notice some benefits soon after you stop using tobacco. If you have shortness of breath or asthma symptoms, they will likely get better within a few weeks after you quit. · Limit how much alcohol you drink. Moderate amounts of alcohol (up to 2 drinks a day for men, 1 drink a day for women) are okay. But drinking too much can lead to liver problems, high blood pressure, and other health problems. Family health  If you have a family, there are many things you can do together to improve your health. · Eat meals together as a family as often as possible. · Eat healthy foods. This includes fruits, vegetables, lean meats and dairy, and whole grains. · Include your family in your fitness plan. Most people think of activities such as jogging or tennis as the way to fitness, but there are many ways you and your family can be more active. Anything that makes you breathe hard and gets your heart pumping is exercise. Here are some tips:  ? Walk to do errands or to take your child to school or the bus.  ? Go for a family bike ride after dinner instead of watching TV. Where can you learn more?   Go to http://paulie-nestor.info/. Enter M635 in the search box to learn more about \"A Healthy Lifestyle: Care Instructions. \"  Current as of: September 11, 2018  Content Version: 11.9  © 4655-2965 [employer]. Care instructions adapted under license by [employer] (which disclaims liability or warranty for this information). If you have questions about a medical condition or this instruction, always ask your healthcare professional. [place] [address] any warranty or liability for your use of this information.

## 2019-01-29 LAB
CRP SERPL-MCNC: <0.3 MG/L (ref 0–4.9)
ERYTHROCYTE [SEDIMENTATION RATE] IN BLOOD BY WESTERGREN METHOD: 5 MM/HR (ref 0–30)

## 2019-02-11 ENCOUNTER — TELEPHONE (OUTPATIENT)
Dept: NEUROLOGY | Age: 54
End: 2019-02-11

## 2019-02-11 NOTE — TELEPHONE ENCOUNTER
calling to schedule consult via phone.     Best call back # [place] - [phone]

## 2019-02-22 ENCOUNTER — OFFICE VISIT (OUTPATIENT)
Dept: INTERNAL MEDICINE CLINIC | Age: 54
End: 2019-02-22

## 2019-02-22 VITALS
OXYGEN SATURATION: 96 % | WEIGHT: 194.4 LBS | SYSTOLIC BLOOD PRESSURE: 128 MMHG | TEMPERATURE: 98.2 F | HEIGHT: 65 IN | HEART RATE: 51 BPM | BODY MASS INDEX: 32.39 KG/M2 | DIASTOLIC BLOOD PRESSURE: 86 MMHG | RESPIRATION RATE: 16 BRPM

## 2019-02-22 DIAGNOSIS — F33.9 RECURRENT DEPRESSION (HCC): ICD-10-CM

## 2019-02-22 DIAGNOSIS — I10 BENIGN ESSENTIAL HTN: ICD-10-CM

## 2019-02-22 DIAGNOSIS — I21.11 ST ELEVATION (STEMI) MYOCARDIAL INFARCTION INVOLVING RIGHT CORONARY ARTERY (HCC): ICD-10-CM

## 2019-02-22 DIAGNOSIS — R73.9 ELEVATED BLOOD SUGAR: Primary | ICD-10-CM

## 2019-02-22 NOTE — PATIENT INSTRUCTIONS
DASH diet: Healthy eating to lower your blood pressure The DASH diet emphasizes portion size, eating a variety of foods and getting the right amount of nutrients. Discover how DASH can improve your health and lower your blood pressure. DASH stands for Dietary Approaches to Stop Hypertension. The DASH diet is a lifelong approach to healthy eating that's designed to help treat or prevent high blood pressure (hypertension). The DASH diet encourages you to reduce the sodium in your diet and eat a variety of foods rich in nutrients that help lower blood pressure, such as potassium, calcium and magnesium. By following the DASH diet, you may be able to reduce your blood pressure by a few points in just two weeks. Over time, your blood pressure could drop by eight to 14 points, which can make a significant difference in your health risks. Because the DASH diet is a healthy way of eating, it offers health benefits besides just lowering blood pressure. The DASH diet may offer protection against osteoporosis, cancer, heart disease, stroke and diabetes. And while the DASH diet is not a weight-loss program, you may indeed lose unwanted pounds because it can help guide you toward healthier meals and snacks. DASH diet: Sodium levels A key goal of the DASH diet is reducing how much sodium you eat, since sodium can dramatically increase blood pressure in people who are sensitive to its effects. In addition to the standard DASH diet, there is also a lower sodium version of the diet. You can choose the version of the diet that meets your health needs:  
Standard DASH diet. You can consume up to 2,300 milligrams (mg) of sodium a day. Lower sodium DASH diet. You can consume up to 1,500 mg of sodium a day. Both versions of the DASH diet aim to reduce the amount of sodium in your diet compared with what you might get in a more traditional diet, which can amount to a whopping 3,500 mg of sodium a day or more.  That level is far beyond the recommendation of the 2005 Dietary Guidelines for Americans of a maximum of 2,300 mg of sodium a day. Studies show that the lower sodium version of the DASH diet is especially helpful in lowering blood pressure for adults who are middle-aged or older, for -Americans and for those who already have high blood pressure. If you aren't sure which version of the DASH diet is best for you, talk to your doctor. DASH diet: What to eat Both sodium versions of the DASH diet include lots of whole grains, fruits, vegetables and low-fat dairy products. The DASH diet also includes some fish, poultry and legumes. You can eat red meat, sweets and fats in small amounts. The DASH diet is low in saturated fat, cholesterol and total fat. Here's a look at the recommended servings from each food group for the 2,[id number]-day DASH diet. Grains (6 to 8 servings a day) Grains include bread, cereal, rice and pasta. Examples of one serving of grains include 1 slice whole-wheat bread, 1 ounce (oz.) dry cereal, or 1/2 cup cooked cereal, rice or pasta. Focus on whole grains because they have more fiber and nutrients than do refined grains. For instance, use brown rice instead of white rice, whole-wheat pasta instead of regular pasta and whole-grain bread instead of white bread. Look for products labeled \"100 percent whole grain\" or \"100 percent whole wheat. \" Grains are naturally low in fat, so avoid spreading on butter or adding cream and cheese sauces. Vegetables (4 to 5 servings a day) Tomatoes, carrots, broccoli, sweet potatoes, greens and other vegetables are full of fiber, vitamins, and such minerals as potassium and magnesium. Examples of one serving include 1 cup raw leafy green vegetables or 1/2 cup cut-up raw or cooked vegetables.   
Don't think of vegetables only as side dishes  a hearty blend of vegetables served over brown rice or whole-wheat noodles can serve as the main dish for a meal.  
 Fresh or frozen vegetables are both good choices. When buying frozen and canned vegetables, choose those labeled as low sodium or without added salt. To increase the number of servings you fit in daily, be creative. In a stir-gomez, for instance, cut the amount of meat in half and double up on the vegetables. Fruits (4 to 5 servings a day) Many fruits need little preparation to become a healthy part of a meal or snack. Like vegetables, they're packed with fiber, potassium and magnesium and are typically low in fat  exceptions include avocados and coconuts. Examples of one serving include 1 medium fruit or 1/2 cup fresh, frozen or canned fruit. Have a piece of fruit with meals and one as a snack, then round out your day with a dessert of fresh fruits topped with a splash of low-fat yogurt. Leave on edible peels whenever possible. The peels of apples, pears and most fruits with pits add interesting texture to recipes and contain healthy nutrients and fiber. Remember that citrus fruits and juice, such as grapefruit, can interact with certain medications, so check with your doctor or pharmacist to see if they're OK for you. Dairy (2 to 3 servings a day) Milk, yogurt, cheese and other dairy products are major sources of calcium, vitamin D and protein. But the key is to make sure that you choose dairy products that are low-fat or fat-free because otherwise they can be a major source of fat. Examples of one serving include 1 cup skim or 1% milk, 1 cup yogurt or 1 1/2 oz. cheese. Low-fat or fat-free frozen yogurt can help you boost the amount of dairy products you eat while offering a sweet treat. Add fruit for a healthy twist.  
If you have trouble digesting dairy products, choose lactose-free products or consider taking an over-the-counter product that contains the enzyme lactase, which can reduce or prevent the symptoms of lactose intolerance. Go easy on regular and even fat-free cheeses because they are typically high in sodium. Lean meat, poultry and fish (6 or fewer servings a day) Meat can be a rich source of protein, B vitamins, iron and zinc. But because even lean varieties contain fat and cholesterol, don't make them a mainstay of your diet  cut back typical meat portions by one-third or one-half and pile on the vegetables instead. Examples of one serving include 1 oz. cooked skinless poultry, seafood or lean meat, 1 egg, or 1 oz. water-packed, no-salt-added canned tuna. Trim away skin and fat from meat and then broil, grill, roast or poach instead of frying. Eat heart-healthy fish, such as salmon, herring and tuna. These types of fish are high in omega-3 fatty acids, which can help lower your total cholesterol. Nuts, seeds and legumes (4 to 5 servings a week) Almonds, sunflower seeds, kidney beans, peas, lentils and other foods in this family are good sources of magnesium, potassium and protein. They're also full of fiber and phytochemicals, which are plant compounds that may protect against some cancers and cardiovascular disease. Serving sizes are small and are intended to be consumed weekly because these foods are high in calories. Examples of one serving include 1/3 cup (1 1/2 oz.) nuts, 2 tablespoons seeds or 1/2 cup cooked beans or peas. Nuts sometimes get a bad rap because of their fat content, but they contain healthy types of fat  monounsaturated fat and omega-3 fatty acids. They're high in calories, however, so eat them in moderation. Try adding them to stir-fries, salads or cereals. Soybean-based products, such as tofu and tempeh, can be a good alternative to meat because they contain all of the amino acids your body needs to make a complete protein, just like meat. They also contain isoflavones, a type of natural plant compound (phytochemical) that has been shown to have some health benefits. Fats and oils (2 to 3 servings a day) Fat helps your body absorb essential vitamins and helps your body's immune system. But too much fat increases your risk of heart disease, diabetes and obesity. The DASH diet strives for a healthy balance by providing 30 percent or less of daily calories from fat, with a focus on the healthier unsaturated fats. Examples of one serving include 1 teaspoon soft margarine, 1 tablespoon low-fat mayonnaise or 2 tablespoons light salad dressing. Saturated fat and trans fat are the main dietary culprits in raising your blood cholesterol and increasing your risk of coronary artery disease. DASH helps keep your daily saturated fat to less than 10 percent of your total calories by limiting use of meat, butter, cheese, whole milk, cream and eggs in your diet, along with foods made from lard, solid shortenings, and palm and coconut oils. Avoid trans fat, commonly found in such processed foods as crackers, baked goods and fried items. Read food labels on margarine and salad dressing so that you can choose those that are lowest in saturated fat and free of trans fat. Sweets (5 or fewer a week) You don't have to banish sweets entirely while following the DASH diet  just go easy on them. Examples of one serving include 1 tablespoon sugar, jelly or jam, 1/2 cup sorbet or 1 cup (8 oz.) lemonade. When you eat sweets, choose those that are fat-free or low-fat, such as sorbets, fruit ices, jelly beans, hard candy, chava crackers or low-fat cookies. Artificial sweeteners such as aspartame (NutraSweet, Equal) and sucralose (Splenda) may help satisfy your sweet tooth while sparing the sugar. But remember that you still must use them sensibly. It's OK to swap a diet cola for a regular cola, but not in place of a more nutritious beverage such as low-fat milk or even plain water. Cut back on added sugar, which has no nutritional value but can pack on calories. DASH diet: Alcohol and caffeine Drinking too much alcohol can increase blood pressure. The DASH diet recommends that men limit alcohol to two or fewer drinks a day and women one or less. The DASH diet doesn't address caffeine consumption. The influence of caffeine on blood pressure remains unclear. But caffeine can cause your blood pressure to rise at least temporarily. If you already have high blood pressure or if you think caffeine is affecting your blood pressure, talk to your doctor about your caffeine consumption. DASH diet and weight loss The DASH diet is not designed to promote weight loss, but it can be used as part of an overall weight-loss strategy. The DASH diet is based on a diet of about 2,000 calories a day. If you're trying to lose weight, though, you may want to eat around 1,600 a day. You may need to adjust your serving goals based on your health or individual circumstances  something your health care team can help you decide. Tips to cut back on sodium The foods at the core of the DASH diet are naturally low in sodium. So just by following the DASH diet, you're likely to reduce your sodium intake. You also can cut back on sodium in your diet by:  
Using sodium-free spices or flavorings with your food instead of salt Not adding salt when cooking rice, pasta or hot cereal  
Rinsing canned foods to remove some of the sodium Buying foods labeled \"no salt added,\" \"sodium-free,\" \"low sodium\" or \"very low sodium\" One teaspoon of table salt has about 2,300 mg of sodium, and 2/3 teaspoon of table salt has about 1,500 mg of sodium. When you read food labels, you may be surprised at just how much sodium some processed foods contain. Even low-fat soups, canned vegetables, ready-to-eat cereals and sliced turkey from the local deli  all foods you may have considered healthy  often have lots of sodium.   
You may not notice a difference in taste when you choose low-sodium food and beverages. If things seem too bland, gradually introduce low-sodium foods and cut back on table salt until you reach your sodium goal. That'll give your palate time to adjust. It can take several weeks for your taste buds to get used to less salty foods. Putting the pieces of the DASH diet together Try these strategies to get started on the DASH diet:  
Change gradually. To boost your success, avoid dramatic changes in your eating approach. Instead, change one or two things at a time. If you now eat only one or two servings of fruits or vegetables a day, try to add a serving at lunch and one at dinner. Rather than switching to all whole grains, start by making one or two of your grain servings whole grains. Increasing fruits, vegetables and whole grains gradually can also help prevent bloating or diarrhea that may occur if you aren't used to eating a diet with lots of fiber. You can also try over-the-counter products to help reduce gas from beans and vegetables. Forgive yourself if you backslide. Everyone slips, especially when learning something new. Remember that changing your lifestyle is a long-term process. Find out what triggered your setback and then just  where you left off with the DASH diet. Reward successes. Reward yourself with a nonfood treat for your accomplishments. Add physical activity. To boost your blood pressure lowering efforts even more, consider increasing your physical activity in addition to following the DASH diet. Combining both the DASH diet and physical activity makes it more likely that you'll reduce your blood pressure. Get support if you need it. If you're having trouble sticking to your diet, talk to your doctor or dietitian about it. You might get some tips that will help you stick to the DASH diet. Remember, healthy eating isn't an all-or-nothing proposition.  What's most important is that, on average, you eat healthier foods with plenty of variety  both to keep your diet nutritious and to avoid boredom or extremes. And with the DASH diet, you can have both.

## 2019-02-22 NOTE — ASSESSMENT & PLAN NOTE
Stable, based on history, physical exam and review of pertinent labs, studies and medications; meds reconciled; continue current treatment plan. Key Psychotherapeutic Meds ARIPiprazole (ABILIFY) 2 mg tablet  (Taking) Take  by mouth daily. traZODone (DESYREL) 50 mg tablet  (Taking) Take 50 mg by mouth nightly. sertraline (ZOLOFT) 100 mg tablet  (Taking) Take 100 mg by mouth daily. Other [address] Meds   
    
  
 gabapentin (NEURONTIN) 600 mg tablet  (Taking) TAKE ONE(1) TABLET BY MOUTH EACH MORNING AND AT NOON AND 1 AND 1/2 TABS (=900MG) AT NIGHT Lab Results Component Value Date/Time  Sodium 142 05/18/2018 09:52 AM  
 Creatinine 0.98 05/18/2018 09:52 AM  
 WBC 7.9 10/11/2017 02:31 AM  
 ALT (SGPT) 45 05/18/2018 09:52 AM  
 AST (SGOT) 32 05/18/2018 09:52 AM

## 2019-02-22 NOTE — PROGRESS NOTES
Chief Complaint Patient presents with  Hypertension Pt who presents for follow up of a pre-existing problem of hypertension. Diet and Lifestyle: not attempting to follow a low fat, low cholesterol diet, not attempting to follow a low sodium diet, sedentary, nonsmoker Home BP Monitoring: is not measured at home Use of agents associated with hypertension: none. Cardiovascular ROS: taking medications as instructed, no medication side effects noted, no TIA's, no chest pain on exertion, no dyspnea on exertion, no swelling of ankles. New concerns: none. Reviewed and agree with Nurse Note and duplicated in this note. Reviewed PmHx, RxHx, FmHx, SocHx, AllgHx and updated and dated in the chart. Family History Problem Relation Age of Onset  Heart Disease Mother  Stroke Mother Past Medical History:  
Diagnosis Date  Anxiety disorder  CAD (coronary artery disease) 10/10/2017 STEMI and stents  Depression  Diarrhea [address]  Headache  Joint pain  Joint pain  Memory disorder  Memory loss  Muscle pain  Muscle pain  Muscle weakness  Muscle weakness  Torn rotator cuff   
 right side  Vertigo  Visual disturbance  Visual disturbance Social History Socioeconomic History  Marital status:  Spouse name: Not on file  Number of children: Not on file  Years of education: Not on file  Highest education level: Not on file Tobacco Use  Smoking status: Never Smoker  Smokeless tobacco: Never Used Substance and Sexual Activity  Alcohol use: No  
 Drug use: No  
 Sexual activity: Yes Review of Systems - negative except as listed above Objective:  
 
Vitals:  
 02/22/19 0944 BP: 128/86 Pulse: (!) 51 Resp: 16 Temp: 98.2 °F (36.8 °C) TempSrc: Oral  
SpO2: 96% Weight: 194 lb 6.4 oz (88.2 kg) Height: 5' 5\" (1.651 m) Physical Examination: General appearance - alert, well appearing, and in no distress Eyes - pupils equal and reactive, extraocular eye movements intact Ears - bilateral TM's and external ear canals normal 
Nose - normal and patent, no erythema, discharge or polyps Mouth - mucous membranes moist, pharynx normal without lesions Neck - supple, no significant adenopathy Chest - clear to auscultation, no wheezes, rales or rhonchi, symmetric air entry Heart - normal rate, regular rhythm, normal S1, S2, no murmurs, rubs, clicks or gallops Abdomen - soft, nontender, nondistended, no masses or organomegaly Back exam - full range of motion, no tenderness, palpable spasm or pain on motion Neurological - alert, oriented, normal speech, no focal findings or movement disorder noted Musculoskeletal - no joint tenderness, deformity or swelling Extremities - peripheral pulses normal, no pedal edema, no clubbing or cyanosis Skin - normal coloration and turgor, no rashes, no suspicious skin lesions noted Assessment/ Plan:  
Diagnoses and all orders for this visit: 1. Elevated blood sugar 
-     HEMOGLOBIN A1C WITH EAG 2. Benign essential HTN 
-     METABOLIC PANEL, BASIC Blood pressure stable we will increase his visits to every 6 months as long as his hemoglobin A1c is normal or prediabetes Follow-up Disposition: Not on File I have reviewed/discussed the above normal BMI with the patient. I have recommended the following interventions: dietary management education, guidance, and counseling . Medication Side Effects and Warnings were discussed with patient, Patient Labs were reviewed and or requested, and 
Patient Past Records were reviewed and or requested  yes I have discussed the diagnosis with the patient and the intended plan as seen in the above orders. The patient has received an after-visit summary and questions were answered concerning future plans. Pt agrees to call or return to clinic and/or go to closest ER with any worsening of symptoms. This may include, but not limited to increased fever (>100.4) with NSAIDS or Tylenol, increased edema, confusion, rash, worsening of presenting symptoms.

## 2019-02-22 NOTE — ASSESSMENT & PLAN NOTE
Stable, based on history, physical exam and review of pertinent labs, studies and medications; meds reconciled; continue current treatment plan. Key CAD CHF Meds   
    
  
 rosuvastatin (CRESTOR) 20 mg tablet  (Taking) Take 1 Tab by mouth nightly. clopidogrel (PLAVIX) 75 mg tab  (Taking) TAKE 1 TABLET BY ORAL ROUTE EVERY DAY  
 aspirin 81 mg chewable tablet  (Taking) Take 1 Tab by mouth daily. metoprolol tartrate (LOPRESSOR) 25 mg tablet  (Taking) Take 1 Tab by mouth every twelve (12) hours. Lab Results Component Value Date/Time  Sodium 142 05/18/2018 09:52 AM  
 Potassium 4.1 05/18/2018 09:52 AM  
 Cholesterol, total 131 08/24/2018 09:00 AM  
 HDL Cholesterol 56 08/24/2018 09:00 AM  
 LDL, calculated 58 08/24/2018 09:00 AM  
 Triglyceride 86 08/24/2018 09:00 AM

## 2019-02-23 LAB
BUN SERPL-MCNC: 12 MG/DL (ref 6–24)
BUN/CREAT SERPL: 11 (ref 9–20)
CALCIUM SERPL-MCNC: 9.4 MG/DL (ref 8.7–10.2)
CHLORIDE SERPL-SCNC: 104 MMOL/L (ref 96–106)
CO2 SERPL-SCNC: 25 MMOL/L (ref 20–29)
CREAT SERPL-MCNC: 1.11 MG/DL (ref 0.76–1.27)
EST. AVERAGE GLUCOSE BLD GHB EST-MCNC: 126 MG/DL
GLUCOSE SERPL-MCNC: 87 MG/DL (ref 65–99)
HBA1C MFR BLD: 6 % (ref 4.8–5.6)
POTASSIUM SERPL-SCNC: 4.3 MMOL/L (ref 3.5–5.2)
SODIUM SERPL-SCNC: 143 MMOL/L (ref 134–144)

## 2019-02-28 RX ORDER — ROSUVASTATIN CALCIUM 20 MG/1
TABLET, COATED ORAL
Qty: 30 TAB | Refills: 6 | Status: SHIPPED | OUTPATIENT
Start: 2019-02-28 | End: 2020-04-09 | Stop reason: SDUPTHER

## 2019-03-07 ENCOUNTER — OFFICE VISIT (OUTPATIENT)
Dept: NEUROLOGY | Age: 54
End: 2019-03-07

## 2019-03-07 VITALS
OXYGEN SATURATION: 98 % | WEIGHT: 198 LBS | HEART RATE: 61 BPM | SYSTOLIC BLOOD PRESSURE: 140 MMHG | DIASTOLIC BLOOD PRESSURE: 90 MMHG | RESPIRATION RATE: 16 BRPM | HEIGHT: 65 IN | BODY MASS INDEX: 32.99 KG/M2

## 2019-03-07 DIAGNOSIS — G43.709 CHRONIC MIGRAINE WITHOUT AURA WITHOUT STATUS MIGRAINOSUS, NOT INTRACTABLE: ICD-10-CM

## 2019-03-07 DIAGNOSIS — G44.321 INTRACTABLE CHRONIC POST-TRAUMATIC HEADACHE: ICD-10-CM

## 2019-03-07 RX ORDER — GABAPENTIN 600 MG/1
600 TABLET ORAL 3 TIMES DAILY
Qty: 105 TAB | Refills: 2 | Status: SHIPPED | OUTPATIENT
Start: 2019-03-07 | End: 2019-03-07 | Stop reason: SDUPTHER

## 2019-03-07 RX ORDER — GABAPENTIN 600 MG/1
600 TABLET ORAL 3 TIMES DAILY
Qty: 270 TAB | Refills: 0 | Status: SHIPPED | OUTPATIENT
Start: 2019-03-07 | End: 2019-05-28 | Stop reason: SDUPTHER

## 2019-03-07 NOTE — PATIENT INSTRUCTIONS
A Healthy Lifestyle: Care Instructions  Your Care Instructions    A healthy lifestyle can help you feel good, stay at a healthy weight, and have plenty of energy for both work and play. A healthy lifestyle is something you can share with your whole family. A healthy lifestyle also can lower your risk for serious health problems, such as high blood pressure, heart disease, and diabetes. You can follow a few steps listed below to improve your health and the health of your family. Follow-up care is a key part of your treatment and safety. Be sure to make and go to all appointments, and call your doctor if you are having problems. It's also a good idea to know your test results and keep a list of the medicines you take. How can you care for yourself at home? · Do not eat too much sugar, fat, or fast foods. You can still have dessert and treats now and then. The goal is moderation. · Start small to improve your eating habits. Pay attention to portion sizes, drink less juice and soda pop, and eat more fruits and vegetables. ? Eat a healthy amount of food. A 3-ounce serving of meat, for example, is about the size of a deck of cards. Fill the rest of your plate with vegetables and whole grains. ? Limit the amount of soda and sports drinks you have every day. Drink more water when you are thirsty. ? Eat at least 5 servings of fruits and vegetables every day. It may seem like a lot, but it is not hard to reach this goal. A serving or helping is 1 piece of fruit, 1 cup of vegetables, or 2 cups of leafy, raw vegetables. Have an apple or some carrot sticks as an afternoon snack instead of a candy bar. Try to have fruits and/or vegetables at every meal.  · Make exercise part of your daily routine. You may want to start with simple activities, such as walking, bicycling, or slow swimming. Try to be active 30 to 60 minutes every day. You do not need to do all 30 to 60 minutes all at once.  For example, you can exercise 3 times a day for 10 or 20 minutes. Moderate exercise is safe for most people, but it is always a good idea to talk to your doctor before starting an exercise program.  · Keep moving. Dustin Em the lawn, work in the garden, or [employer]. Take the stairs instead of the elevator at work. · If you smoke, quit. People who smoke have an increased risk for heart attack, stroke, cancer, and other lung illnesses. Quitting is hard, but there are ways to boost your chance of quitting tobacco for good. ? Use nicotine gum, patches, or lozenges. ? Ask your doctor about stop-smoking programs and medicines. ? Keep trying. In addition to reducing your risk of diseases in the future, you will notice some benefits soon after you stop using tobacco. If you have shortness of breath or asthma symptoms, they will likely get better within a few weeks after you quit. · Limit how much alcohol you drink. Moderate amounts of alcohol (up to 2 drinks a day for men, 1 drink a day for women) are okay. But drinking too much can lead to liver problems, high blood pressure, and other health problems. Family health  If you have a family, there are many things you can do together to improve your health. · Eat meals together as a family as often as possible. · Eat healthy foods. This includes fruits, vegetables, lean meats and dairy, and whole grains. · Include your family in your fitness plan. Most people think of activities such as jogging or tennis as the way to fitness, but there are many ways you and your family can be more active. Anything that makes you breathe hard and gets your heart pumping is exercise. Here are some tips:  ? Walk to do errands or to take your child to school or the bus.  ? Go for a family bike ride after dinner instead of watching TV. Where can you learn more? Go to http://paulie-nestor.info/. Enter V924 in the search box to learn more about \"A Healthy Lifestyle: Care Instructions. \"  Current as of: September 11, 2018  Content Version: 11.9  © 5786-0021 [employer]. Care instructions adapted under license by [employer] (which disclaims liability or warranty for this information). If you have questions about a medical condition or this instruction, always ask your healthcare professional. [address] any warranty or liability for your use of this information. [address] [facility]   Statement to Patients  April 1, 2014      In an effort to ensure the large volume of patient prescription refills is processed in the most efficient and expeditious manner, we are asking our patients to assist us by calling your Pharmacy for all prescription refills, this will include also your  Mail Order Pharmacy. The pharmacy will contact our office electronically to continue the refill process. Please do not wait until the last minute to call your pharmacy. We need at least 48 hours (2days) to fill prescriptions. We also encourage you to call your pharmacy before going to  your prescription to make sure it is ready. With regard to controlled substance prescription refill requests (narcotic refills) that need to be picked up at our office, we ask your cooperation by providing us with at least 72 hours (3days) notice that you will need a refill. We will not refill narcotic prescription refill requests after 4:00pm on any weekday, Monday through Thursday, or after 2:00pm on Fridays, or on the weekends. We encourage everyone to explore another way of getting your prescription refill request processed using [employer], our patient web portal through our electronic medical record system. [employer] is an efficient and effective way to communicate your medication request directly to the office and  downloadable as an mahin on your smart phone .  [employer] also features a review functionality that allows you to view your medication list as well as leave messages for your physician. Are you ready to get connected? If so please review the attatched instructions or speak to any of our staff to get you set up right away! Thank you so much for your cooperation. Should you have any questions please contact our Practice Administrator.     The Physicians and Staff,  [facility]
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
## 2019-03-12 NOTE — H&P
SUBJECTIVE:   Hansel Telles is a 2 year old male who presents to clinic today for the following health issues:    Acute Illness   Acute illness concerns: sore throat   Onset: this morning    Fever: no    Chills/Sweats: no    Headache (location?): no    Sinus Pressure:no    Conjunctivitis:  no    Ear Pain: left ear     Rhinorrhea: YES    Congestion: YES    Sore Throat: YES- right tonsil looks red      Cough: yes-minimally    Wheeze: no    Decreased Appetite: no    Nausea: no    Vomiting: no    Diarrhea:  YES    Dysuria/Freq.: no    Fatigue/Achiness: no    Sick/Strep Exposure: no     Therapies Tried and outcome: none    Patient reports throat was sore this morning but is not sore now.     Problem list and histories reviewed & adjusted, as indicated.  Additional history: as documented    BP Readings from Last 3 Encounters:   03/12/19 92/62 (44 %/ 85 %)*     *BP percentiles are based on the August 2017 AAP Clinical Practice Guideline for boys       body mass index is 17.38 kg/m .    Wt Readings from Last 4 Encounters:   03/12/19 20.7 kg (45 lb 11.2 oz) (88 %)*   12/08/15 14.1 kg (31 lb) (>99 %)      * Growth percentiles are based on CDC (Boys, 2-20 Years) data.       Growth percentiles are based on WHO (Boys, 0-2 years) data.       Health Maintenance    Health Maintenance Due   Topic Date Due     PREVENTIVE CARE VISIT  2014     LEAD 12/24 MONTHS (SYSTEM ASSIGNED) (1) 07/04/2015     IPV IMMUNIZATION (4 of 4 - All-IPV series) 07/04/2018     MMR IMMUNIZATION (2 of 2 - Standard series) 07/04/2018     VARICELLA IMMUNIZATION (2 of 2 - 2-dose childhood series) 07/04/2018     DTAP/TDAP/TD IMMUNIZATION (5 - DTaP) 07/04/2018     INFLUENZA VACCINE (1) 09/01/2018       Current Problem List    Patient Active Problem List   Diagnosis     NO ACTIVE PROBLEMS       Past Medical History    Past Medical History:   Diagnosis Date     NO ACTIVE PROBLEMS        Past Surgical History    Past Surgical History:   Procedure  Cardiology History and Physical     Date of  Admission: 10/10/2017     Admission type: Emergency    Vijaya Bain is a 46 y.o. male admitted for STEMI. At 2;20 pm he had sudden SS severe chest tightness with clamminess and SOB. Here his ekg shows STEMI in inferior wall. His pmhx is positive for hyperlipidemia and HTN. His mother had CABG. He admits to recent easy fatigability. No failure sx. No exertional sx of CP or SOB    Patient Active Problem List    Diagnosis Date Noted    Anxiety 12/13/2016    Intractable chronic post-traumatic headache 12/13/2016    ADD (attention deficit disorder) 12/13/2016    Hypovitaminosis D 03/07/2016    Hypercholesteremia 03/07/2016      Kaye Powell MD  Past Medical History:   Diagnosis Date    Anxiety disorder     Depression     Diarrhea     Falls     Headache     Joint pain     Joint pain     Memory disorder     Memory loss     Muscle pain     Muscle pain     Muscle weakness     Muscle weakness     Torn rotator cuff     right side    Vertigo     Visual disturbance     Visual disturbance       History reviewed. No pertinent surgical history. Family History   Problem Relation Age of Onset    Heart Disease Mother     Stroke Mother       Prior to Admission medications    Medication Sig Start Date End Date Taking? Authorizing Provider   rosuvastatin (CRESTOR) 10 mg tablet Take 1 Tab by mouth nightly. 8/11/17   Kaye Powell MD   traZODone (DESYREL) 50 mg tablet Take 25 mg by mouth nightly. Historical Provider   gabapentin (NEURONTIN) 600 mg tablet Take 1 Tab by mouth three (3) times daily. 7/13/17   Isabella Hollingsworth MD   cyclobenzaprine (FLEXERIL) 10 mg tablet Take  by mouth three (3) times daily as needed for Muscle Spasm(s). Historical Provider   ARIPiprazole (ABILIFY) 2 mg tablet Take 5 mg by mouth two (2) times a day. Historical Provider   lovastatin (MEVACOR) 40 mg tablet Take 1 Tab by mouth nightly.  2/10/17   Kaye Powell MD   prazosin (MINIPRESS) 2 mg capsule Take 5 mg by mouth nightly. Historical Provider   sertraline (ZOLOFT) 100 mg tablet Take 200 mg by mouth daily. Historical Provider   dextromethorphan-quiNIDine (NUEDEXTA) 20-10 mg per capsule Take 1 Cap by mouth two (2) times a day. Historical Provider   butalbital-acetaminophen-caffeine (FIORICET) -40 mg per tablet Take 1 Tab by mouth. Historical Provider      No Known Allergies     Review of Systems    Review of Systems  Except as noted in HPI, patient denies recent fever or chills, nausea, vomiting, diarrhea, hemoptysis, hematemesis, dysuria, myalgias, focal neurologic symptoms, ecchymosis, angioedema, odynophagia, dysphagia, sore throat, earache,rash, melena, hematochezia, depression, GERD, cold intolerance, petechia, bleeding gums, or significant weight loss. A comprehensive review of systems was negative except for that written in the HPI. Physical Exam    Objective:    Physical Exam:  24 hr VS reviewed, overall VSSAF  Temp (24hrs), Av °F (36.7 °C), Min:98 °F (36.7 °C), Max:98 °F (36.7 °C)    Patient Vitals for the past 8 hrs:   Pulse   10/10/17 1548 64    Patient Vitals for the past 8 hrs:   Resp   10/10/17 1548 18    Patient Vitals for the past 8 hrs:   BP   10/10/17 1548 (!) 140/91        No intake or output data in the 24 hours ending 10/10/17 1631  General Appearance:   [x] well developed, well nourished,  [x]NAD.       []     agitated   []     lethargic but arousable  []     obtunded   ENT, Palate:    [x] WNL   []     dry palate/MM     [x]anicteric     Respiratory:    [x]CTA bilateral  [] rales  [] rhonchi  [] normal resp effort   Cardiovascular:   [x] RRR   [] Irregular rate and rhythm  [] ectopy   [x] Normal S1, S2   [x]  s4 gallop noted   [] no murmur   [] murmur c/w:   [x]  no edema RLE:[]1+ [] 2+ []3+; LLE:[]1+ []2+ [] 3+   [x]  normal JVP   []     Elevated JVP    [x] carotid upstroke nl   [x] abd aorta not palpated   [x] no stigmata of Laterality Date     NO HISTORY OF SURGERY         Current Medications    Current Outpatient Medications   Medication Sig Dispense Refill     amoxicillin (AMOXIL) 400 MG/5ML suspension Take 6.4 mLs (512 mg) by mouth 2 times daily for 10 days 128 mL 0       Allergies    No Known Allergies    Immunizations    Immunization History   Administered Date(s) Administered     DTAP (<7y) 02/03/2016     DTaP / Hep B / IPV 2014, 2014, 01/08/2015     HEPA 07/30/2015, 02/03/2016     HepB 2014     Hib (PRP-T) 2014, 2014, 01/08/2015, 10/21/2015     Influenza Vaccine IM 3yrs+ 4 Valent IIV4 02/06/2015, 11/20/2017     Influenza Vaccine IM Ages 6-35 Months 4 Valent (PF) 10/21/2015, 12/03/2016     Influenza vaccine ages 6-35 months 02/06/2015, 10/21/2015     MMR 07/30/2015     Pneumo Conj 13-V (2010&after) 2014, 2014, 01/08/2015, 10/21/2015     Rotavirus, pentavalent 2014, 2014, 01/08/2015     Varicella 07/30/2015       Family History    History reviewed. No pertinent family history.    Social History    Social History     Socioeconomic History     Marital status: Single     Spouse name: Not on file     Number of children: 0     Years of education: Not on file     Highest education level: Not on file   Occupational History     Not on file   Social Needs     Financial resource strain: Not on file     Food insecurity:     Worry: Not on file     Inability: Not on file     Transportation needs:     Medical: Not on file     Non-medical: Not on file   Tobacco Use     Smoking status: Never Smoker     Smokeless tobacco: Never Used   Substance and Sexual Activity     Alcohol use: No     Alcohol/week: 0.0 oz     Frequency: Never     Drug use: No     Sexual activity: No   Lifestyle     Physical activity:     Days per week: Not on file     Minutes per session: Not on file     Stress: Not on file   Relationships     Social connections:     Talks on phone: Not on file     Gets together: Not on file  "    Attends [employer] service: Not on file     Active member of club or organization: Not on file     Attends meetings of clubs or organizations: Not on file     Relationship status: Not on file     Intimate partner violence:     Fear of current or ex partner: Not on file     Emotionally abused: Not on file     Physically abused: Not on file     Forced sexual activity: Not on file   Other Topics Concern     Not on file   Social History Narrative     Not on file       All above reviewed and updated, all stable unless otherwise noted    Recent labs reviewed    ROS:  Constitutional, HEENT, cardiovascular, pulmonary, GI, , musculoskeletal, neuro, skin, endocrine and psych systems are negative, except as otherwise noted.    OBJECTIVE:                                                    BP 92/62 (BP Location: Left arm, Patient Position: Chair, Cuff Size: Child)   Pulse 114   Temp 97.8  F (36.6  C) (Tympanic)   Ht 1.092 m (3' 7\")   Wt 20.7 kg (45 lb 11.2 oz)   SpO2 98%   BMI 17.38 kg/m    Body mass index is 17.38 kg/m .  GENERAL: healthy, alert and no distress  EYES: Eyes grossly normal to inspection  HENT:ear canals and TM's normal upon viewing with otoscope, nose and mouth without ulcers or lesions upon viewing with otoscope  NECK: no tenderness, no adenopathy, no asymmetry, no masses, no stiffness; thyroid- normal to palpation  RESP: lungs clear to auscultation - no rales, no rhonchi, no wheezes  CV: regular rates and rhythm, normal S1 S2, no S3 or S4 and no murmur, no click or rub -  ABDOMEN: soft, no tenderness, no  hepatosplenomegaly, no masses, normal bowel sounds  MS: extremities- no gross deformities noted, no edema  SKIN: no suspicious lesions, no rashes  NEURO: strength and tone- normal, sensory exam- grossly normal, mentation- intact, speech- normal  BACK: no CVA tenderness, no paralumbar tenderness  PSYCH: Alert and oriented times 3; speech- coherent , normal rate and volume; able to articulate logical " peripheral emboli   GI:    [x] abd soft, non-distented,bowel sounds present, no                     organomegaly appreciated   Skin:  Neuro:      [x]  warm and dry []     cold extremities   [x]  A/O x 3,  [x]     grossly non-focal    []  lethargic but arousable  [] obtunded        Cardiographics    Telemetry: normal sinus rhythm  ECG: normal EKG, normal sinus rhythm, unchanged from previous tracings  Echocardiogram: Not done    Labs:   Recent Results (from the past 24 hour(s))   EKG, 12 LEAD, INITIAL    Collection Time: 10/10/17  3:53 PM   Result Value Ref Range    Ventricular Rate 62 BPM    Atrial Rate 62 BPM    P-R Interval 158 ms    QRS Duration 82 ms    Q-T Interval 418 ms    QTC Calculation (Bezet) 424 ms    Calculated P Axis 29 degrees    Calculated R Axis 44 degrees    Calculated T Axis 61 degrees    Diagnosis       Normal sinus rhythm  Nonspecific ST abnormality  No previous ECGs available     CBC WITH AUTOMATED DIFF    Collection Time: 10/10/17  4:00 PM   Result Value Ref Range    WBC 5.4 4.1 - 11.1 K/uL    RBC 4.73 4.10 - 5.70 M/uL    HGB 13.6 12.1 - 17.0 g/dL    HCT 41.1 36.6 - 50.3 %    MCV 86.9 80.0 - 99.0 FL    MCH 28.8 26.0 - 34.0 PG    MCHC 33.1 30.0 - 36.5 g/dL    RDW 13.6 11.5 - 14.5 %    PLATELET 766 967 - 194 K/uL    NEUTROPHILS 65 32 - 75 %    LYMPHOCYTES 25 12 - 49 %    MONOCYTES 6 5 - 13 %    EOSINOPHILS 3 0 - 7 %    BASOPHILS 1 0 - 1 %    ABS. NEUTROPHILS 3.6 1.8 - 8.0 K/UL    ABS. LYMPHOCYTES 1.4 0.8 - 3.5 K/UL    ABS. MONOCYTES 0.3 0.0 - 1.0 K/UL    ABS. EOSINOPHILS 0.2 0.0 - 0.4 K/UL    ABS.  BASOPHILS 0.0 0.0 - 0.1 K/UL   EKG, 12 LEAD, INITIAL    Collection Time: 10/10/17  4:13 PM   Result Value Ref Range    Ventricular Rate 66 BPM    Atrial Rate 66 BPM    P-R Interval 158 ms    QRS Duration 88 ms    Q-T Interval 414 ms    QTC Calculation (Bezet) 434 ms    Calculated P Axis 33 degrees    Calculated R Axis 35 degrees    Calculated T Axis 43 degrees    Diagnosis       Normal sinus rhythm  ST elevation, consider inferior injury or acute infarct  ** ** ACUTE MI / STEMI ** **  When compared with ECG of 10-OCT-2017 15:56,  MANUAL COMPARISON REQUIRED, DATA IS UNCONFIRMED          Assessment:     Assessment:      Active Problems:    * No active hospital problems. *  STEMI  HTN  Hyperlipdemia     Plan:    To cath lab  ASA/BB    Signed By: David Pichardo MD     October 10, 2017 thoughts, able to abstract reason, no tangential thoughts, no hallucinations or delusions, affect- normal    DIAGNOSTICS/PROCEDURES:                                                      Results for orders placed or performed in visit on 03/12/19 (from the past 24 hour(s))   Strep, Rapid Screen   Result Value Ref Range    Specimen Description Throat     Rapid Strep A Screen (A)      POSITIVE: Group A Streptococcal antigen detected by immunoassay.        ASSESSMENT/PLAN:                                                        ICD-10-CM    1. Strep throat J02.0 amoxicillin (AMOXIL) 400 MG/5ML suspension   2. Sore throat J02.9 Strep, Rapid Screen     amoxicillin (AMOXIL) 400 MG/5ML suspension     Discussed treatment/modality options, including risk and benefits, he desires advised 1 multivitamin per day and advised dentist every 6 months. All diagnosis above reviewed and noted above, otherwise stable.  See [facility] orders for further details.  Follow up as needed.    1) Patient presented today with a sore throat. Patient's rapid strep test was positive. Patient prescribed Amoxicillin today for strep throat treatment.     2) Follow up if symptoms persist or worsen.     Health Maintenance Due   Topic Date Due     PREVENTIVE CARE VISIT  2014     LEAD 12/24 MONTHS (SYSTEM ASSIGNED) (1) 07/04/2015     IPV IMMUNIZATION (4 of 4 - All-IPV series) 07/04/2018     MMR IMMUNIZATION (2 of 2 - Standard series) 07/04/2018     VARICELLA IMMUNIZATION (2 of 2 - 2-dose childhood series) 07/04/2018     DTAP/TDAP/TD IMMUNIZATION (5 - DTaP) 07/04/2018     INFLUENZA VACCINE (1) 09/01/2018     This document serves as a record of the services and decisions personally performed and made by Rodolfo Rao MD. It was created on his behalf by Lenin Ansari, a trained medical scribe. The creation of this document is based on the provider's statements to the medical scribe.  Lenin Ansari March 12, 2019 10:45 AM     The information in this document,  created by the medical scribe for me, accurately reflects the services I personally performed and the decisions made by me. I have reviewed and approved this document for accuracy prior to leaving the patient care area.  March 12, 2019            Rodolfo Rao MD [address]  [address] [phone] Fax

## 2019-04-19 ENCOUNTER — TELEPHONE (OUTPATIENT)
Dept: NEUROLOGY | Age: 54
End: 2019-04-19

## 2019-04-19 NOTE — TELEPHONE ENCOUNTER
Ruth Ann from [facility]  office calling to speak with Connie Montiel in regards to patient. Please advise.     Best # [phone]

## 2019-05-08 ENCOUNTER — HOSPITAL ENCOUNTER (EMERGENCY)
Age: 54
Discharge: HOME | End: 2019-05-08
Attending: EMERGENCY MEDICINE
Payer: MEDICARE

## 2019-05-08 ENCOUNTER — APPOINTMENT (OUTPATIENT)
Dept: CT IMAGING | Age: 54
End: 2019-05-08
Attending: EMERGENCY MEDICINE
Payer: MEDICARE

## 2019-05-08 VITALS
DIASTOLIC BLOOD PRESSURE: 92 MMHG | OXYGEN SATURATION: 97 % | RESPIRATION RATE: 14 BRPM | TEMPERATURE: 98.4 F | HEART RATE: 66 BPM | SYSTOLIC BLOOD PRESSURE: 114 MMHG

## 2019-05-08 DIAGNOSIS — G44.329 CHRONIC POST-TRAUMATIC HEADACHE, NOT INTRACTABLE: Primary | ICD-10-CM

## 2019-05-08 DIAGNOSIS — N20.1 RIGHT URETERAL STONE: Primary | ICD-10-CM

## 2019-05-08 LAB
ALBUMIN SERPL-MCNC: 4.1 G/DL (ref 3.5–5)
ALBUMIN/GLOB SERPL: 1.1 {RATIO} (ref 1.1–2.2)
ALP SERPL-CCNC: 80 U/L (ref 45–117)
ALT SERPL-CCNC: 34 U/L (ref 12–78)
ANION GAP SERPL CALC-SCNC: 7 MMOL/L (ref 5–15)
APPEARANCE UR: CLEAR
AST SERPL-CCNC: 23 U/L (ref 15–37)
ATRIAL RATE: 57 BPM
BACTERIA URNS QL MICRO: NEGATIVE /HPF
BASOPHILS # BLD: 0 K/UL (ref 0–0.1)
BASOPHILS NFR BLD: 1 % (ref 0–1)
BILIRUB SERPL-MCNC: 1.7 MG/DL (ref 0.2–1)
BILIRUB UR QL: NEGATIVE
BUN SERPL-MCNC: 13 MG/DL (ref 6–20)
BUN/CREAT SERPL: 9 (ref 12–20)
CALCIUM SERPL-MCNC: 9.3 MG/DL (ref 8.5–10.1)
CALCULATED P AXIS, ECG09: 68 DEGREES
CALCULATED R AXIS, ECG10: 37 DEGREES
CALCULATED T AXIS, ECG11: 32 DEGREES
CHLORIDE SERPL-SCNC: 107 MMOL/L (ref 97–108)
CO2 SERPL-SCNC: 25 MMOL/L (ref 21–32)
COLOR UR: ABNORMAL
COMMENT, HOLDF: NORMAL
CREAT SERPL-MCNC: 1.37 MG/DL (ref 0.7–1.3)
DIAGNOSIS, 93000: NORMAL
DIFFERENTIAL METHOD BLD: ABNORMAL
EOSINOPHIL # BLD: 0.1 K/UL (ref 0–0.4)
EOSINOPHIL NFR BLD: 1 % (ref 0–7)
EPITH CASTS URNS QL MICRO: ABNORMAL /LPF
ERYTHROCYTE [DISTWIDTH] IN BLOOD BY AUTOMATED COUNT: 13.1 % (ref 11.5–14.5)
GLOBULIN SER CALC-MCNC: 3.7 G/DL (ref 2–4)
GLUCOSE SERPL-MCNC: 120 MG/DL (ref 65–100)
GLUCOSE UR STRIP.AUTO-MCNC: NEGATIVE MG/DL
HCT VFR BLD AUTO: 44.3 % (ref 36.6–50.3)
HGB BLD-MCNC: 14.4 G/DL (ref 12.1–17)
HGB UR QL STRIP: ABNORMAL
IMM GRANULOCYTES # BLD AUTO: 0 K/UL (ref 0–0.04)
IMM GRANULOCYTES NFR BLD AUTO: 1 % (ref 0–0.5)
KETONES UR QL STRIP.AUTO: NEGATIVE MG/DL
LEUKOCYTE ESTERASE UR QL STRIP.AUTO: NEGATIVE
LYMPHOCYTES # BLD: 1.2 K/UL (ref 0.8–3.5)
LYMPHOCYTES NFR BLD: 15 % (ref 12–49)
MCH RBC QN AUTO: 28.4 PG (ref 26–34)
MCHC RBC AUTO-ENTMCNC: 32.5 G/DL (ref 30–36.5)
MCV RBC AUTO: 87.4 FL (ref 80–99)
MONOCYTES # BLD: 0.4 K/UL (ref 0–1)
MONOCYTES NFR BLD: 6 % (ref 5–13)
NEUTS SEG # BLD: 5.9 K/UL (ref 1.8–8)
NEUTS SEG NFR BLD: 76 % (ref 32–75)
NITRITE UR QL STRIP.AUTO: NEGATIVE
NRBC # BLD: 0 K/UL (ref 0–0.01)
NRBC BLD-RTO: 0 PER 100 WBC
P-R INTERVAL, ECG05: 182 MS
PH UR STRIP: 8 [PH] (ref 5–8)
PLATELET # BLD AUTO: 219 K/UL (ref 150–400)
PMV BLD AUTO: 9.2 FL (ref 8.9–12.9)
POTASSIUM SERPL-SCNC: 3.7 MMOL/L (ref 3.5–5.1)
PROT SERPL-MCNC: 7.8 G/DL (ref 6.4–8.2)
PROT UR STRIP-MCNC: ABNORMAL MG/DL
Q-T INTERVAL, ECG07: 456 MS
QRS DURATION, ECG06: 84 MS
QTC CALCULATION (BEZET), ECG08: 443 MS
RBC # BLD AUTO: 5.07 M/UL (ref 4.1–5.7)
RBC #/AREA URNS HPF: ABNORMAL /HPF (ref 0–5)
SAMPLES BEING HELD,HOLD: NORMAL
SODIUM SERPL-SCNC: 139 MMOL/L (ref 136–145)
SP GR UR REFRACTOMETRY: 1.02 (ref 1–1.03)
TROPONIN I SERPL-MCNC: <0.05 NG/ML
UR CULT HOLD, URHOLD: NORMAL
UROBILINOGEN UR QL STRIP.AUTO: 1 EU/DL (ref 0.2–1)
VENTRICULAR RATE, ECG03: 57 BPM
WBC # BLD AUTO: 7.6 K/UL (ref 4.1–11.1)
WBC URNS QL MICRO: ABNORMAL /HPF (ref 0–4)

## 2019-05-08 PROCEDURE — 99284 EMERGENCY DEPT VISIT MOD MDM: CPT

## 2019-05-08 PROCEDURE — 81001 URINALYSIS AUTO W/SCOPE: CPT

## 2019-05-08 PROCEDURE — 85025 COMPLETE CBC W/AUTO DIFF WBC: CPT

## 2019-05-08 PROCEDURE — 96375 TX/PRO/DX INJ NEW DRUG ADDON: CPT

## 2019-05-08 PROCEDURE — 74011636320 HC RX REV CODE- 636/320: Performed by: RADIOLOGY

## 2019-05-08 PROCEDURE — 74011000258 HC RX REV CODE- 258: Performed by: RADIOLOGY

## 2019-05-08 PROCEDURE — 84484 ASSAY OF TROPONIN QUANT: CPT

## 2019-05-08 PROCEDURE — 36415 COLL VENOUS BLD VENIPUNCTURE: CPT

## 2019-05-08 PROCEDURE — 96361 HYDRATE IV INFUSION ADD-ON: CPT

## 2019-05-08 PROCEDURE — 93005 ELECTROCARDIOGRAM TRACING: CPT

## 2019-05-08 PROCEDURE — 77030034850

## 2019-05-08 PROCEDURE — 96374 THER/PROPH/DIAG INJ IV PUSH: CPT

## 2019-05-08 PROCEDURE — 74177 CT ABD & PELVIS W/CONTRAST: CPT

## 2019-05-08 PROCEDURE — 80053 COMPREHEN METABOLIC PANEL: CPT

## 2019-05-08 PROCEDURE — 74011250636 HC RX REV CODE- 250/636: Performed by: EMERGENCY MEDICINE

## 2019-05-08 RX ORDER — SODIUM CHLORIDE 0.9 % (FLUSH) 0.9 %
10 SYRINGE (ML) INJECTION
Status: COMPLETED | OUTPATIENT
Start: 2019-05-08 | End: 2019-05-08

## 2019-05-08 RX ORDER — ONDANSETRON 2 MG/ML
4 INJECTION INTRAMUSCULAR; INTRAVENOUS
Status: COMPLETED | OUTPATIENT
Start: 2019-05-08 | End: 2019-05-08

## 2019-05-08 RX ORDER — OXYCODONE AND ACETAMINOPHEN 5; 325 MG/1; MG/1
1 TABLET ORAL
Qty: 12 TAB | Refills: 0 | Status: SHIPPED | OUTPATIENT
Start: 2019-05-08 | End: 2019-05-11

## 2019-05-08 RX ORDER — HYDROMORPHONE HYDROCHLORIDE 1 MG/ML
1 INJECTION, SOLUTION INTRAMUSCULAR; INTRAVENOUS; SUBCUTANEOUS ONCE
Status: COMPLETED | OUTPATIENT
Start: 2019-05-08 | End: 2019-05-08

## 2019-05-08 RX ORDER — KETOROLAC TROMETHAMINE 30 MG/ML
15 INJECTION, SOLUTION INTRAMUSCULAR; INTRAVENOUS
Status: COMPLETED | OUTPATIENT
Start: 2019-05-08 | End: 2019-05-08

## 2019-05-08 RX ORDER — TAMSULOSIN HYDROCHLORIDE 0.4 MG/1
0.4 CAPSULE ORAL DAILY
Qty: 15 CAP | Refills: 0 | Status: SHIPPED | OUTPATIENT
Start: 2019-05-08 | End: 2019-05-23

## 2019-05-08 RX ADMIN — SODIUM CHLORIDE 100 ML: 900 INJECTION, SOLUTION INTRAVENOUS at 16:18

## 2019-05-08 RX ADMIN — Medication 10 ML: at 16:18

## 2019-05-08 RX ADMIN — HYDROMORPHONE HYDROCHLORIDE 1 MG: 1 INJECTION, SOLUTION INTRAMUSCULAR; INTRAVENOUS; SUBCUTANEOUS at 16:09

## 2019-05-08 RX ADMIN — IOPAMIDOL 100 ML: 755 INJECTION, SOLUTION INTRAVENOUS at 16:18

## 2019-05-08 RX ADMIN — ONDANSETRON 4 MG: 2 INJECTION INTRAMUSCULAR; INTRAVENOUS at 16:08

## 2019-05-08 RX ADMIN — KETOROLAC TROMETHAMINE 15 MG: 30 INJECTION, SOLUTION INTRAMUSCULAR at 17:36

## 2019-05-08 RX ADMIN — SODIUM CHLORIDE 1000 ML: 900 INJECTION, SOLUTION INTRAVENOUS at 16:44

## 2019-05-08 NOTE — DISCHARGE INSTRUCTIONS
Patient Education        Kidney Stone: Care Instructions  Your Care Instructions    Kidney stones are formed when salts, minerals, and other substances normally found in the urine clump together. They can be as small as grains of sand or, rarely, as large as golf balls. While the stone is traveling through the ureter, which is the tube that carries urine from the kidney to the bladder, you will probably feel pain. The pain may be mild or very severe. You may also have some blood in your urine. As soon as the stone reaches the bladder, any intense pain should go away. If a stone is too large to pass on its own, you may need a medical procedure to help you pass the stone. The doctor has checked you carefully, but problems can develop later. If you notice any problems or new symptoms, get medical treatment right away. Follow-up care is a key part of your treatment and safety. Be sure to make and go to all appointments, and call your doctor if you are having problems. It's also a good idea to know your test results and keep a list of the medicines you take. How can you care for yourself at home? · Drink plenty of fluids, enough so that your urine is light yellow or clear like water. If you have kidney, heart, or liver disease and have to limit fluids, talk with your doctor before you increase the amount of fluids you drink. · Take pain medicines exactly as directed. Call your doctor if you think you are having a problem with your medicine. ? If the doctor gave you a prescription medicine for pain, take it as prescribed. ? If you are not taking a prescription pain medicine, ask your doctor if you can take an over-the-counter medicine. Read and follow all instructions on the label. · Your doctor may ask you to strain your urine so that you can collect your kidney stone when it passes. You can use a kitchen strainer or a tea strainer to catch the stone.  Store it in a plastic bag until you see your doctor again.  Preventing future kidney stones  Some changes in your diet may help prevent kidney stones. Depending on the cause of your stones, your doctor may recommend that you:  · Drink plenty of fluids, enough so that your urine is light yellow or clear like water. If you have kidney, heart, or liver disease and have to limit fluids, talk with your doctor before you increase the amount of fluids you drink. · Limit coffee, tea, and alcohol. Also avoid grapefruit juice. · Do not take more than the recommended daily dose of vitamins C and D.  · Avoid antacids such as Gaviscon, Maalox, Mylanta, or Tums. · Limit the amount of salt (sodium) in your diet. · Eat a balanced diet that is not too high in protein. · Limit foods that are high in a substance called oxalate, which can cause kidney stones. These foods include dark green vegetables, rhubarb, chocolate, wheat bran, nuts, cranberries, and beans. When should you call for help? Call your doctor now or seek immediate medical care if:    · You cannot keep down fluids.     · Your pain gets worse.     · You have a fever or chills.     · You have new or worse pain in your back just below your rib cage (the flank area).     · You have new or more blood in your urine.    Watch closely for changes in your health, and be sure to contact your doctor if:    · You do not get better as expected. Where can you learn more? Go to http://paulie-nestor.info/. Enter U742 in the search box to learn more about \"Kidney Stone: Care Instructions. \"  Current as of: March 14, 2018  Content Version: 11.9  © 9731-8812 [employer]. Care instructions adapted under license by [employer] (which disclaims liability or warranty for this information).  If you have questions about a medical condition or this instruction, always ask your healthcare professional. [address] any warranty or liability for your use of this information.

## 2019-05-08 NOTE — ED NOTES
Pt stated he is unable to void and is unable to breath, oxygen sats 99% on room air, resp even and unlabored, pt last voided this am

## 2019-05-08 NOTE — ED PROVIDER NOTES
52 y.o. male with past medical history significant for anxiety, depression, vertigo, CAD, and torn rotator cuff who presents from home via private vehicle with chief complaint of abdominal pain. Patient c/o 10/10 abdominal pain with shortness of breath and vomiting with any PO intake. He notes decreased urine output. Patient recently had cardiac stents placed. Specifically denies any other pain or symptoms. There are no other acute medical concerns at this time. Social hx: Never tobacco smoker; Denies EtOH use; Denies illicit drug use PCP: Michael Weaver MD 
 
Note written by Millicent Harris, as dictated by Ashish Martinez MD 3:05 PM 
 
The history is provided by the patient. No  was used. Past Medical History:  
Diagnosis Date  Anxiety disorder  CAD (coronary artery disease) 10/10/2017 STEMI and stents  Depression  Diarrhea [address]  Headache  Joint pain  Joint pain  Memory disorder  Memory loss  Muscle pain  Muscle pain  Muscle weakness  Muscle weakness  Torn rotator cuff   
 right side  Vertigo  Visual disturbance  Visual disturbance Past Surgical History:  
Procedure Laterality Date  HX ORTHOPAEDIC  02/2017  
 rotator cuff repair  HX OTHER SURGICAL  2016  
 cervical radiculopathy Family History:  
Problem Relation Age of Onset  Heart Disease Mother  Stroke Mother Social History Socioeconomic History  Marital status:  Spouse name: Not on file  Number of children: Not on file  Years of education: Not on file  Highest education level: Not on file Occupational History  Not on file Social Needs  Financial resource strain: Not on file  Food insecurity:  
  Worry: Not on file Inability: Not on file  Transportation needs:  
  Medical: Not on file Non-medical: Not on file Tobacco Use  Smoking status: Never Smoker  Smokeless tobacco: Never Used Substance and Sexual Activity  Alcohol use: No  
 Drug use: No  
 Sexual activity: Yes Lifestyle  Physical activity:  
  Days per week: Not on file Minutes per session: Not on file  Stress: Not on file Relationships  Social connections:  
  Talks on phone: Not on file Gets together: Not on file Attends Worship service: Not on file Active member of club or organization: Not on file Attends meetings of clubs or organizations: Not on file Relationship status: Not on file  Intimate partner violence:  
  Fear of current or ex partner: Not on file Emotionally abused: Not on file Physically abused: Not on file Forced sexual activity: Not on file Other Topics Concern  Not on file Social History Narrative  Not on file ALLERGIES: Patient has no known allergies. Review of Systems Constitutional: Negative for appetite change, chills and fever. Respiratory: Positive for shortness of breath. Cardiovascular: Negative for chest pain. Gastrointestinal: Positive for abdominal pain and vomiting. Negative for constipation and diarrhea. Genitourinary: Positive for difficulty urinating and frequency (decreased frequency). Neurological: Negative for dizziness and light-headedness. All other systems reviewed and are negative. Vitals:  
 05/08/19 1430 Pulse: (!) 58 SpO2: 98% Physical Exam  
Constitutional: He is oriented to person, place, and time. He appears well-developed and well-nourished. HENT:  
Head: Normocephalic and atraumatic. Eyes: No scleral icterus. Neck: Normal range of motion. Cardiovascular: Normal rate and regular rhythm. Regular rate and rhythm. Pulmonary/Chest: Effort normal and breath sounds normal. Tachypnea noted. Pt is tachypneic. Lungs are clear. Abdominal: He exhibits no distension. Neurological: He is alert and oriented to person, place, and time. Skin: No rash noted. No erythema. Nursing note and vitals reviewed. Note written by Sueellen Kocher, Scribe, as dictated by Amauri Chaidez MD 3:05 PM 
 
MDM Number of Diagnoses or Management Options Right ureteral stone: new and requires workup Diagnosis management comments: The patient is resting comfortably and feels better, is alert and in no distress. CT shows ureteral stone. The repeat examination is unremarkable and benign; in particular, there is no discomfort at McBurney's point. The history, exam, diagnostic testing, and current condition do not suggest acute appendicitis, bowel obstruction, incarcerated hernia, acute cholecystitis, bowel perforation, major gastrointestinal bleeding, severe diverticulitis, sepsis, or other significant pathology to warrant further testing, continued ED treatment, admission, or surgical evaluation at this point. The vital signs have been stable and are within normal limits at this time. The patient does not have uncontrollable pain, intractable vomiting, or other significant symptoms. The patient's condition is stable and appropriate for discharge. The patient will pursue further outpatient evaluation urology as indicated in the discharge instructions. Dced home with flomax and percocet to manage his symptoms. Procedures ED EKG interpretation: 1535 Rhythm: sinus bradycardia; and regular . Rate (approx.): 57 bpm; Axis: normal; ST/T wave: normal. 
Note written by Sueellen Kocher, Scribe, as dictated by Amauri Chaidez MD 3:49 PM 
 
The patient's results have been reviewed with them and/or available family.  Patient and/or family verbally conveyed their understanding and agreement of the patient's signs, symptoms, diagnosis, treatment and prognosis and additionally agree to follow up as recommended in the discharge instructions or to return to the Emergency Room should their condition change prior to their follow-up appointment. The patient/family verbally agrees with the care-plan and verbally conveys that all of their questions have been answered. The discharge instructions have also been provided to the patient and/or family with some educational information regarding the patient's diagnosis as well a list of reasons why the patient would want to return to the ER prior to their follow-up appointment, should their condition change.

## 2019-05-17 ENCOUNTER — TELEPHONE (OUTPATIENT)
Dept: NEUROLOGY | Age: 54
End: 2019-05-17

## 2019-05-17 NOTE — TELEPHONE ENCOUNTER
* Message from Teddy below:        ----- Message from Tyron Link sent at 5/17/2019  2:02 PM EDT -----  Regarding: Dr. Breana Mclaughlin  Pt requested a call from the nurse regarding a document that was written by the doctor. Pt stated he would like a call back\"asap\", due to wording in document that is incorrect. Best contact number [phone].

## 2019-05-17 NOTE — TELEPHONE ENCOUNTER
Spoke with patient's wife who just wanted to be sure we had gotten the letter to return to work. Per , we have received it and are faxing.

## 2019-05-20 ENCOUNTER — TELEPHONE (OUTPATIENT)
Dept: NEUROLOGY | Age: 54
End: 2019-05-20

## 2019-05-20 NOTE — TELEPHONE ENCOUNTER
Message from Phuong Silver. \"pls cl re: was waiting for a document to be sent to her email from the office, was supposed to get it today. Says she needs it asap. \"

## 2019-08-29 DIAGNOSIS — G44.321 INTRACTABLE CHRONIC POST-TRAUMATIC HEADACHE: ICD-10-CM

## 2019-08-29 DIAGNOSIS — G43.709 CHRONIC MIGRAINE WITHOUT AURA WITHOUT STATUS MIGRAINOSUS, NOT INTRACTABLE: ICD-10-CM

## 2019-08-29 NOTE — TELEPHONE ENCOUNTER
Requested Prescriptions     Pending Prescriptions Disp Refills    erenumab-aooe (AIMOVIG AUTOINJECTOR) 70 mg/mL injection 1 Syringe 3     Si mL by SubCUTAneous route every month.

## 2019-09-25 ENCOUNTER — APPOINTMENT (OUTPATIENT)
Dept: NON INVASIVE DIAGNOSTICS | Age: 54
End: 2019-09-25
Attending: INTERNAL MEDICINE
Payer: MEDICARE

## 2019-09-25 ENCOUNTER — HOSPITAL ENCOUNTER (EMERGENCY)
Age: 54
Discharge: HOME | End: 2019-09-25
Attending: EMERGENCY MEDICINE | Admitting: EMERGENCY MEDICINE
Payer: MEDICARE

## 2019-09-25 VITALS
RESPIRATION RATE: 18 BRPM | HEART RATE: 61 BPM | SYSTOLIC BLOOD PRESSURE: 131 MMHG | DIASTOLIC BLOOD PRESSURE: 83 MMHG | TEMPERATURE: 97.5 F | OXYGEN SATURATION: 95 %

## 2019-09-25 DIAGNOSIS — R07.2 PRECORDIAL PAIN: Primary | ICD-10-CM

## 2019-09-25 LAB
ALBUMIN SERPL-MCNC: 3.9 G/DL (ref 3.5–5)
ALBUMIN/GLOB SERPL: 1.2 {RATIO} (ref 1.1–2.2)
ALP SERPL-CCNC: 74 U/L (ref 45–117)
ALT SERPL-CCNC: 24 U/L (ref 12–78)
ANION GAP SERPL CALC-SCNC: 5 MMOL/L (ref 5–15)
AST SERPL-CCNC: 20 U/L (ref 15–37)
BASOPHILS # BLD: 0 K/UL (ref 0–0.1)
BASOPHILS NFR BLD: 1 % (ref 0–1)
BILIRUB SERPL-MCNC: 1.1 MG/DL (ref 0.2–1)
BUN SERPL-MCNC: 14 MG/DL (ref 6–20)
BUN/CREAT SERPL: 14 (ref 12–20)
CALCIUM SERPL-MCNC: 9 MG/DL (ref 8.5–10.1)
CHLORIDE SERPL-SCNC: 109 MMOL/L (ref 97–108)
CO2 SERPL-SCNC: 28 MMOL/L (ref 21–32)
COMMENT, HOLDF: NORMAL
CREAT SERPL-MCNC: 1.02 MG/DL (ref 0.7–1.3)
DIFFERENTIAL METHOD BLD: NORMAL
EOSINOPHIL # BLD: 0.2 K/UL (ref 0–0.4)
EOSINOPHIL NFR BLD: 3 % (ref 0–7)
ERYTHROCYTE [DISTWIDTH] IN BLOOD BY AUTOMATED COUNT: 13.6 % (ref 11.5–14.5)
GLOBULIN SER CALC-MCNC: 3.3 G/DL (ref 2–4)
GLUCOSE SERPL-MCNC: 98 MG/DL (ref 65–100)
HCT VFR BLD AUTO: 43.1 % (ref 36.6–50.3)
HGB BLD-MCNC: 14 G/DL (ref 12.1–17)
IMM GRANULOCYTES # BLD AUTO: 0 K/UL (ref 0–0.04)
IMM GRANULOCYTES NFR BLD AUTO: 0 % (ref 0–0.5)
LYMPHOCYTES # BLD: 1.3 K/UL (ref 0.8–3.5)
LYMPHOCYTES NFR BLD: 24 % (ref 12–49)
MCH RBC QN AUTO: 29.1 PG (ref 26–34)
MCHC RBC AUTO-ENTMCNC: 32.5 G/DL (ref 30–36.5)
MCV RBC AUTO: 89.6 FL (ref 80–99)
MONOCYTES # BLD: 0.4 K/UL (ref 0–1)
MONOCYTES NFR BLD: 7 % (ref 5–13)
NEUTS SEG # BLD: 3.5 K/UL (ref 1.8–8)
NEUTS SEG NFR BLD: 65 % (ref 32–75)
NRBC # BLD: 0 K/UL (ref 0–0.01)
NRBC BLD-RTO: 0 PER 100 WBC
PLATELET # BLD AUTO: 208 K/UL (ref 150–400)
PMV BLD AUTO: 8.9 FL (ref 8.9–12.9)
POTASSIUM SERPL-SCNC: 3.7 MMOL/L (ref 3.5–5.1)
PROT SERPL-MCNC: 7.2 G/DL (ref 6.4–8.2)
RBC # BLD AUTO: 4.81 M/UL (ref 4.1–5.7)
SAMPLES BEING HELD,HOLD: NORMAL
SODIUM SERPL-SCNC: 142 MMOL/L (ref 136–145)
STRESS ANGINA INDEX: 0
STRESS BASELINE HR: 53 BPM
STRESS ESTIMATED WORKLOAD: 10.1 METS
STRESS EXERCISE DUR MIN: NORMAL
STRESS PEAK DIAS BP: 80 MMHG
STRESS PEAK SYS BP: 180 MMHG
STRESS PERCENT HR ACHIEVED: 76 %
STRESS POST PEAK HR: 125 BPM
STRESS RATE PRESSURE PRODUCT: NORMAL BPM*MMHG
STRESS TARGET HR: 165 BPM
TROPONIN I BLD-MCNC: <0.04 NG/ML (ref 0–0.08)
TROPONIN I SERPL-MCNC: <0.05 NG/ML
WBC # BLD AUTO: 5.3 K/UL (ref 4.1–11.1)

## 2019-09-25 PROCEDURE — 84484 ASSAY OF TROPONIN QUANT: CPT

## 2019-09-25 PROCEDURE — 93005 ELECTROCARDIOGRAM TRACING: CPT

## 2019-09-25 PROCEDURE — 93320 DOPPLER ECHO COMPLETE: CPT

## 2019-09-25 PROCEDURE — 99285 EMERGENCY DEPT VISIT HI MDM: CPT

## 2019-09-25 PROCEDURE — 85025 COMPLETE CBC W/AUTO DIFF WBC: CPT

## 2019-09-25 PROCEDURE — 36415 COLL VENOUS BLD VENIPUNCTURE: CPT

## 2019-09-25 PROCEDURE — 80053 COMPREHEN METABOLIC PANEL: CPT

## 2019-09-25 PROCEDURE — 74011250637 HC RX REV CODE- 250/637: Performed by: EMERGENCY MEDICINE

## 2019-09-25 RX ORDER — ASPIRIN 325 MG
325 TABLET ORAL
Status: COMPLETED | OUTPATIENT
Start: 2019-09-25 | End: 2019-09-25

## 2019-09-25 RX ADMIN — ASPIRIN 325 MG: 325 TABLET, FILM COATED ORAL at 10:05

## 2019-09-25 NOTE — ED NOTES
I have reviewed discharge instructions with the patient. The patient verbalized understanding. Pt ambulated out of ED.

## 2019-09-25 NOTE — LETTER
Kathy Velez 53 
[address] [facility] [id number] [phone] Work/School Note Date: 9/25/2019 To Whom It May concern: 
 
Loyda Buchanan was seen and treated today in the emergency room by the following: Dr. Amalia Buchanan 9/27/2019 Sincerely, Ravinder Arndt RN

## 2019-09-25 NOTE — CONSULTS
Cardiology Consult Note    CC: CP  Reason for consult:  CP  Requesting MD:  Dr. Tasha Bustos:      Date of  Admission: 9/25/2019  8:43 AM     Admission type:Emergency    Ayanachilango Zhao is a 52 y.o. male is seen in ER for episode of chest pain this am; He was up and was not exerting this am when he developed sudden SS chest tightness rated 3/10 as compared to his previous STEMI in 10/17 which was 10/10. He felt slightly SOB and anxious. No sweats or dizziness or palpitations. No radiation of sx. He decided to present to ER despite it spontaneously resolved in one hour on its own with no medication. He just wanted to make sure he is not late in case of another MI. He denies having any recent exertional sx of CP, fatigue, or SOB. He works at [employer], walking and lifting all day long.       Patient Active Problem List    Diagnosis Date Noted    Chest pain 10/10/2017     Priority: 1 - One    Recurrent depression ([facility] [address] 12/21/2017    ST elevation (STEMI) myocardial infarction involving right coronary artery ([facility] [address] 10/10/2017    Anxiety 12/13/2016    Intractable chronic post-traumatic headache 12/13/2016    ADD (attention deficit disorder) 12/13/2016    Hypovitaminosis D 03/07/2016    Hypercholesteremia 03/07/2016      Alexandra Paige MD  Past Medical History:   Diagnosis Date    Anxiety disorder     CAD (coronary artery disease) 10/10/2017    STEMI and stents    Depression     Diarrhea     Falls     Headache     Joint pain     Joint pain     Memory disorder     Memory loss     Muscle pain     Muscle pain     Muscle weakness     Muscle weakness     Torn rotator cuff     right side    Vertigo     Visual disturbance     Visual disturbance       Past Surgical History:   Procedure Laterality Date    HX ORTHOPAEDIC  02/2017    rotator cuff repair    HX OTHER SURGICAL  2016    cervical radiculopathy     No Known Allergies   Family History   Problem Relation Age of Onset    Heart Disease Mother  Stroke Mother       Current Facility-Administered Medications   Medication Dose Route Frequency    aspirin tablet 325 mg  325 mg Oral NOW     Current Outpatient Medications   Medication Sig    erenumab-aooe (AIMOVIG AUTOINJECTOR) 70 mg/mL injection 1 mL by SubCUTAneous route every month.  gabapentin (NEURONTIN) 600 mg tablet TAKE ONE(1) TABLET BY MOUTH THREE(3) TIMES DAILY    rosuvastatin (CRESTOR) 20 mg tablet TAKE 1 TABLET BY MOUTH EVERY DAY IN THE EVENING    clopidogrel (PLAVIX) 75 mg tab TAKE 1 TABLET BY ORAL ROUTE EVERY DAY    ARIPiprazole (ABILIFY) 2 mg tablet Take  by mouth daily.  aspirin 81 mg chewable tablet Take 1 Tab by mouth daily.  metoprolol tartrate (LOPRESSOR) 25 mg tablet Take 1 Tab by mouth every twelve (12) hours.  traZODone (DESYREL) 50 mg tablet Take 50 mg by mouth nightly.  sertraline (ZOLOFT) 100 mg tablet Take 100 mg by mouth daily. Prior to Admission Medications:  Prior to Admission medications    Medication Sig Start Date End Date Taking? Authorizing Provider   erenumab-aooe (AIMOVIG AUTOINJECTOR) 70 mg/mL injection 1 mL by SubCUTAneous route every month. 8/30/19   Janey Stanley MD   gabapentin (NEURONTIN) 600 mg tablet TAKE ONE(1) TABLET BY MOUTH THREE(3) TIMES DAILY 5/29/19   Janey Stanley MD   rosuvastatin (CRESTOR) 20 mg tablet TAKE 1 TABLET BY MOUTH EVERY DAY IN THE EVENING 2/28/19   Pola Rodriguez MD   clopidogrel (PLAVIX) 75 mg tab TAKE 1 TABLET BY ORAL ROUTE EVERY DAY 12/8/17   Provider, Historical   ARIPiprazole (ABILIFY) 2 mg tablet Take  by mouth daily. Provider, Historical   aspirin 81 mg chewable tablet Take 1 Tab by mouth daily. 10/12/17   Daly Marrufo MD   metoprolol tartrate (LOPRESSOR) 25 mg tablet Take 1 Tab by mouth every twelve (12) hours. 10/12/17   Tyrone Anderson MD   traZODone (DESYREL) 50 mg tablet Take 50 mg by mouth nightly. Provider, Historical   sertraline (ZOLOFT) 100 mg tablet Take 100 mg by mouth daily.     Provider, Historical        Review of Symptoms:  Except as noted in HPI, patient denies recent fever or chills, nausea, vomiting, diarrhea, hemoptysis, hematemesis, dysuria, myalgias, focal neurologic symptoms, ecchymosis, angioedema, odynophagia, dysphagia, sore throat, earache,rash, melena, hematochezia, depression, GERD, cold intolerance, petechia, bleeding gums, or significant weight loss. A comprehensive review of systems was negative except for that written in the HPI. Subjective:    24 hr VS reviewed, overall VSSAF  Temp (24hrs), Av.9 °F (36.6 °C), Min:97.9 °F (36.6 °C), Max:97.9 °F (36.6 °C)    Patient Vitals for the past 8 hrs:   Pulse   19 1000 (!) 51   19 0858 (!) 56   19 0844 72    Patient Vitals for the past 8 hrs:   Resp   19 1000 11   19 0858 16    Patient Vitals for the past 8 hrs:   BP   19 1000 133/85   19 0858 (!) 146/92        No intake or output data in the 24 hours ending 19 1003      Physical Exam (complete single organ system exam)      Visit Vitals  /85   Pulse (!) 51   Temp 97.9 °F (36.6 °C)   Resp 11   SpO2 99%     General Appearance:  Well developed, well nourished,alert and oriented x 3, and individual in no acute distress. Ears/Nose/Mouth/Throat:   Hearing grossly normal.         Neck: Supple. Chest:   Lungs clear to auscultation bilaterally. Cardiovascular:  Regular rate and rhythm, S1, S2 normal, no murmur. Abdomen:   Soft, non-tender, bowel sounds are active. Extremities: No edema bilaterally. Skin: Warm and dry.                Cardiographics    Telemetry: normal sinus rhythm  ECG: normal EKG, normal sinus rhythm, unchanged from previous tracings  Echocardiogram: Not done    Labs:   Recent Results (from the past 24 hour(s))   CBC WITH AUTOMATED DIFF    Collection Time: 19  8:47 AM   Result Value Ref Range    WBC 5.3 4.1 - 11.1 K/uL    RBC 4.81 4.10 - 5.70 M/uL    HGB 14.0 12.1 - 17.0 g/dL    HCT 43.1 36.6 - 50.3 %    MCV 89.6 80.0 - 99.0 FL    MCH 29.1 26.0 - 34.0 PG    MCHC 32.5 30.0 - 36.5 g/dL    RDW 13.6 11.5 - 14.5 %    PLATELET 898 438 - 251 K/uL    MPV 8.9 8.9 - 12.9 FL    NRBC 0.0 0  WBC    ABSOLUTE NRBC 0.00 0.00 - 0.01 K/uL    NEUTROPHILS 65 32 - 75 %    LYMPHOCYTES 24 12 - 49 %    MONOCYTES 7 5 - 13 %    EOSINOPHILS 3 0 - 7 %    BASOPHILS 1 0 - 1 %    IMMATURE GRANULOCYTES 0 0.0 - 0.5 %    ABS. NEUTROPHILS 3.5 1.8 - 8.0 K/UL    ABS. LYMPHOCYTES 1.3 0.8 - 3.5 K/UL    ABS. MONOCYTES 0.4 0.0 - 1.0 K/UL    ABS. EOSINOPHILS 0.2 0.0 - 0.4 K/UL    ABS. BASOPHILS 0.0 0.0 - 0.1 K/UL    ABS. IMM. GRANS. 0.0 0.00 - 0.04 K/UL    DF AUTOMATED     METABOLIC PANEL, COMPREHENSIVE    Collection Time: 09/25/19  8:47 AM   Result Value Ref Range    Sodium 142 136 - 145 mmol/L    Potassium 3.7 3.5 - 5.1 mmol/L    Chloride 109 (H) 97 - 108 mmol/L    CO2 28 21 - 32 mmol/L    Anion gap 5 5 - 15 mmol/L    Glucose 98 65 - 100 mg/dL    BUN 14 6 - 20 MG/DL    Creatinine 1.02 0.70 - 1.30 MG/DL    BUN/Creatinine ratio 14 12 - 20      GFR est AA >60 >60 ml/min/1.73m2    GFR est non-AA >60 >60 ml/min/1.73m2    Calcium 9.0 8.5 - 10.1 MG/DL    Bilirubin, total 1.1 (H) 0.2 - 1.0 MG/DL    ALT (SGPT) 24 12 - 78 U/L    AST (SGOT) 20 15 - 37 U/L    Alk. phosphatase 74 45 - 117 U/L    Protein, total 7.2 6.4 - 8.2 g/dL    Albumin 3.9 3.5 - 5.0 g/dL    Globulin 3.3 2.0 - 4.0 g/dL    A-G Ratio 1.2 1.1 - 2.2     TROPONIN I    Collection Time: 09/25/19  8:47 AM   Result Value Ref Range    Troponin-I, Qt. <0.05 <0.05 ng/mL   SAMPLES BEING HELD    Collection Time: 09/25/19  8:47 AM   Result Value Ref Range    SAMPLES BEING HELD 1RED 1BLU     COMMENT        Add-on orders for these samples will be processed based on acceptable specimen integrity and analyte stability, which may vary by analyte.    EKG, 12 LEAD, INITIAL    Collection Time: 09/25/19  8:47 AM   Result Value Ref Range    Ventricular Rate 55 BPM    Atrial Rate 55 BPM    P-R Interval 168 ms QRS Duration 82 ms    Q-T Interval 424 ms    QTC Calculation (Bezet) 405 ms    Calculated P Axis 44 degrees    Calculated R Axis 39 degrees    Calculated T Axis 33 degrees    Diagnosis       Sinus bradycardia  When compared with ECG of 08-MAY-2019 15:35,  No significant change was found          Assessment:     Assessment:   CP; atypical and his ekg and enzyme are negative  CAD; s/p stents in RCA/LAD in 10/17  H/o STEMI; due to RCA occlusion  HTN        Plan:   Stress cardiolite test this am and if negative DC pt home on current regimen    Pravin Dunbar MD

## 2019-09-25 NOTE — ED PROVIDER NOTES
52 y.o. male with past medical history significant for anxiety, memory disorder, CAD with h/o STEMI, s/p PCI, presents ambulatory to the ED with chief complaint of chest pain. Patient states that he felt the onset of substernal, non-radiating chest pain at 0630 this morning. He describes the pain as a \"tightness\" and \"pressure\" in the center of his chest, and he notes the pain resolved spontaneously approximately one hour after it started. Patient denies having any CP in the ED at this time, and he notes that when his ED EKG was performed he did not have pain then either. He denies taking any ASA today before coming to the ED. Patient reports a history of MI in the past, but states that his pain today was \"not as bad\" and it was a \"different\" kind of pain than the chest pain he experienced with his previous MI. Patient specifically denies shortness of breath, abdominal pain, diaphoresis, nausea, vomiting, and leg swelling. There are no other acute medical concerns at this time. Social hx: Denies Tobacco use; Denies EtOH use; Denies Illicit Drug Abuse    PCP: Mega Oglesby MD  Cardiology: Dr. Merlinda Tangirnaq     Note written by Anne Marie Senior. Priyanka Castrejon, as dictated by Hussein Valdez MD 9:06 AM     The history is provided by the patient and medical records. No  was used.         Past Medical History:   Diagnosis Date    Anxiety disorder     CAD (coronary artery disease) 10/10/2017    STEMI and stents    Depression     Diarrhea     Falls     Headache     Joint pain     Joint pain     Memory disorder     Memory loss     Muscle pain     Muscle pain     Muscle weakness     Muscle weakness     Torn rotator cuff     right side    Vertigo     Visual disturbance     Visual disturbance        Past Surgical History:   Procedure Laterality Date    HX ORTHOPAEDIC  02/2017    rotator cuff repair    HX OTHER SURGICAL  2016    cervical radiculopathy         Family History:   Problem Relation Age of Onset    Heart Disease Mother     Stroke Mother        Social History     Socioeconomic History    Marital status:      Spouse name: Not on file    Number of children: Not on file    Years of education: Not on file    Highest education level: Not on file   Occupational History    Not on file   Social Needs    Financial resource strain: Not on file    Food insecurity:     Worry: Not on file     Inability: Not on file    Transportation needs:     Medical: Not on file     Non-medical: Not on file   Tobacco Use    Smoking status: Never Smoker    Smokeless tobacco: Never Used   Substance and Sexual Activity    Alcohol use: No    Drug use: No    Sexual activity: Yes   Lifestyle    Physical activity:     Days per week: Not on file     Minutes per session: Not on file    Stress: Not on file   Relationships    Social connections:     Talks on phone: Not on file     Gets together: Not on file     Attends [employer] service: Not on file     Active member of club or organization: Not on file     Attends meetings of clubs or organizations: Not on file     Relationship status: Not on file    Intimate partner violence:     Fear of current or ex partner: Not on file     Emotionally abused: Not on file     Physically abused: Not on file     Forced sexual activity: Not on file   Other Topics Concern    Not on file   Social History Narrative    Not on file         ALLERGIES: Patient has no known allergies. Review of Systems   Constitutional: Negative for diaphoresis. Respiratory: Negative for shortness of breath. Cardiovascular: Positive for chest pain (resolved). Negative for leg swelling. Gastrointestinal: Negative for abdominal pain, nausea and vomiting. All other systems reviewed and are negative.       Vitals:    09/25/19 0844 09/25/19 0858 09/25/19 1000 09/25/19 1130   BP:  (!) 146/92 133/85 142/88   Pulse: 72 (!) 56 (!) 51 (!) 57   Resp:  16 11 19   Temp:  97.9 °F (36.6 °C) SpO2: 97% 98% 99% 100%            Physical Exam   Constitutional: He appears well-developed and well-nourished. No distress. HENT:   Head: Normocephalic and atraumatic. Eyes: Conjunctivae are normal.   Neck: Neck supple. No tracheal deviation present. Cardiovascular: Regular rhythm and normal heart sounds. Bradycardia present. Pulmonary/Chest: Effort normal and breath sounds normal. No respiratory distress. Abdominal: He exhibits no distension. Musculoskeletal: Normal range of motion. He exhibits no deformity. Neurological: He is alert. No cranial nerve deficit. Skin: Skin is warm and dry. Psychiatric: His behavior is normal.   Nursing note and vitals reviewed. Note written by Dang Reaves. Cristy Carlson, as dictated by Hill Crystal MD 9:06 AM      MDM     68-year-old male presents with chest pain that started when he woke this morning at 6:30 AM.  He experienced pressure for around an hour before it spontaneously subsided. He did not take anything for his symptoms prior to arrival.  On arrival here his EKG is nonischemic but he is not experiencing active pain. First troponin is negative. No significant hematologic or metabolic abnormalities to explain symptoms. Will discuss with cardiology due to high risk demographic as he has history of an ST elevation MI requiring RCA stent 2 years ago. Dr. Pete Yuen came to see the patient in the emergency department and ordered stress echo. Per his note he recommends discharge home if low risk. Results showed no concerning findings for acute ischemia, repeat troponin negative, will discharge with routine outpatient follow-up and return precautions discussed for worsening or new concerning symptoms. Procedures    ED EKG interpretation:  Rhythm: sinus bradycardia; and regular . Rate (approx.): 55 bpm; ST/T wave: normal; no STEMI. Note written by Dang Reaves.  Millicent Tao, as dictated by Hill Crystal MD 9:06 AM     CONSULT NOTE:  10:03 AM Marcela Duarte MD spoke with Dr. Sandi Witt, Consult for Cardiology. Discussed available diagnostic tests and clinical findings. Dr. Sandi Witt is in the ED. He states that the order for the repeat troponin can be cancelled. Dr. Sandi Witt will take patient for a stress echo today. 2:08 PM  Patient's results have been reviewed with them. Patient and/or family have verbally conveyed their understanding and agreement of the patient's signs, symptoms, diagnosis, treatment and prognosis and additionally agree to follow up as recommended or return to the Emergency Room should their condition change prior to follow-up. Discharge instructions have also been provided to the patient with some educational information regarding their diagnosis as well a list of reasons why they would want to return to the ER prior to their follow-up appointment should their condition change.

## 2019-09-25 NOTE — ED TRIAGE NOTES
Pt c/o mid chest pain started one hour ago, denies radiating pain, +sob, denies n/v, denies diaphoresis, hx MI x 2

## 2019-09-26 LAB
ATRIAL RATE: 55 BPM
CALCULATED P AXIS, ECG09: 44 DEGREES
CALCULATED R AXIS, ECG10: 39 DEGREES
CALCULATED T AXIS, ECG11: 33 DEGREES
DIAGNOSIS, 93000: NORMAL
P-R INTERVAL, ECG05: 168 MS
Q-T INTERVAL, ECG07: 424 MS
QRS DURATION, ECG06: 82 MS
QTC CALCULATION (BEZET), ECG08: 405 MS
VENTRICULAR RATE, ECG03: 55 BPM

## 2019-10-03 ENCOUNTER — OFFICE VISIT (OUTPATIENT)
Dept: INTERNAL MEDICINE CLINIC | Age: 54
End: 2019-10-03

## 2019-10-03 VITALS
OXYGEN SATURATION: 96 % | WEIGHT: 177.1 LBS | BODY MASS INDEX: 29.51 KG/M2 | TEMPERATURE: 97.9 F | DIASTOLIC BLOOD PRESSURE: 84 MMHG | RESPIRATION RATE: 16 BRPM | HEIGHT: 65 IN | HEART RATE: 58 BPM | SYSTOLIC BLOOD PRESSURE: 120 MMHG

## 2019-10-03 DIAGNOSIS — I10 BENIGN ESSENTIAL HTN: ICD-10-CM

## 2019-10-03 DIAGNOSIS — E78.00 HYPERCHOLESTEREMIA: ICD-10-CM

## 2019-10-03 DIAGNOSIS — J06.9 VIRAL UPPER RESPIRATORY TRACT INFECTION: Primary | ICD-10-CM

## 2019-10-03 DIAGNOSIS — R73.9 ELEVATED BLOOD SUGAR: ICD-10-CM

## 2019-10-03 RX ORDER — CODEINE PHOSPHATE AND GUAIFENESIN 10; 100 MG/5ML; MG/5ML
5 SOLUTION ORAL
Qty: 180 ML | Refills: 0 | Status: SHIPPED | OUTPATIENT
Start: 2019-10-03 | End: 2019-10-06

## 2019-10-03 RX ORDER — AZITHROMYCIN 250 MG/1
TABLET, FILM COATED ORAL
Qty: 6 TAB | Refills: 0 | Status: SHIPPED | OUTPATIENT
Start: 2019-10-03 | End: 2019-10-08

## 2019-10-03 NOTE — PROGRESS NOTES
Chief Complaint   Patient presents with    Nasal Congestion     Patientis here for a cold. 52 y.o. male with sore throat, myalgias, swollen glands, headache and fever for 0 days. No history of rheumatic fever. Other symptoms: congestion, sneezing, sore throat, nasal blockage and post nasal drip. Fever -no  Exudative pharyngitis-no  Lymphadenopathy-no  Absence of cough-no    Patient also has taken blood pressure medication and that his blood pressure been doing well. He did have a recent episode of chest pain which is resolved and has had no pain since. Patient states that he has been eating healthy. Would like to get a follow-up on his sugar levels also today. Reviewed and agree with Nurse Note and duplicated in this note. Reviewed PmHx, RxHx, FmHx, SocHx, AllgHx and updated and dated in the chart.     Family History   Problem Relation Age of Onset    Heart Disease Mother     Stroke Mother        Past Medical History:   Diagnosis Date    Anxiety disorder     CAD (coronary artery disease) 10/10/2017    STEMI and stents    Depression     Diarrhea     Falls     Headache     Joint pain     Joint pain     Memory disorder     Memory loss     Muscle pain     Muscle pain     Muscle weakness     Muscle weakness     Torn rotator cuff     right side    Vertigo     Visual disturbance     Visual disturbance       Social History     Socioeconomic History    Marital status:      Spouse name: Not on file    Number of children: Not on file    Years of education: Not on file    Highest education level: Not on file   Tobacco Use    Smoking status: Never Smoker    Smokeless tobacco: Never Used   Substance and Sexual Activity    Alcohol use: No    Drug use: No    Sexual activity: Yes        Review of Systems - negative except as listed above      Objective:     Vitals:    10/03/19 1003   BP: 120/84   Pulse: (!) 58   Resp: 16   Temp: 97.9 °F (36.6 °C)   SpO2: 96%   Weight: 177 lb 1.6 oz (80.3 kg)   Height: 5' 5\" (1.651 m)       Physical Examination: General appearance - alert, well appearing, and in no distress  Eyes - pupils equal and reactive, extraocular eye movements intact  Ears - bilateral TM's and external ear canals normal  Nose - mucosal congestion and mucosal erythema  Mouth - mucous membranes moist, pharynx normal without lesions  Neck - supple, no significant adenopathy  Chest - clear to auscultation, no wheezes, rales or rhonchi, symmetric air entry  Heart - normal rate, regular rhythm, normal S1, S2, no murmurs, rubs, clicks or gallops  Abdomen - soft, nontender, nondistended, no masses or organomegaly  Extremities - peripheral pulses normal, no pedal edema, no clubbing or cyanosis  Skin - normal coloration and turgor, no rashes, no suspicious skin lesions noted    Assessment/ Plan:   Diagnoses and all orders for this visit:    1. Viral upper respiratory tract infection  -     guaiFENesin-codeine (ROBITUSSIN AC) 100-10 mg/5 mL solution; Take 5 mL by mouth three (3) times daily as needed for Cough for up to 3 days. Max Daily Amount: 15 mL. 2. Elevated blood sugar  -     HEMOGLOBIN A1C WITH EAG    3. Benign essential HTN  -     METABOLIC PANEL, COMPREHENSIVE    4. Hypercholesteremia  -     LIPID PANEL    Other orders  -     azithromycin (ZITHROMAX) 250 mg tablet; Take 2 tablets today, then take 1 tablet daily             1) Remember to stay active and/or exercise regularly (I suggest 30-45 minutes daily)   2) For reliable dietary information, go to www. EATRIGHT.org. You may wish to consider seeing the nutritionist at [facility] [phone], also consider the [address] [facility]. 3) I routinely suggest a complete physical exam once each year (your birth month)  I have discussed the diagnosis with the patient and the intended plan as seen in the above orders. The patient has received an after-visit summary and questions were answered concerning future plans.      Medication Side Effects and Warnings were discussed with patient: yes  Patient Labs were reviewed and or requested: yes  Patient Past Records were reviewed and or requested  yes  I have discussed the diagnosis with the patient and the intended plan as seen in the above orders. Pt agrees to call or return to clinic and/or go to closest ER with any worsening of symptoms. This may include, but not limited to increased fever (>100.4) with NSAIDS or Tylenol, increased edema, confusion, rash, worsening of presenting symptoms.

## 2019-10-04 LAB
ALBUMIN SERPL-MCNC: 4.2 G/DL (ref 3.5–5.5)
ALBUMIN/GLOB SERPL: 1.7 {RATIO} (ref 1.2–2.2)
ALP SERPL-CCNC: 67 IU/L (ref 39–117)
ALT SERPL-CCNC: 13 IU/L (ref 0–44)
AST SERPL-CCNC: 21 IU/L (ref 0–40)
BILIRUB SERPL-MCNC: 0.8 MG/DL (ref 0–1.2)
BUN SERPL-MCNC: 13 MG/DL (ref 6–24)
BUN/CREAT SERPL: 13 (ref 9–20)
CALCIUM SERPL-MCNC: 9.7 MG/DL (ref 8.7–10.2)
CHLORIDE SERPL-SCNC: 103 MMOL/L (ref 96–106)
CHOLEST SERPL-MCNC: 168 MG/DL (ref 100–199)
CO2 SERPL-SCNC: 25 MMOL/L (ref 20–29)
CREAT SERPL-MCNC: 1.01 MG/DL (ref 0.76–1.27)
EST. AVERAGE GLUCOSE BLD GHB EST-MCNC: 123 MG/DL
GLOBULIN SER CALC-MCNC: 2.5 G/DL (ref 1.5–4.5)
GLUCOSE SERPL-MCNC: 86 MG/DL (ref 65–99)
HBA1C MFR BLD: 5.9 % (ref 4.8–5.6)
HDLC SERPL-MCNC: 63 MG/DL
LDLC SERPL CALC-MCNC: 87 MG/DL (ref 0–99)
POTASSIUM SERPL-SCNC: 4.2 MMOL/L (ref 3.5–5.2)
PROT SERPL-MCNC: 6.7 G/DL (ref 6–8.5)
SODIUM SERPL-SCNC: 141 MMOL/L (ref 134–144)
TRIGL SERPL-MCNC: 88 MG/DL (ref 0–149)
VLDLC SERPL CALC-MCNC: 18 MG/DL (ref 5–40)

## 2019-10-04 NOTE — PROGRESS NOTES
Sugars are prediabetic but consistent with previous draws.   Please continue work on diet and exercise

## 2019-12-03 DIAGNOSIS — G44.321 INTRACTABLE CHRONIC POST-TRAUMATIC HEADACHE: ICD-10-CM

## 2019-12-03 DIAGNOSIS — G43.709 CHRONIC MIGRAINE WITHOUT AURA WITHOUT STATUS MIGRAINOSUS, NOT INTRACTABLE: ICD-10-CM

## 2019-12-04 RX ORDER — GABAPENTIN 600 MG/1
TABLET ORAL
Qty: 270 TAB | Refills: 1 | Status: SHIPPED | OUTPATIENT
Start: 2019-12-04 | End: 2022-04-01

## 2020-01-06 DIAGNOSIS — G43.709 CHRONIC MIGRAINE WITHOUT AURA WITHOUT STATUS MIGRAINOSUS, NOT INTRACTABLE: ICD-10-CM

## 2020-01-06 DIAGNOSIS — G44.321 INTRACTABLE CHRONIC POST-TRAUMATIC HEADACHE: ICD-10-CM

## 2020-01-06 NOTE — TELEPHONE ENCOUNTER
----- Message from Molly Machuca sent at 1/6/2020 11:42 AM EST -----  Regarding: Dr. Ruby Alert first and last name: [facility] [employer] office      Reason for call: Rx refill request for \"Aimovig\" sent to [facility] required yes/no and why: yes      Best contact number(s): [phone]      Details to clarify the request:      Molly Machuca

## 2020-01-06 NOTE — TELEPHONE ENCOUNTER
Requested Prescriptions     Pending Prescriptions Disp Refills    erenumab-aooe (AIMOVIG AUTOINJECTOR) 70 mg/mL injection 1 Syringe 3     Si mL by SubCUTAneous route every month. Please call pt's wife when sent.

## 2020-01-09 ENCOUNTER — OFFICE VISIT (OUTPATIENT)
Dept: INTERNAL MEDICINE CLINIC | Age: 55
End: 2020-01-09

## 2020-01-09 VITALS
OXYGEN SATURATION: 97 % | DIASTOLIC BLOOD PRESSURE: 67 MMHG | HEART RATE: 48 BPM | WEIGHT: 183 LBS | RESPIRATION RATE: 16 BRPM | BODY MASS INDEX: 30.49 KG/M2 | SYSTOLIC BLOOD PRESSURE: 107 MMHG | HEIGHT: 65 IN | TEMPERATURE: 97.7 F

## 2020-01-09 DIAGNOSIS — Z13.39 SCREENING FOR ALCOHOLISM: ICD-10-CM

## 2020-01-09 DIAGNOSIS — R73.9 ELEVATED BLOOD SUGAR: ICD-10-CM

## 2020-01-09 DIAGNOSIS — Z00.00 MEDICARE ANNUAL WELLNESS VISIT, SUBSEQUENT: Primary | ICD-10-CM

## 2020-01-09 DIAGNOSIS — Z12.5 SPECIAL SCREENING FOR MALIGNANT NEOPLASM OF PROSTATE: ICD-10-CM

## 2020-01-09 DIAGNOSIS — F33.9 RECURRENT DEPRESSION (HCC): ICD-10-CM

## 2020-01-09 DIAGNOSIS — E78.00 HYPERCHOLESTEREMIA: ICD-10-CM

## 2020-01-09 DIAGNOSIS — Z13.6 SCREENING FOR ISCHEMIC HEART DISEASE: ICD-10-CM

## 2020-01-09 DIAGNOSIS — Z13.31 SCREENING FOR DEPRESSION: ICD-10-CM

## 2020-01-09 NOTE — PATIENT INSTRUCTIONS
Medicare Wellness Visit, Male    The best way to live healthy is to have a lifestyle where you eat a well-balanced diet, exercise regularly, limit alcohol use, and quit all forms of tobacco/nicotine, if applicable. Regular preventive services are another way to keep healthy. Preventive services (vaccines, screening tests, monitoring & exams) can help personalize your care plan, which helps you manage your own care. Screening tests can find health problems at the earliest stages, when they are easiest to treat. [facility] follows the current, evidence-based guidelines published by the [facility] ([facility]) when recommending preventive services for our patients. Because we follow these guidelines, sometimes recommendations change over time as research supports it. (For example, a prostate screening blood test is no longer routinely recommended for men with no symptoms). Of course, you and your doctor may decide to screen more often for some diseases, based on your risk and co-morbidities (chronic disease you are already diagnosed with). Preventive services for you include:  - Medicare offers their members a free annual wellness visit, which is time for you and your primary care provider to discuss and plan for your preventive service needs. Take advantage of this benefit every year!  -All adults over age 72 should receive the recommended pneumonia vaccines. Current USPSTF guidelines recommend a series of two vaccines for the best pneumonia protection.   -All adults should have a flu vaccine yearly and tetanus vaccine every 10 years.  -All adults age 48 and older should receive the shingles vaccines (series of two vaccines).        -All adults age 38-68 who are overweight should have a diabetes screening test once every three years.   -Other screening tests & preventive services for persons with diabetes include: an eye exam to screen for diabetic retinopathy, a kidney function test, a foot exam, and stricter control over your cholesterol.   -Cardiovascular screening for adults with routine risk involves an electrocardiogram (ECG) at intervals determined by the provider.   -Colorectal cancer screening should be done for adults age 54-65 with no increased risk factors for colorectal cancer. There are a number of acceptable methods of screening for this type of cancer. Each test has its own benefits and drawbacks. Discuss with your provider what is most appropriate for you during your annual wellness visit. The different tests include: colonoscopy (considered the best screening method), a fecal occult blood test, a fecal DNA test, and sigmoidoscopy.  -All adults born between [facility] should be screened once for Hepatitis C.  -An Abdominal Aortic Aneurysm (AAA) Screening is recommended for men age 73-68 who has ever smoked in their lifetime.      Here is a list of your current Health Maintenance items (your personalized list of preventive services) with a due date:  Health Maintenance Due   Topic Date Due    Shingles Vaccine (1 of 2) 12/19/2013    Stool testing for trace blood  12/21/2018    Flu Vaccine  08/01/2019    Annual Well Visit  08/25/2019

## 2020-01-09 NOTE — PROGRESS NOTES
This is the Subsequent Medicare Annual Wellness Exam, performed 12 months or more after the Initial AWV or the last Subsequent AWV    I have reviewed the patient's medical history in detail and updated the computerized patient record. History     Patient Active Problem List   Diagnosis Code    Hypovitaminosis D E55.9    Hypercholesteremia E78.00    Anxiety F41.9    Intractable chronic post-traumatic headache G44.321    ADD (attention deficit disorder) F98.8    Chest pain R07.9    ST elevation (STEMI) myocardial infarction involving right coronary artery (HCC) I21.11    Recurrent depression ([address] F33.9     Past Medical History:   Diagnosis Date    Anxiety disorder     CAD (coronary artery disease) 10/10/2017    STEMI and stents    Depression     Diarrhea     Falls     Headache     Joint pain     Joint pain     Memory disorder     Memory loss     Muscle pain     Muscle pain     Muscle weakness     Muscle weakness     Torn rotator cuff     right side    Vertigo     Visual disturbance     Visual disturbance       Past Surgical History:   Procedure Laterality Date    HX ORTHOPAEDIC  02/2017    rotator cuff repair    HX OTHER SURGICAL  2016    cervical radiculopathy     Current Outpatient Medications   Medication Sig Dispense Refill    erenumab-aooe (AIMOVIG AUTOINJECTOR) 70 mg/mL injection 1 mL by SubCUTAneous route every month. 1 Syringe 3    gabapentin (NEURONTIN) 600 mg tablet TAKE ONE(1) TABLET BY MOUTH THREE(3) TIMES DAILY 270 Tab 1    rosuvastatin (CRESTOR) 20 mg tablet TAKE 1 TABLET BY MOUTH EVERY DAY IN THE EVENING 30 Tab 6    clopidogrel (PLAVIX) 75 mg tab TAKE 1 TABLET BY ORAL ROUTE EVERY DAY  5    ARIPiprazole (ABILIFY) 2 mg tablet Take  by mouth daily.  aspirin 81 mg chewable tablet Take 1 Tab by mouth daily. 30 Tab 6    metoprolol tartrate (LOPRESSOR) 25 mg tablet Take 1 Tab by mouth every twelve (12) hours.  60 Tab 6    traZODone (DESYREL) 50 mg tablet Take 50 mg by mouth nightly.  sertraline (ZOLOFT) 100 mg tablet Take 100 mg by mouth daily. No Known Allergies    Family History   Problem Relation Age of Onset    Heart Disease Mother     Stroke Mother      Social History     Tobacco Use    Smoking status: Never Smoker    Smokeless tobacco: Never Used   Substance Use Topics    Alcohol use: No       Depression Risk Factor Screening:     3 most recent PHQ Screens 9/24/2018   Little interest or pleasure in doing things Not at all   Feeling down, depressed, irritable, or hopeless Not at all   Total Score PHQ 2 0   Trouble falling or staying asleep, or sleeping too much Not at all   Feeling tired or having little energy Not at all   Poor appetite, weight loss, or overeating Not at all   Feeling bad about yourself - or that you are a failure or have let yourself or your family down Not at all   Trouble concentrating on things such as school, work, reading, or watching TV Not at all   Moving or speaking so slowly that other people could have noticed; or the opposite being so fidgety that others notice Not at all   Thoughts of being better off dead, or hurting yourself in some way Not at all   PHQ 9 Score 0       Alcohol Risk Factor Screening (MALE < 65): Do you average more than 2 drinks per night or 14 drinks a week: No    On any one occasion in the past three months have you have had more than 4 drinks containing alcohol:  No      Functional Ability and Level of Safety:   Hearing: Hearing is good. Activities of Daily Living: The home contains: no safety equipment. Patient does total self care    Ambulation: with no difficulty    Fall Risk:  Fall Risk Assessment, last 12 mths 10/19/2017   Able to walk? Yes   Fall in past 12 months?  No       Abuse Screen:  Patient is not abused    Cognitive Screening   Has your family/caregiver stated any concerns about your memory: no  Cognitive Screening: Normal - MMSE (Mini Mental Status Exam)    Patient Care Team   Patient Care Team:  Lady Jadon MD as PCP - [facility])  Lady Jadon MD as PCP - [facility] Empaneled Provider  Pascale John PsyD (Psychology)    Assessment/Plan   Education and counseling provided:  Are appropriate based on today's review and evaluation    Diagnoses and all orders for this visit:    1. Medicare annual wellness visit, subsequent  -     [place] ANNUAL ALCOHOL SCREEN 15 MIN    2. Screening for alcoholism  -     [place] ANNUAL ALCOHOL SCREEN 15 MIN    3. Screening for depression  -     DEPRESSION SCREEN ANNUAL    4. Screening for ischemic heart disease  -     LIPID PANEL    5. Special screening for malignant neoplasm of prostate  -     PSA SCREENING (SCREENING)    6. Hypercholesteremia  -     METABOLIC PANEL, COMPREHENSIVE    7. Elevated blood sugar  -     HEMOGLOBIN A1C WITH EAG    8.  Recurrent depression [facility])        Health Maintenance Due   Topic Date Due    Shingrix Vaccine Age 50> (1 of 2) 12/19/2013    FOBT Q 1 YEAR AGE 50-75  12/21/2018    Influenza Age 9 to Adult  08/01/2019    MEDICARE YEARLY EXAM  08/25/2019

## 2020-01-10 LAB
ALBUMIN SERPL-MCNC: 4.1 G/DL (ref 3.5–5.5)
ALBUMIN/GLOB SERPL: 1.7 {RATIO} (ref 1.2–2.2)
ALP SERPL-CCNC: 71 IU/L (ref 39–117)
ALT SERPL-CCNC: 18 IU/L (ref 0–44)
AST SERPL-CCNC: 20 IU/L (ref 0–40)
BILIRUB SERPL-MCNC: 0.9 MG/DL (ref 0–1.2)
BUN SERPL-MCNC: 17 MG/DL (ref 6–24)
BUN/CREAT SERPL: 15 (ref 9–20)
CALCIUM SERPL-MCNC: 9.1 MG/DL (ref 8.7–10.2)
CHLORIDE SERPL-SCNC: 104 MMOL/L (ref 96–106)
CHOLEST SERPL-MCNC: 163 MG/DL (ref 100–199)
CO2 SERPL-SCNC: 22 MMOL/L (ref 20–29)
CREAT SERPL-MCNC: 1.15 MG/DL (ref 0.76–1.27)
EST. AVERAGE GLUCOSE BLD GHB EST-MCNC: 120 MG/DL
GLOBULIN SER CALC-MCNC: 2.4 G/DL (ref 1.5–4.5)
GLUCOSE SERPL-MCNC: 92 MG/DL (ref 65–99)
HBA1C MFR BLD: 5.8 % (ref 4.8–5.6)
HDLC SERPL-MCNC: 62 MG/DL
LDLC SERPL CALC-MCNC: 86 MG/DL (ref 0–99)
POTASSIUM SERPL-SCNC: 4.3 MMOL/L (ref 3.5–5.2)
PROT SERPL-MCNC: 6.5 G/DL (ref 6–8.5)
PSA SERPL-MCNC: 1 NG/ML (ref 0–4)
SODIUM SERPL-SCNC: 142 MMOL/L (ref 134–144)
TRIGL SERPL-MCNC: 73 MG/DL (ref 0–149)
VLDLC SERPL CALC-MCNC: 15 MG/DL (ref 5–40)

## 2020-02-04 ENCOUNTER — TELEPHONE (OUTPATIENT)
Dept: NEUROLOGY | Age: 55
End: 2020-02-04

## 2020-02-04 NOTE — TELEPHONE ENCOUNTER
Re: Joseph Lew approved    Approval rec'd from [employer] via [facility] from Plan  Request Reference Number: [id number]. AIMOVIG INJ 70MG/ML is approved through 05/04/2020. For further questions, call [phone].      Fax sent to [address] [place] [address]

## 2020-02-11 ENCOUNTER — TELEPHONE (OUTPATIENT)
Dept: NEUROLOGY | Age: 55
End: 2020-02-11

## 2020-02-11 NOTE — TELEPHONE ENCOUNTER
* Message from Teddy below:      ----- Message from Malik Patterson sent at 2/11/2020 10:15 AM EST -----  Regarding: Sammie Amaya MD/Telephone  General Message/Vendor Calls    Caller's first and last name:  Melinda Price     Reason for call: Pt's wife, Melinda Price is making a 2nd request to receive a callback to discuss concerning issues about the pt. She stated that his insurance denied the Aimovig Injections which was exteremly helpful for his headaches and condition, in the meantime he is taking Gabapentin but it's not helping. Pt doesn't feel well and is very sensitive to light. Also, Mrs. Taina Solis is requesting a sooner appt regarding this issue.        Callback required yes/no and why: Yes       Best contact number(s):[phone]        Details to clarify the request: n/a

## 2020-02-11 NOTE — TELEPHONE ENCOUNTER
----- Message from Heaven Torres sent at 2/11/2020 12:29 PM EST -----  Regarding: Dr. Buddy Stevenson  Pt returning a call regarding coming in tomorrow for an appt. Wanted to advise he will take the appt.  Contact is [phone]

## 2020-02-12 ENCOUNTER — OFFICE VISIT (OUTPATIENT)
Dept: NEUROLOGY | Age: 55
End: 2020-02-12

## 2020-02-12 VITALS
OXYGEN SATURATION: 98 % | BODY MASS INDEX: 30.49 KG/M2 | DIASTOLIC BLOOD PRESSURE: 108 MMHG | SYSTOLIC BLOOD PRESSURE: 150 MMHG | RESPIRATION RATE: 16 BRPM | WEIGHT: 183 LBS | HEIGHT: 65 IN | HEART RATE: 92 BPM

## 2020-02-12 DIAGNOSIS — G43.709 CHRONIC MIGRAINE WITHOUT AURA WITHOUT STATUS MIGRAINOSUS, NOT INTRACTABLE: Primary | ICD-10-CM
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
## 2020-02-12 NOTE — PATIENT INSTRUCTIONS
PRESCRIPTION REFILL POLICY [facility] Statement to Patients April 1, 2014 In an effort to ensure the large volume of patient prescription refills is processed in the most efficient and expeditious manner, we are asking our patients to assist us by calling your Pharmacy for all prescription refills, this will include also your  Mail Order Pharmacy. The pharmacy will contact our office electronically to continue the refill process. Please do not wait until the last minute to call your pharmacy. We need at least 48 hours (2days) to fill prescriptions. We also encourage you to call your pharmacy before going to  your prescription to make sure it is ready. With regard to controlled substance prescription refill requests (narcotic refills) that need to be picked up at our office, we ask your cooperation by providing us with at least 72 hours (3days) notice that you will need a refill. We will not refill narcotic prescription refill requests after 4:00pm on any weekday, Monday through Thursday, or after 2:00pm on Fridays, or on the weekends. We encourage everyone to explore another way of getting your prescription refill request processed using [employer], our patient web portal through our electronic medical record system. [employer] is an efficient and effective way to communicate your medication request directly to the office and  downloadable as an mahin on your smart phone . [employer] also features a review functionality that allows you to view your medication list as well as leave messages for your physician. Are you ready to get connected? If so please review the attatched instructions or speak to any of our staff to get you set up right away! Thank you so much for your cooperation. Should you have any questions please contact our Practice Administrator. The Physicians and Staff,  [facility]

## 2020-04-06 ENCOUNTER — TELEPHONE (OUTPATIENT)
Dept: NEUROLOGY | Age: 55
End: 2020-04-06

## 2020-04-06 NOTE — TELEPHONE ENCOUNTER
Jabier Thakkar (Santos: [id number])   Aimovig 70MG/ML auto-injectors   Form  [employer] Medicare Part D Electronic Prior Authorization Form (2017 NCPDP)   Created   22 hours ago   Sent to Plan   14 minutes ago   Plan Response   14 minutes ago   Submit Clinical Questions   13 minutes ago   Determination   Favorable   10 minutes ago   Message from Plan  Request Reference Number: [id number]. AIMOVIG INJ 70MG/ML is approved through 12/31/2020. For further questions, call [phone].

## 2020-04-09 ENCOUNTER — VIRTUAL VISIT (OUTPATIENT)
Dept: INTERNAL MEDICINE CLINIC | Age: 55
End: 2020-04-09

## 2020-04-09 DIAGNOSIS — E78.00 HYPERCHOLESTEREMIA: ICD-10-CM

## 2020-04-09 DIAGNOSIS — I10 BENIGN ESSENTIAL HTN: Primary | ICD-10-CM

## 2020-04-09 DIAGNOSIS — R73.9 ELEVATED BLOOD SUGAR: ICD-10-CM

## 2020-04-09 RX ORDER — INSULIN PUMP SYRINGE, 3 ML
EACH MISCELLANEOUS
Qty: 1 KIT | Refills: 0 | Status: SHIPPED | OUTPATIENT
Start: 2020-04-09 | End: 2021-01-13 | Stop reason: SDUPTHER

## 2020-04-09 RX ORDER — INSULIN PUMP SYRINGE, 3 ML
EACH MISCELLANEOUS
Qty: 1 KIT | Refills: 0 | Status: SHIPPED | OUTPATIENT
Start: 2020-04-09 | End: 2020-04-09 | Stop reason: SDUPTHER

## 2020-04-09 RX ORDER — ROSUVASTATIN CALCIUM 20 MG/1
TABLET, COATED ORAL
Qty: 30 TAB | Refills: 6 | Status: SHIPPED | OUTPATIENT
Start: 2020-04-09 | End: 2020-04-09 | Stop reason: SDUPTHER

## 2020-04-09 RX ORDER — ROSUVASTATIN CALCIUM 20 MG/1
TABLET, COATED ORAL
Qty: 30 TAB | Refills: 6 | Status: SHIPPED | OUTPATIENT
Start: 2020-04-09 | End: 2020-07-09

## 2020-04-09 NOTE — PROGRESS NOTES
Consent: Vincent Pavon, who was seen by synchronous (real-time) audio-video technology, and/or his healthcare decision maker, is aware that this patient-initiated, Telehealth encounter on 4/9/2020 is a billable service, with coverage as determined by his insurance carrier. He is aware that he may receive a bill and has provided verbal consent to proceed: Yes. Assessment & Plan:   Diagnoses and all orders for this visit:    1. Benign essential HTN    2. Elevated blood sugar    3. Hypercholesteremia    Other orders  -     Blood-Glucose Meter (OneTouch Verio Flex Start) monitoring kit; Test once a day  -     rosuvastatin (CRESTOR) 20 mg tablet; TAKE 1 TABLET BY MOUTH EVERY DAY IN THE EVENING          Follow-up and Dispositions    · Return in about 3 months (around 7/9/2020) for Diabetes Check, HTN. I spent at least 15 minutes with this established patient, and >50% of the time was spent counseling and/or coordinating care regarding htn  [id number]  Subjective:   Vincent Pavon is a 62 y.o. male who was seen for No chief complaint on file. Hypertension - well controlled on metoprolol. No headaches or dizziness. Not checking BP at home. Also taking Crestor for cholesterol problems. Patient is working on diet and exercise and currently has no chest pain RIVERA or shortness of breath. Patient is also monitoring his sugar levels and eating healthy. He has had elevated sugar levels in the past With his latest hemoglobin A1c done and January which was 5.8. We will recheck this in 3 months  Prior to Admission medications    Medication Sig Start Date End Date Taking?  Authorizing Provider   Blood-Glucose Meter (OneTouch Verio Flex Start) monitoring kit Test once a day 4/9/20  Yes Brook Tarango MD   rosuvastatin (CRESTOR) 20 mg tablet TAKE 1 TABLET BY MOUTH EVERY DAY IN THE EVENING 4/9/20  Yes Brook Tarango MD   OTHER Indications: John Pineda 20-10mg    Provider, Historical   erenumab-aooe (AIMOVIG AUTOINJECTOR) 70 mg/mL injection 1 mL by SubCUTAneous route every month. 1/6/20   Jeanie Calderon MD   gabapentin (NEURONTIN) 600 mg tablet TAKE ONE(1) TABLET BY MOUTH THREE(3) TIMES DAILY 12/4/19   Jeanie Calderon MD   clopidogrel (PLAVIX) 75 mg tab TAKE 1 TABLET BY ORAL ROUTE EVERY DAY 12/8/17   Provider, Historical   ARIPiprazole (ABILIFY) 2 mg tablet Take  by mouth daily. Provider, Historical   aspirin 81 mg chewable tablet Take 1 Tab by mouth daily. 10/12/17   Devan Marrufo MD   metoprolol tartrate (LOPRESSOR) 25 mg tablet Take 1 Tab by mouth every twelve (12) hours. 10/12/17   Jabier Hermosillo MD   traZODone (DESYREL) 50 mg tablet Take 50 mg by mouth nightly. Provider, Historical   sertraline (ZOLOFT) 100 mg tablet Take 100 mg by mouth daily. Provider, Historical     No Known Allergies    Patient Active Problem List    Diagnosis Date Noted    Chest pain 10/10/2017     Priority: 1 - One    Recurrent depression ([facility] [address] 12/21/2017    ST elevation (STEMI) myocardial infarction involving right coronary artery ([facility] [address] 10/10/2017    Anxiety 12/13/2016    Intractable chronic post-traumatic headache 12/13/2016    ADD (attention deficit disorder) 12/13/2016    Hypovitaminosis D 03/07/2016    Hypercholesteremia 03/07/2016     Current Outpatient Medications   Medication Sig Dispense Refill    Blood-Glucose Meter (OneTouch Verio Flex Start) monitoring kit Test once a day 1 Kit 0    rosuvastatin (CRESTOR) 20 mg tablet TAKE 1 TABLET BY MOUTH EVERY DAY IN THE EVENING 30 Tab 6    OTHER Indications: Nudexta 20-10mg      erenumab-aooe (AIMOVIG AUTOINJECTOR) 70 mg/mL injection 1 mL by SubCUTAneous route every month. 1 Syringe 3    gabapentin (NEURONTIN) 600 mg tablet TAKE ONE(1) TABLET BY MOUTH THREE(3) TIMES DAILY 270 Tab 1    clopidogrel (PLAVIX) 75 mg tab TAKE 1 TABLET BY ORAL ROUTE EVERY DAY  5    ARIPiprazole (ABILIFY) 2 mg tablet Take  by mouth daily.  aspirin 81 mg chewable tablet Take 1 Tab by mouth daily.  27 Tab 6    metoprolol tartrate (LOPRESSOR) 25 mg tablet Take 1 Tab by mouth every twelve (12) hours. 60 Tab 6    traZODone (DESYREL) 50 mg tablet Take 50 mg by mouth nightly.  sertraline (ZOLOFT) 100 mg tablet Take 100 mg by mouth daily. No Known Allergies  Past Medical History:   Diagnosis Date    Anxiety disorder     CAD (coronary artery disease) 10/10/2017    STEMI and stents    Depression     Diarrhea     Falls     Headache     Joint pain     Joint pain     Memory disorder     Memory loss     Muscle pain     Muscle pain     Muscle weakness     Muscle weakness     Torn rotator cuff     right side    Vertigo     Visual disturbance     Visual disturbance      Past Surgical History:   Procedure Laterality Date    HX ORTHOPAEDIC  02/2017    rotator cuff repair    HX OTHER SURGICAL  2016    cervical radiculopathy     Family History   Problem Relation Age of Onset    Heart Disease Mother     Stroke Mother        Review of Systems   Constitutional: Negative. HENT: Negative. Eyes: Negative. Respiratory: Negative. Cardiovascular: Negative. Gastrointestinal: Negative. Genitourinary: Negative. Musculoskeletal: Negative. Skin: Negative. Neurological: Negative. Endo/Heme/Allergies: Negative. Objective:   Vital Signs: (As obtained by patient/caregiver at home)  There were no vitals taken for this visit.      [INSTRUCTIONS:  \"[x]\" Indicates a positive item  \"[]\" Indicates a negative item  -- DELETE ALL ITEMS NOT EXAMINED]    Constitutional: [x] Appears well-developed and well-nourished [x] No apparent distress      [] Abnormal -     Mental status: [x] Alert and awake  [x] Oriented to person/place/time [x] Able to follow commands    [] Abnormal -     Eyes:   EOM    [x]  Normal    [] Abnormal -   Sclera  [x]  Normal    [] Abnormal -          Discharge [x]  None visible   [] Abnormal -     HENT: [x] Normocephalic, atraumatic  [] Abnormal -   [x] Mouth/Throat: Mucous membranes are moist    External Ears [x] Normal  [] Abnormal -    Neck: [x] No visualized mass [] Abnormal -     Pulmonary/Chest: [x] Respiratory effort normal   [x] No visualized signs of difficulty breathing or respiratory distress        [] Abnormal -      Musculoskeletal:   [x] Normal gait with no signs of ataxia         [x] Normal range of motion of neck        [] Abnormal -     Neurological:        [x] No Facial Asymmetry (Cranial nerve 7 motor function) (limited exam due to video visit)          [x] No gaze palsy        [] Abnormal -          Skin:        [x] No significant exanthematous lesions or discoloration noted on facial skin         [] Abnormal -            Psychiatric:       [x] Normal Affect [] Abnormal -        [x] No Hallucinations    Other pertinent observable physical exam findings:-        We discussed the expected course, resolution and complications of the diagnosis(es) in detail. Medication risks, benefits, costs, interactions, and alternatives were discussed as indicated. I advised him to contact the office if his condition worsens, changes or fails to improve as anticipated. He expressed understanding with the diagnosis(es) and plan. Frank Jeronimo is a 62 y.o. male being evaluated by a video visit encounter for concerns as above. A caregiver was present when appropriate. Due to this being a TeleHealth encounter (During [place] [address] public health emergency), evaluation of the following organ systems was limited: Vitals/Constitutional/EENT/Resp/CV/GI//MS/Neuro/Skin/Heme-Lymph-Imm. Pursuant to the emergency declaration under the [facility], [facility] waiver authority and the [employer] Act, this Virtual  Visit was conducted, with patient's (and/or legal guardian's) consent, to reduce the patient's risk of exposure to COVID-19 and provide necessary medical care.      Services were provided through a video synchronous discussion virtually to substitute for in-person clinic visit. Patient and provider were located at their individual homes.         Daniel Matson MD

## 2020-04-09 NOTE — PATIENT INSTRUCTIONS
DASH Diet: Care Instructions Your Care Instructions The DASH diet is an eating plan that can help lower your blood pressure. DASH stands for Dietary Approaches to Stop Hypertension. Hypertension is high blood pressure. The DASH diet focuses on eating foods that are high in calcium, potassium, and magnesium. These nutrients can lower blood pressure. The foods that are highest in these nutrients are fruits, vegetables, low-fat dairy products, nuts, seeds, and legumes. But taking calcium, potassium, and magnesium supplements instead of eating foods that are high in those nutrients does not have the same effect. The DASH diet also includes whole grains, fish, and poultry. The DASH diet is one of several lifestyle changes your doctor may recommend to lower your high blood pressure. Your doctor may also want you to decrease the amount of sodium in your diet. Lowering sodium while following the DASH diet can lower blood pressure even further than just the DASH diet alone. Follow-up care is a key part of your treatment and safety. Be sure to make and go to all appointments, and call your doctor if you are having problems. It's also a good idea to know your test results and keep a list of the medicines you take. How can you care for yourself at home? Following the DASH diet · Eat 4 to 5 servings of fruit each day. A serving is 1 medium-sized piece of fruit, ½ cup chopped or canned fruit, 1/4 cup dried fruit, or 4 ounces (½ cup) of fruit juice. Choose fruit more often than fruit juice. · Eat 4 to 5 servings of vegetables each day. A serving is 1 cup of lettuce or raw leafy vegetables, ½ cup of chopped or cooked vegetables, or 4 ounces (½ cup) of vegetable juice. Choose vegetables more often than vegetable juice. · Get 2 to 3 servings of low-fat and fat-free dairy each day. A serving is 8 ounces of milk, 1 cup of yogurt, or 1 ½ ounces of cheese. · Eat 6 to 8 servings of grains each day. A serving is 1 slice of bread, 1 ounce of dry cereal, or ½ cup of cooked rice, pasta, or cooked cereal. Try to choose whole-grain products as much as possible. · Limit lean meat, poultry, and fish to 2 servings each day. A serving is 3 ounces, about the size of a deck of cards. · Eat 4 to 5 servings of nuts, seeds, and legumes (cooked dried beans, lentils, and split peas) each week. A serving is 1/3 cup of nuts, 2 tablespoons of seeds, or ½ cup of cooked beans or peas. · Limit fats and oils to 2 to 3 servings each day. A serving is 1 teaspoon of vegetable oil or 2 tablespoons of salad dressing. · Limit sweets and added sugars to 5 servings or less a week. A serving is 1 tablespoon jelly or jam, ½ cup sorbet, or 1 cup of lemonade. · Eat less than 2,300 milligrams (mg) of sodium a day. If you limit your sodium to 1,500 mg a day, you can lower your blood pressure even more. Tips for success · Start small. Do not try to make dramatic changes to your diet all at once. You might feel that you are missing out on your favorite foods and then be more likely to not follow the plan. Make small changes, and stick with them. Once those changes become habit, add a few more changes. · Try some of the following: ? Make it a goal to eat a fruit or vegetable at every meal and at snacks. This will make it easy to get the recommended amount of fruits and vegetables each day. ? Try yogurt topped with fruit and nuts for a snack or healthy dessert. ? Add lettuce, tomato, cucumber, and onion to sandwiches. ? Combine a ready-made pizza crust with low-fat mozzarella cheese and lots of vegetable toppings. Try using tomatoes, squash, spinach, broccoli, carrots, cauliflower, and onions. ? Have a variety of cut-up vegetables with a low-fat dip as an appetizer instead of chips and dip. ? Sprinkle sunflower seeds or chopped almonds over salads.  Or try adding chopped walnuts or almonds to cooked vegetables. ? Try some vegetarian meals using beans and peas. Add garbanzo or kidney beans to salads. Make burritos and tacos with mashed linn beans or black beans. Where can you learn more? Go to http://paulie-nestor.info/ Enter G815 in the search box to learn more about \"DASH Diet: Care Instructions. \" Current as of: December 15, 2019Content Version: 12.4 © 8097-1762 [employer]. Care instructions adapted under license by [employer] (which disclaims liability or warranty for this information). If you have questions about a medical condition or this instruction, always ask your healthcare professional. [address] any warranty or liability for your use of this information.

## 2020-06-05 DIAGNOSIS — G43.709 CHRONIC MIGRAINE WITHOUT AURA WITHOUT STATUS MIGRAINOSUS, NOT INTRACTABLE: ICD-10-CM

## 2020-06-05 DIAGNOSIS — G44.321 INTRACTABLE CHRONIC POST-TRAUMATIC HEADACHE: ICD-10-CM

## 2020-06-06 RX ORDER — ERENUMAB-AOOE 70 MG/ML
INJECTION SUBCUTANEOUS
Qty: 1 SYRINGE | Refills: 3 | Status: SHIPPED | OUTPATIENT
Start: 2020-06-06 | End: 2020-09-28

## 2020-07-09 ENCOUNTER — VIRTUAL VISIT (OUTPATIENT)
Dept: INTERNAL MEDICINE CLINIC | Age: 55
End: 2020-07-09

## 2020-07-09 DIAGNOSIS — R73.9 ELEVATED BLOOD SUGAR: ICD-10-CM

## 2020-07-09 DIAGNOSIS — I10 BENIGN ESSENTIAL HTN: Primary | ICD-10-CM

## 2020-07-09 DIAGNOSIS — E78.00 HYPERCHOLESTEREMIA: ICD-10-CM

## 2020-07-09 RX ORDER — INSULIN PUMP SYRINGE, 3 ML
EACH MISCELLANEOUS
Qty: 1 KIT | Refills: 0 | Status: SHIPPED | OUTPATIENT
Start: 2020-07-09

## 2020-07-09 RX ORDER — ROSUVASTATIN CALCIUM 20 MG/1
TABLET, COATED ORAL
Qty: 90 TAB | Refills: 1 | Status: SHIPPED | OUTPATIENT
Start: 2020-07-09 | End: 2021-01-04

## 2020-07-09 RX ORDER — ZOSTER VACCINE RECOMBINANT, ADJUVANTED 50 MCG/0.5
0.5 KIT INTRAMUSCULAR ONCE
Qty: 0.5 ML | Refills: 1 | Status: SHIPPED | OUTPATIENT
Start: 2020-07-09 | End: 2020-07-09

## 2020-07-09 NOTE — PROGRESS NOTES
Fe Fleming is a 62 y.o. male who was seen by synchronous (real-time) audio-video technology on 7/9/2020 for No chief complaint on file. Assessment & Plan:   Diagnoses and all orders for this visit:    1. Benign essential HTN  -     METABOLIC PANEL, COMPREHENSIVE    2. Elevated blood sugar  -     HEMOGLOBIN A1C WITH EAG  -     METABOLIC PANEL, COMPREHENSIVE    3. Hypercholesteremia  -     LIPID PANEL    Other orders  -     Blood-Glucose Meter monitoring kit; Check blood sugar tid  -     rosuvastatin (CRESTOR) 20 mg tablet; TAKE 1 TABLET BY MOUTH EVERY DAY IN THE EVENING        Return to clinic in 3 months for diabetes check    Subjective:   Patient is a 42-year-old male who is following up for diabetes and high cholesterol. Patient states he is taking his medications rosuvastatin metformin as directed and doing well. He is denying chest pain shortness of breath RIVERA. Patient states that he follows up with his PCP on a regular basis, states that he is eating healthy and exercising. Prior to Admission medications    Medication Sig Start Date End Date Taking? Authorizing Provider   Blood-Glucose Meter monitoring kit Check blood sugar tid 7/9/20  Yes Tamra Naidu MD   rosuvastatin (CRESTOR) 20 mg tablet TAKE 1 TABLET BY MOUTH EVERY DAY IN THE EVENING 7/9/20  Yes Tamra Naidu MD   Aimovig Autoinjector 70 mg/mL injection INJECT 1 ML SUBCUTANEOUSLY EACH MONTH 6/6/20   Gian Agrawal MD   Blood-Glucose Meter (OneTouch Verio Flex Start) monitoring kit Test once a day 4/9/20   Tamra Naidu MD   OTHER Indications: Nudexta 20-10mg    Provider, Historical   gabapentin (NEURONTIN) 600 mg tablet TAKE ONE(1) TABLET BY MOUTH THREE(3) TIMES DAILY 12/4/19   Gian Agrawal MD   clopidogrel (PLAVIX) 75 mg tab TAKE 1 TABLET BY ORAL ROUTE EVERY DAY 12/8/17   Provider, Historical   ARIPiprazole (ABILIFY) 2 mg tablet Take  by mouth daily.     Provider, Historical   aspirin 81 mg chewable tablet Take 1 Tab by mouth daily. 10/12/17   Tolu Marrufo MD   metoprolol tartrate (LOPRESSOR) 25 mg tablet Take 1 Tab by mouth every twelve (12) hours. 10/12/17   Rosanne Pineda MD   traZODone (DESYREL) 50 mg tablet Take 50 mg by mouth nightly. Provider, Historical   sertraline (ZOLOFT) 100 mg tablet Take 100 mg by mouth daily. Provider, Historical     Current Outpatient Medications   Medication Sig Dispense Refill    Blood-Glucose Meter monitoring kit Check blood sugar tid 1 Kit 0    rosuvastatin (CRESTOR) 20 mg tablet TAKE 1 TABLET BY MOUTH EVERY DAY IN THE EVENING 90 Tab 1    Aimovig Autoinjector 70 mg/mL injection INJECT 1 ML SUBCUTANEOUSLY EACH MONTH 1 Syringe 3    Blood-Glucose Meter (OneTouch Verio Flex Start) monitoring kit Test once a day 1 Kit 0    OTHER Indications: Nudexta 20-10mg      gabapentin (NEURONTIN) 600 mg tablet TAKE ONE(1) TABLET BY MOUTH THREE(3) TIMES DAILY 270 Tab 1    clopidogrel (PLAVIX) 75 mg tab TAKE 1 TABLET BY ORAL ROUTE EVERY DAY  5    ARIPiprazole (ABILIFY) 2 mg tablet Take  by mouth daily.  aspirin 81 mg chewable tablet Take 1 Tab by mouth daily. 30 Tab 6    metoprolol tartrate (LOPRESSOR) 25 mg tablet Take 1 Tab by mouth every twelve (12) hours. 60 Tab 6    traZODone (DESYREL) 50 mg tablet Take 50 mg by mouth nightly.  sertraline (ZOLOFT) 100 mg tablet Take 100 mg by mouth daily.        No Known Allergies  Past Medical History:   Diagnosis Date    Anxiety disorder     CAD (coronary artery disease) 10/10/2017    STEMI and stents    Depression     Diarrhea     Falls     Headache     Joint pain     Joint pain     Memory disorder     Memory loss     Muscle pain     Muscle pain     Muscle weakness     Muscle weakness     Torn rotator cuff     right side    Vertigo     Visual disturbance     Visual disturbance      Past Surgical History:   Procedure Laterality Date    HX ORTHOPAEDIC  02/2017    rotator cuff repair    HX OTHER SURGICAL  2016    cervical radiculopathy Family History   Problem Relation Age of Onset    Heart Disease Mother     Stroke Mother        Review of Systems   All other systems reviewed and are negative. Objective:   No flowsheet data found. [INSTRUCTIONS:  \"[x]\" Indicates a positive item  \"[]\" Indicates a negative item  -- DELETE ALL ITEMS NOT EXAMINED]    Constitutional: [x] Appears well-developed and well-nourished [x] No apparent distress      [] Abnormal -     Mental status: [x] Alert and awake  [x] Oriented to person/place/time [x] Able to follow commands    [] Abnormal -     Eyes:   EOM    [x]  Normal    [] Abnormal -   Sclera  [x]  Normal    [] Abnormal -          Discharge [x]  None visible   [] Abnormal -     HENT: [x] Normocephalic, atraumatic  [] Abnormal -   [x] Mouth/Throat: Mucous membranes are moist    External Ears [x] Normal  [] Abnormal -    Neck: [x] No visualized mass [] Abnormal -     Pulmonary/Chest: [x] Respiratory effort normal   [x] No visualized signs of difficulty breathing or respiratory distress        [] Abnormal -      Musculoskeletal:   [x] Normal gait with no signs of ataxia         [x] Normal range of motion of neck        [] Abnormal -     Neurological:        [x] No Facial Asymmetry (Cranial nerve 7 motor function) (limited exam due to video visit)          [x] No gaze palsy        [] Abnormal -          Skin:        [x] No significant exanthematous lesions or discoloration noted on facial skin         [] Abnormal -            Psychiatric:       [x] Normal Affect [] Abnormal -        [x] No Hallucinations    Other pertinent observable physical exam findings:-        We discussed the expected course, resolution and complications of the diagnosis(es) in detail. Medication risks, benefits, costs, interactions, and alternatives were discussed as indicated. I advised him to contact the office if his condition worsens, changes or fails to improve as anticipated.  He expressed understanding with the diagnosis(es) and plan. Demond Washburnrichymelissa, who was evaluated through a patient-initiated, synchronous (real-time) audio-video encounter, and/or his healthcare decision maker, is aware that it is a billable service, with coverage as determined by his insurance carrier. He provided verbal consent to proceed: Yes, and patient identification was verified. It was conducted pursuant to the emergency declaration under the [address] [place] [address] and the [facility] [employer] Act. A caregiver was present when appropriate. Ability to conduct physical exam was limited. I was at home. The patient was at home.       Alesha Nur MD

## 2020-09-28 DIAGNOSIS — G43.709 CHRONIC MIGRAINE WITHOUT AURA WITHOUT STATUS MIGRAINOSUS, NOT INTRACTABLE: ICD-10-CM

## 2020-09-28 DIAGNOSIS — G44.321 INTRACTABLE CHRONIC POST-TRAUMATIC HEADACHE: ICD-10-CM

## 2020-09-28 RX ORDER — ERENUMAB-AOOE 70 MG/ML
INJECTION SUBCUTANEOUS
Qty: 1 SYRINGE | Refills: 3 | Status: SHIPPED | OUTPATIENT
Start: 2020-09-28 | End: 2021-02-01 | Stop reason: SDUPTHER

## 2020-10-09 ENCOUNTER — VIRTUAL VISIT (OUTPATIENT)
Dept: INTERNAL MEDICINE CLINIC | Age: 55
End: 2020-10-09
Payer: MEDICARE

## 2020-10-09 DIAGNOSIS — I10 BENIGN ESSENTIAL HTN: ICD-10-CM

## 2020-10-09 DIAGNOSIS — R73.9 ELEVATED BLOOD SUGAR: Primary | ICD-10-CM

## 2020-10-09 PROCEDURE — G9717 DOC PT DX DEP/BP F/U NT REQ: HCPCS | Performed by: FAMILY MEDICINE

## 2020-10-09 PROCEDURE — 99214 OFFICE O/P EST MOD 30 MIN: CPT | Performed by: FAMILY MEDICINE

## 2020-10-09 PROCEDURE — G8427 DOCREV CUR MEDS BY ELIG CLIN: HCPCS | Performed by: FAMILY MEDICINE

## 2020-10-09 PROCEDURE — G8756 NO BP MEASURE DOC: HCPCS | Performed by: FAMILY MEDICINE

## 2020-10-09 PROCEDURE — 3017F COLORECTAL CA SCREEN DOC REV: CPT | Performed by: FAMILY MEDICINE

## 2020-10-09 NOTE — PROGRESS NOTES
Ross Medina is a 62 y.o. male who was seen by synchronous (real-time) audio-video technology on 10/9/2020 for High Blood Sugar        Assessment & Plan:   Diagnoses and all orders for this visit:    1. Elevated blood sugar    2. Benign essential HTN      Patient will return to clinic in 3 months for diabetes      Subjective:     Patient is a 64-year-old male who comes in for follow-up on blood sugar and hyperlipidemia. Patient states he is been taking his medications daily as directed. He is not checking his sugars and did not come in for his blood work in July that was ordered. He states that he will try to come in today to get his blood work. Denies any side effects from medications  Prior to Admission medications    Medication Sig Start Date End Date Taking? Authorizing Provider   Aimovig Autoinjector 70 mg/mL injection INJECT 1 ML SUBCUTANEOUSLY EACH MONTH 9/28/20   Handy Maloney MD   Blood-Glucose Meter monitoring kit Check blood sugar tid 7/9/20   Jay Jay Skinner MD   rosuvastatin (CRESTOR) 20 mg tablet TAKE 1 TABLET BY MOUTH EVERY DAY IN THE EVENING 7/9/20   Jay Jay Skinner MD   Blood-Glucose Meter (OneTouch Verio Flex Start) monitoring kit Test once a day 4/9/20   Jay Jay Skinner MD   OTHER Indications: Nudexta 20-10mg    Provider, Historical   gabapentin (NEURONTIN) 600 mg tablet TAKE ONE(1) TABLET BY MOUTH THREE(3) TIMES DAILY 12/4/19   Handy Maloney MD   clopidogrel (PLAVIX) 75 mg tab TAKE 1 TABLET BY ORAL ROUTE EVERY DAY 12/8/17   Provider, Historical   ARIPiprazole (ABILIFY) 2 mg tablet Take  by mouth daily. Provider, Historical   aspirin 81 mg chewable tablet Take 1 Tab by mouth daily. 10/12/17   Keshav Marrufo MD   metoprolol tartrate (LOPRESSOR) 25 mg tablet Take 1 Tab by mouth every twelve (12) hours. 10/12/17   Hero Shelton MD   traZODone (DESYREL) 50 mg tablet Take 50 mg by mouth nightly. Provider, Historical   sertraline (ZOLOFT) 100 mg tablet Take 100 mg by mouth daily. Provider, Historical     Patient Active Problem List    Diagnosis Date Noted    Chest pain 10/10/2017     Priority: 1 - One    Recurrent depression ([facility] 12/21/2017    ST [facility] 10/10/2017    Anxiety 12/13/2016    Intractable chronic post-traumatic headache 12/13/2016    ADD (attention deficit disorder) 12/13/2016    Hypovitaminosis D 03/07/2016    Hypercholesteremia 03/07/2016     Current Outpatient Medications   Medication Sig Dispense Refill    Aimovig Autoinjector 70 mg/mL injection INJECT 1 ML SUBCUTANEOUSLY EACH MONTH 1 Syringe 3    Blood-Glucose Meter monitoring kit Check blood sugar tid 1 Kit 0    rosuvastatin (CRESTOR) 20 mg tablet TAKE 1 TABLET BY MOUTH EVERY DAY IN THE EVENING 90 Tab 1    Blood-Glucose Meter (OneTouch Verio Flex Start) monitoring kit Test once a day 1 Kit 0    OTHER Indications: Nudexta 20-10mg      gabapentin (NEURONTIN) 600 mg tablet TAKE ONE(1) TABLET BY MOUTH THREE(3) TIMES DAILY 270 Tab 1    clopidogrel (PLAVIX) 75 mg tab TAKE 1 TABLET BY ORAL ROUTE EVERY DAY  5    ARIPiprazole (ABILIFY) 2 mg tablet Take  by mouth daily.  aspirin 81 mg chewable tablet Take 1 Tab by mouth daily. 30 Tab 6    metoprolol tartrate (LOPRESSOR) 25 mg tablet Take 1 Tab by mouth every twelve (12) hours. 60 Tab 6    traZODone (DESYREL) 50 mg tablet Take 50 mg by mouth nightly.  sertraline (ZOLOFT) 100 mg tablet Take 100 mg by mouth daily.        No Known Allergies  Past Medical History:   Diagnosis Date    Anxiety disorder     CAD (coronary artery disease) 10/10/2017    STEMI and stents    Depression     Diarrhea     Falls     Headache     Joint pain     Joint pain     Memory disorder     Memory loss     Muscle pain     Muscle pain     Muscle weakness     Muscle weakness     Torn rotator cuff     right side    Vertigo     Visual disturbance     Visual disturbance      Past Surgical History: Procedure Laterality Date    HX ORTHOPAEDIC  02/2017    rotator cuff repair    HX OTHER SURGICAL  2016    cervical radiculopathy     Family History   Problem Relation Age of Onset    Heart Disease Mother     Stroke Mother         Pertinent positive and negative systems noted in HPI, remainder of systems negative      Objective:   No flowsheet data found. [INSTRUCTIONS:  \"[x]\" Indicates a positive item  \"[]\" Indicates a negative item  -- DELETE ALL ITEMS NOT EXAMINED]    Constitutional: [x] Appears well-developed and well-nourished [x] No apparent distress      [] Abnormal -     Mental status: [x] Alert and awake  [x] Oriented to person/place/time [x] Able to follow commands    [] Abnormal -     Eyes:   EOM    [x]  Normal    [] Abnormal -   Sclera  [x]  Normal    [] Abnormal -          Discharge [x]  None visible   [] Abnormal -     HENT: [x] Normocephalic, atraumatic  [] Abnormal -   [x] Mouth/Throat: Mucous membranes are moist    External Ears [x] Normal  [] Abnormal -    Neck: [x] No visualized mass [] Abnormal -     Pulmonary/Chest: [x] Respiratory effort normal   [x] No visualized signs of difficulty breathing or respiratory distress        [] Abnormal -      Musculoskeletal:   [x] Normal gait with no signs of ataxia         [x] Normal range of motion of neck        [] Abnormal -     Neurological:        [x] No Facial Asymmetry (Cranial nerve 7 motor function) (limited exam due to video visit)          [x] No gaze palsy        [] Abnormal -          Skin:        [x] No significant exanthematous lesions or discoloration noted on facial skin         [] Abnormal -            Psychiatric:       [x] Normal Affect [] Abnormal -        [x] No Hallucinations    Other pertinent observable physical exam findings:-        We discussed the expected course, resolution and complications of the diagnosis(es) in detail. Medication risks, benefits, costs, interactions, and alternatives were discussed as indicated.   I advised him to contact the office if his condition worsens, changes or fails to improve as anticipated. He expressed understanding with the diagnosis(es) and plan. Mariely Salinas, who was evaluated through a patient-initiated, synchronous (real-time) audio-video encounter, and/or his healthcare decision maker, is aware that it is a billable service, with coverage as determined by his insurance carrier. He provided verbal consent to proceed: Yes, and patient identification was verified. It was conducted pursuant to the emergency declaration under the [address] [place] [address], [address] [place] [address] authority and the [employer] Act. A caregiver was present when appropriate. Ability to conduct physical exam was limited. I was at home. The patient was at home.       Dominick Hollingsworth MD

## 2020-12-08 ENCOUNTER — TELEPHONE (OUTPATIENT)
Dept: NEUROLOGY | Age: 55
End: 2020-12-08

## 2020-12-08 NOTE — TELEPHONE ENCOUNTER
Re: Katherene Boas approved    Approval rec'd from [employer] via [facility]    Effective through 12/31/21  PA # [id number]

## 2021-01-04 RX ORDER — ROSUVASTATIN CALCIUM 20 MG/1
TABLET, COATED ORAL
Qty: 90 TAB | Refills: 1 | Status: SHIPPED | OUTPATIENT
Start: 2021-01-04 | End: 2021-01-13 | Stop reason: SDUPTHER

## 2021-01-13 ENCOUNTER — VIRTUAL VISIT (OUTPATIENT)
Dept: INTERNAL MEDICINE CLINIC | Age: 56
End: 2021-01-13
Payer: MEDICARE

## 2021-01-13 DIAGNOSIS — Z12.11 SCREEN FOR COLON CANCER: ICD-10-CM

## 2021-01-13 DIAGNOSIS — Z12.5 SPECIAL SCREENING FOR MALIGNANT NEOPLASM OF PROSTATE: ICD-10-CM

## 2021-01-13 DIAGNOSIS — Z13.39 SCREENING FOR ALCOHOLISM: ICD-10-CM

## 2021-01-13 DIAGNOSIS — R73.9 ELEVATED BLOOD SUGAR: ICD-10-CM

## 2021-01-13 DIAGNOSIS — Z13.31 SCREENING FOR DEPRESSION: ICD-10-CM

## 2021-01-13 DIAGNOSIS — Z00.00 MEDICARE ANNUAL WELLNESS VISIT, SUBSEQUENT: Primary | ICD-10-CM

## 2021-01-13 PROCEDURE — 3017F COLORECTAL CA SCREEN DOC REV: CPT | Performed by: FAMILY MEDICINE

## 2021-01-13 PROCEDURE — G8417 CALC BMI ABV UP PARAM F/U: HCPCS | Performed by: FAMILY MEDICINE

## 2021-01-13 PROCEDURE — G0439 PPPS, SUBSEQ VISIT: HCPCS | Performed by: FAMILY MEDICINE

## 2021-01-13 PROCEDURE — G9717 DOC PT DX DEP/BP F/U NT REQ: HCPCS | Performed by: FAMILY MEDICINE

## 2021-01-13 PROCEDURE — 99214 OFFICE O/P EST MOD 30 MIN: CPT | Performed by: FAMILY MEDICINE

## 2021-01-13 PROCEDURE — G8427 DOCREV CUR MEDS BY ELIG CLIN: HCPCS | Performed by: FAMILY MEDICINE

## 2021-01-13 RX ORDER — INSULIN PUMP SYRINGE, 3 ML
EACH MISCELLANEOUS
Qty: 1 KIT | Refills: 0 | Status: SHIPPED | OUTPATIENT
Start: 2021-01-13

## 2021-01-13 RX ORDER — ROSUVASTATIN CALCIUM 20 MG/1
20 TABLET, COATED ORAL
Qty: 90 TAB | Refills: 1 | Status: SHIPPED | OUTPATIENT
Start: 2021-01-13

## 2021-01-13 NOTE — PROGRESS NOTES
Ld Mcneal is a 60 y.o. male who was seen by synchronous (real-time) audio-video technology on 1/13/2021 for No chief complaint on file. Assessment & Plan:   Diagnoses and all orders for this visit:    1. Medicare annual wellness visit, subsequent    2. Elevated blood sugar    3. Screening for alcoholism  -     [place] ANNUAL ALCOHOL SCREEN 15 MIN    4. Screening for depression  -     DEPRESSION SCREEN ANNUAL    5. Screen for colon cancer  -     COLOGUARD TEST (FECAL DNA COLORECTAL CANCER SCREENING)    6. Special screening for malignant neoplasm of prostate  -     PSA SCREENING (SCREENING); Future            Subjective:   Patient is a 69-year-old male who is following up for diabetes and blood pressure. Patient states that his last blood pressure check at [facility] was 128/78. Denies any chest pain shortness of breath or RIVERA. Patient states he is taking his medications as directed. He states that his glucose meter was required prior Auth and it was never sent. Patient states that he has not been able to check his blood sugars recently. Also states that he was not able to come to clinic for lab work after last visit. Patient is compliant with medications    Prior to Admission medications    Medication Sig Start Date End Date Taking?  Authorizing Provider   rosuvastatin (CRESTOR) 20 mg tablet TAKE 1 TABLET BY MOUTH EVERY DAY IN THE EVENING 1/4/21   Laith Talley MD   Aimovig Autoinjector 70 mg/mL injection INJECT 1 ML SUBCUTANEOUSLY EACH MONTH 9/28/20   Bill Rodriguez MD   Blood-Glucose Meter monitoring kit Check blood sugar tid 7/9/20   Laith Talley MD   Blood-Glucose Meter (OneTouch Verio Flex Start) monitoring kit Test once a day 4/9/20   Laith Talley MD   OTHER Indications: Nudexta 20-10mg    Provider, Historical   gabapentin (NEURONTIN) 600 mg tablet TAKE ONE(1) TABLET BY MOUTH THREE(3) TIMES DAILY 12/4/19   Bill Rodriguez MD   clopidogrel (PLAVIX) 75 mg tab TAKE 1 TABLET BY ORAL ROUTE EVERY DAY 12/8/17   Provider, Historical   ARIPiprazole (ABILIFY) 2 mg tablet Take  by mouth daily. Provider, Historical   aspirin 81 mg chewable tablet Take 1 Tab by mouth daily. 10/12/17   Jeevan Marrufo MD   metoprolol tartrate (LOPRESSOR) 25 mg tablet Take 1 Tab by mouth every twelve (12) hours. 10/12/17   Eduardo Bain MD   traZODone (DESYREL) 50 mg tablet Take 50 mg by mouth nightly. Provider, Historical   sertraline (ZOLOFT) 100 mg tablet Take 100 mg by mouth daily. Provider, Historical     Patient Active Problem List    Diagnosis Date Noted    Chest pain 10/10/2017     Priority: 1 - One    Recurrent depression ([facility] 12/21/2017    ST elevation (STEMI) [facility] 10/10/2017    Anxiety 12/13/2016    Intractable chronic post-traumatic headache 12/13/2016    ADD (attention deficit disorder) 12/13/2016    Hypovitaminosis D 03/07/2016    Hypercholesteremia 03/07/2016     Current Outpatient Medications   Medication Sig Dispense Refill    rosuvastatin (CRESTOR) 20 mg tablet TAKE 1 TABLET BY MOUTH EVERY DAY IN THE EVENING 90 Tab 1    Aimovig Autoinjector 70 mg/mL injection INJECT 1 ML SUBCUTANEOUSLY EACH MONTH 1 Syringe 3    Blood-Glucose Meter monitoring kit Check blood sugar tid 1 Kit 0    Blood-Glucose Meter (OneTouch Verio Flex Start) monitoring kit Test once a day 1 Kit 0    OTHER Indications: Nudexta 20-10mg      gabapentin (NEURONTIN) 600 mg tablet TAKE ONE(1) TABLET BY MOUTH THREE(3) TIMES DAILY 270 Tab 1    clopidogrel (PLAVIX) 75 mg tab TAKE 1 TABLET BY ORAL ROUTE EVERY DAY  5    ARIPiprazole (ABILIFY) 2 mg tablet Take  by mouth daily.  aspirin 81 mg chewable tablet Take 1 Tab by mouth daily. 30 Tab 6    metoprolol tartrate (LOPRESSOR) 25 mg tablet Take 1 Tab by mouth every twelve (12) hours. 60 Tab 6    traZODone (DESYREL) 50 mg tablet Take 50 mg by mouth nightly.  sertraline (ZOLOFT) 100 mg tablet Take 100 mg by mouth daily.        No Known Allergies  Past Medical History:   Diagnosis Date    Anxiety disorder     CAD (coronary artery disease) 10/10/2017    STEMI and stents    Depression     Diarrhea     Falls     Headache     Joint pain     Joint pain     Memory disorder     Memory loss     Muscle pain     Muscle pain     Muscle weakness     Muscle weakness     Torn rotator cuff     right side    Vertigo     Visual disturbance     Visual disturbance      Past Surgical History:   Procedure Laterality Date    HX ORTHOPAEDIC  02/2017    rotator cuff repair    HX OTHER SURGICAL  2016    cervical radiculopathy     Family History   Problem Relation Age of Onset    Heart Disease Mother     Stroke Mother      Social History     Tobacco Use    Smoking status: Never Smoker    Smokeless tobacco: Never Used   Substance Use Topics    Alcohol use: No           Objective:   No flowsheet data found.      [INSTRUCTIONS:  \"[x]\" Indicates a positive item  \"[]\" Indicates a negative item  -- DELETE ALL ITEMS NOT EXAMINED]    Constitutional: [x] Appears well-developed and well-nourished [x] No apparent distress      [] Abnormal -     Mental status: [x] Alert and awake  [x] Oriented to person/place/time [x] Able to follow commands    [] Abnormal -     Eyes:   EOM    [x]  Normal    [] Abnormal -   Sclera  [x]  Normal    [] Abnormal -          Discharge [x]  None visible   [] Abnormal -     HENT: [x] Normocephalic, atraumatic  [] Abnormal -   [x] Mouth/Throat: Mucous membranes are moist    External Ears [x] Normal  [] Abnormal -    Neck: [x] No visualized mass [] Abnormal -     Pulmonary/Chest: [x] Respiratory effort normal   [x] No visualized signs of difficulty breathing or respiratory distress        [] Abnormal -      Musculoskeletal:   [x] Normal gait with no signs of ataxia         [x] Normal range of motion of neck        [] Abnormal -     Neurological:        [x] No Facial Asymmetry (Cranial nerve 7 motor function) (limited exam due to video visit)          [x] No gaze palsy        [] Abnormal -          Skin:        [x] No significant exanthematous lesions or discoloration noted on facial skin         [] Abnormal -            Psychiatric:       [x] Normal Affect [] Abnormal -        [x] No Hallucinations    Other pertinent observable physical exam findings:-        We discussed the expected course, resolution and complications of the diagnosis(es) in detail. Medication risks, benefits, costs, interactions, and alternatives were discussed as indicated. I advised him to contact the office if his condition worsens, changes or fails to improve as anticipated. He expressed understanding with the diagnosis(es) and plan. Darryle Hooker, who was evaluated through a patient-initiated, synchronous (real-time) audio-video encounter, and/or his healthcare decision maker, is aware that it is a billable service, with coverage as determined by his insurance carrier. He provided verbal consent to proceed: Yes, and patient identification was verified. It was conducted pursuant to the emergency declaration under the [address] [place] [address] authority and the [employer] General Act. A caregiver was present when appropriate. Ability to conduct physical exam was limited. I was at home. The patient was at home. Alyssa Hsu MD    This is the Subsequent Medicare Annual Wellness Exam, performed 12 months or more after the Initial AWV or the last Subsequent AWV    I have reviewed the patient's medical history in detail and updated the computerized patient record.      Depression Risk Factor Screening:     3 most recent PHQ Screens 9/24/2018   Little interest or pleasure in doing things Not at all   Feeling down, depressed, irritable, or hopeless Not at all   Total Score PHQ 2 0   Trouble falling or staying asleep, or sleeping too much Not at all   Feeling tired or having little energy Not at all   Poor appetite, weight loss, or overeating Not at all   Feeling bad about yourself - or that you are a failure or have let yourself or your family down Not at all   Trouble concentrating on things such as school, work, reading, or watching TV Not at all   Moving or speaking so slowly that other people could have noticed; or the opposite being so fidgety that others notice Not at all   Thoughts of being better off dead, or hurting yourself in some way Not at all   PHQ 9 Score 0       Alcohol Risk Screen    Do you average more than 2 drinks per night or 14 drinks a week: No    On any one occasion in the past three months have you have had more than 4 drinks containing alcohol:  No        Functional Ability and Level of Safety:    Hearing: Hearing is good. Activities of Daily Living: The home contains: no safety equipment. Patient does total self care      Ambulation: with no difficulty     Fall Risk:  Fall Risk Assessment, last 12 mths 10/19/2017   Able to walk? Yes   Fall in past 12 months? No      Abuse Screen:  Patient is not abused       Cognitive Screening    Has your family/caregiver stated any concerns about your memory: no    Cognitive Screening: Normal - MMSE (Mini Mental Status Exam)    Assessment/Plan   Education and counseling provided:  Are appropriate based on today's review and evaluation    Diagnoses and all orders for this visit:    1. Medicare annual wellness visit, subsequent    2. Elevated blood sugar    3. Screening for alcoholism  -     AR ANNUAL ALCOHOL SCREEN 15 MIN    4. Screening for depression  -     DEPRESSION SCREEN ANNUAL    5. Screen for colon cancer  -     COLOGUARD TEST (FECAL DNA COLORECTAL CANCER SCREENING)    6. Special screening for malignant neoplasm of prostate  -     PSA SCREENING (SCREENING); Future    Other orders  -     Blood-Glucose Meter (OneTouch Verio Flex Start) monitoring kit; Test once a day  -     rosuvastatin (CRESTOR) 20 mg tablet;  Take 1 Tab by mouth nightly.         Health Maintenance Due     Health Maintenance Due   Topic Date Due    Shingrix Vaccine Age 49> (1 of 2) 12/19/2013    Colorectal Cancer Screening Combo  12/21/2018    Flu Vaccine (1) 09/01/2020    Lipid Screen  01/09/2021       Patient Care Team   Patient Care Team:  Raylene Claude, MD as PCP - General (Family Medicine)  Raylene Claude, MD as PCP - [facility] Empaneled Provider  Nasrin Rodriguez PsyD (Psychology)    History     Patient Active Problem List   Diagnosis Code    Hypovitaminosis D E55.9    Hypercholesteremia E78.00    Anxiety F41.9    Intractable chronic post-traumatic headache G44.321    ADD (attention deficit disorder) F98.8    Chest pain R07.9    ST elevation (STEMI) myocardial infarction involving right coronary artery ([address] I21.11    Recurrent depression ([address] F33.9     Past Medical History:   Diagnosis Date    Anxiety disorder     CAD (coronary artery disease) 10/10/2017    STEMI and stents    Depression     Diarrhea     Falls     Headache     Joint pain     Joint pain     Memory disorder     Memory loss     Muscle pain     Muscle pain     Muscle weakness     Muscle weakness     Torn rotator cuff     right side    Vertigo     Visual disturbance     Visual disturbance       Past Surgical History:   Procedure Laterality Date    HX ORTHOPAEDIC  02/2017    rotator cuff repair    HX OTHER SURGICAL  2016    cervical radiculopathy     Current Outpatient Medications   Medication Sig Dispense Refill    rosuvastatin (CRESTOR) 20 mg tablet TAKE 1 TABLET BY MOUTH EVERY DAY IN THE EVENING 90 Tab 1    Aimovig Autoinjector 70 mg/mL injection INJECT 1 ML SUBCUTANEOUSLY EACH MONTH 1 Syringe 3    Blood-Glucose Meter monitoring kit Check blood sugar tid 1 Kit 0    Blood-Glucose Meter (OneTouch Verio Flex Start) monitoring kit Test once a day 1 Kit 0    OTHER Indications: Nudexta 20-10mg      gabapentin (NEURONTIN) 600 mg tablet TAKE ONE(1) TABLET BY MOUTH THREE(3) TIMES DAILY 270 Tab 1    clopidogrel (PLAVIX) 75 mg tab TAKE 1 TABLET BY ORAL ROUTE EVERY DAY  5    ARIPiprazole (ABILIFY) 2 mg tablet Take  by mouth daily.  aspirin 81 mg chewable tablet Take 1 Tab by mouth daily. 30 Tab 6    metoprolol tartrate (LOPRESSOR) 25 mg tablet Take 1 Tab by mouth every twelve (12) hours. 60 Tab 6    traZODone (DESYREL) 50 mg tablet Take 50 mg by mouth nightly.  sertraline (ZOLOFT) 100 mg tablet Take 100 mg by mouth daily. No Known Allergies    Family History   Problem Relation Age of Onset    Heart Disease Mother     Stroke Mother      Social History     Tobacco Use    Smoking status: Never Smoker    Smokeless tobacco: Never Used   Substance Use Topics    Alcohol use: No       Ananth Vitale, who was evaluated through a synchronous (real-time) audio-video encounter, and/or his healthcare decision maker, is aware that it is a billable service, with coverage as determined by his insurance carrier. He provided verbal consent to proceed: Yes, and patient identification was verified. It was conducted pursuant to the emergency declaration under the [facility], [address] [place] [address] authority and the [employer] General Act. A caregiver was present when appropriate. Ability to conduct physical exam was limited. I was at home. The patient was at home.     Valencia Madison MD

## 2021-01-13 NOTE — PATIENT INSTRUCTIONS
Medicare Wellness Visit, Male The best way to live healthy is to have a lifestyle where you eat a well-balanced diet, exercise regularly, limit alcohol use, and quit all forms of tobacco/nicotine, if applicable. Regular preventive services are another way to keep healthy. Preventive services (vaccines, screening tests, monitoring & exams) can help personalize your care plan, which helps you manage your own care. Screening tests can find health problems at the earliest stages, when they are easiest to treat. [facility] follows the current, evidence-based guidelines published by the [facility] ([facility]) when recommending preventive services for our patients. Because we follow these guidelines, sometimes recommendations change over time as research supports it. (For example, a prostate screening blood test is no longer routinely recommended for men with no symptoms). Of course, you and your doctor may decide to screen more often for some diseases, based on your risk and co-morbidities (chronic disease you are already diagnosed with). Preventive services for you include: - Medicare offers their members a free annual wellness visit, which is time for you and your primary care provider to discuss and plan for your preventive service needs. Take advantage of this benefit every year! 
-All adults over age 72 should receive the recommended pneumonia vaccines. Current USPSTF guidelines recommend a series of two vaccines for the best pneumonia protection.  
-All adults should have a flu vaccine yearly and tetanus vaccine every 10 years. 
-All adults age 48 and older should receive the shingles vaccines (series of two vaccines). -All adults age 38-68 who are overweight should have a diabetes screening test once every three years. -Other screening tests & preventive services for persons with diabetes include: an eye exam to screen for diabetic retinopathy, a kidney function test, a foot exam, and stricter control over your cholesterol.  
-Cardiovascular screening for adults with routine risk involves an electrocardiogram (ECG) at intervals determined by the provider.  
-Colorectal cancer screening should be done for adults age 54-65 with no increased risk factors for colorectal cancer. There are a number of acceptable methods of screening for this type of cancer. Each test has its own benefits and drawbacks. Discuss with your provider what is most appropriate for you during your annual wellness visit. The different tests include: colonoscopy (considered the best screening method), a fecal occult blood test, a fecal DNA test, and sigmoidoscopy. 
-All adults born between [facility] should be screened once for Hepatitis C. 
-An Abdominal Aortic Aneurysm (AAA) Screening is recommended for men age 73-68 who has ever smoked in their lifetime. Here is a list of your current Health Maintenance items (your personalized list of preventive services) with a due date: 
Health Maintenance Due Topic Date Due  Shingles Vaccine (1 of 2) 12/19/2013  Colorectal Screening  12/21/2018  Yearly Flu Vaccine (1) 09/01/2020  Cholesterol Test   01/09/2021

## 2021-01-14 LAB — PSA SERPL-MCNC: 1.1 NG/ML (ref 0.01–4)

## 2021-02-01 DIAGNOSIS — G43.709 CHRONIC MIGRAINE WITHOUT AURA WITHOUT STATUS MIGRAINOSUS, NOT INTRACTABLE: ICD-10-CM

## 2021-02-01 DIAGNOSIS — G44.321 INTRACTABLE CHRONIC POST-TRAUMATIC HEADACHE: ICD-10-CM

## 2021-02-01 RX ORDER — ERENUMAB-AOOE 70 MG/ML
INJECTION SUBCUTANEOUS
Qty: 1 SYRINGE | Refills: 3 | Status: SHIPPED | OUTPATIENT
Start: 2021-02-01 | End: 2021-10-05

## 2021-02-01 RX ORDER — ERENUMAB-AOOE 70 MG/ML
INJECTION SUBCUTANEOUS
Qty: 1 SYRINGE | Refills: 3 | Status: SHIPPED | OUTPATIENT
Start: 2021-02-01 | End: 2021-02-01 | Stop reason: SDUPTHER

## 2021-02-01 NOTE — TELEPHONE ENCOUNTER
----- Message from Gloria Jewell sent at 2/1/2021 11:22 AM EST -----  Regarding: Dr. Rigoberto Bansal first and last name: Mrs. Marty Toledo / Wife      Reason for call: Pharmacy does not have Refill      Callback required yes/no and why: yes      Best contact number(s): [phone]      Details to clarify the request: Pt is at [place] [id number] and they do not have a refill for him. Wife would like to speak to Dr. Parag Sloan nurse.        Gloria Jewell

## 2021-10-04 DIAGNOSIS — G43.709 CHRONIC MIGRAINE WITHOUT AURA WITHOUT STATUS MIGRAINOSUS, NOT INTRACTABLE: ICD-10-CM

## 2021-10-04 DIAGNOSIS — G44.321 INTRACTABLE CHRONIC POST-TRAUMATIC HEADACHE: ICD-10-CM

## 2021-10-04 NOTE — TELEPHONE ENCOUNTER
----- Message from Levi Hsu sent at 10/4/2021 12:34 PM EDT -----  Regarding: / telephone  Medication Refill    Caller (if not patient):      Relationship of caller (if not patient):      Best contact number(s):[phone]      Name of medication and dosage if known: Anderson Fleming      Is patient out of this medication (yes/no): yes      Pharmacy name:[facility]    Pharmacy listed in chart? (yes/no): yes  Pharmacy phone         Details to clarify the request:Pt is out their current Carl Wilkinson

## 2021-10-04 NOTE — TELEPHONE ENCOUNTER
----- Message from Saurav Coats sent at 10/4/2021  3:10 PM EDT -----  Regarding: Dr. Jose Alejandro Peña, Telephone  General Message/Vendor Calls    Caller's first and last name: Wife Mrs Miryam Ramirez      Reason for call: Aimovig Autoinjector 70 mg/mL injection [[id number]]      Callback required yes/no and why: Yes The patient is out of medicine. Best contact number(s): [phone]      Details to clarify the request: The patient needs a refill as soon as possible. The pharmacy is requesting refills from the doctor.     [facility]/pharmacy #[id number] Juaquin, [address], [facility] [address] AT [address] [place] [address]   Phone:  [phone]  Fax:  [phone]      Saurav Coats

## 2021-10-05 RX ORDER — ERENUMAB-AOOE 70 MG/ML
INJECTION SUBCUTANEOUS
Qty: 1 ML | Refills: 1 | Status: SHIPPED | OUTPATIENT
Start: 2021-10-05 | End: 2022-07-30

## 2021-10-05 RX ORDER — ERENUMAB-AOOE 70 MG/ML
INJECTION SUBCUTANEOUS
Qty: 1 ML | Refills: 3 | Status: SHIPPED | OUTPATIENT
Start: 2021-10-05 | End: 2022-07-29 | Stop reason: SDUPTHER

## 2022-01-10 ENCOUNTER — TELEPHONE (OUTPATIENT)
Dept: NEUROLOGY | Age: 57
End: 2022-01-10

## 2022-03-18 PROBLEM — F33.9 RECURRENT DEPRESSION (HCC): Status: ACTIVE | Noted: 2017-12-21

## 2022-03-19 PROBLEM — R07.9 CHEST PAIN: Status: ACTIVE | Noted: 2017-10-10

## 2022-03-19 PROBLEM — I21.11 ST ELEVATION (STEMI) MYOCARDIAL INFARCTION INVOLVING RIGHT CORONARY ARTERY (HCC): Status: ACTIVE | Noted: 2017-10-10

## 2022-03-31 DIAGNOSIS — G44.321 INTRACTABLE CHRONIC POST-TRAUMATIC HEADACHE: ICD-10-CM

## 2022-03-31 DIAGNOSIS — G43.709 CHRONIC MIGRAINE WITHOUT AURA WITHOUT STATUS MIGRAINOSUS, NOT INTRACTABLE: ICD-10-CM

## 2022-04-01 RX ORDER — ERENUMAB-AOOE 70 MG/ML
INJECTION SUBCUTANEOUS
Qty: 1 ML | Refills: 3 | Status: SHIPPED | OUTPATIENT
Start: 2022-04-01 | End: 2022-07-29 | Stop reason: SDUPTHER

## 2022-04-01 RX ORDER — GABAPENTIN 600 MG/1
TABLET ORAL
Qty: 90 TABLET | Refills: 5 | Status: SHIPPED | OUTPATIENT
Start: 2022-04-01

## 2022-04-01 NOTE — TELEPHONE ENCOUNTER
Requested Prescriptions     Pending Prescriptions Disp Refills    Aimovig Autoinjector 70 mg/mL injection [Pharmacy Med Name: AIMOVIG 70MG/ML SOLN AUTO-INJ] 1 mL 3     Sig: INJECT 1ML SUB CUTANEOUSLY EACH MONTH    gabapentin (NEURONTIN) 600 mg tablet [Pharmacy Med Name: GABAPENTIN 600MG TABLET] 90 Tablet 5     Sig: TAKE ONE(1) TABLET BY MOUTH THREE(3) TIMES DAILY

## 2022-07-29 DIAGNOSIS — G43.709 CHRONIC MIGRAINE WITHOUT AURA WITHOUT STATUS MIGRAINOSUS, NOT INTRACTABLE: ICD-10-CM

## 2022-07-29 DIAGNOSIS — G44.321 INTRACTABLE CHRONIC POST-TRAUMATIC HEADACHE: ICD-10-CM

## 2022-07-29 NOTE — TELEPHONE ENCOUNTER
Patient requesting refill for Aimovig    Last refill: 4/1/22    Next appointment:10/18/22    Please review and fill if warranted.

## 2022-07-29 NOTE — TELEPHONE ENCOUNTER
Patient's wife calling stating patient needs Aimovig refilled. He is currently scheduled for an appt on 10/18.

## 2022-07-30 RX ORDER — ERENUMAB-AOOE 70 MG/ML
INJECTION SUBCUTANEOUS
Qty: 1 ML | Refills: 3 | Status: SHIPPED | OUTPATIENT
Start: 2022-07-30

## 2022-08-01 RX ORDER — ERENUMAB-AOOE 70 MG/ML
INJECTION SUBCUTANEOUS
Qty: 1 ML | Refills: 3 | Status: SHIPPED | OUTPATIENT
Start: 2022-08-01

## 2022-12-12 ENCOUNTER — TELEPHONE (OUTPATIENT)
Dept: NEUROLOGY | Age: 57
End: 2022-12-12

## 2022-12-12 DIAGNOSIS — G43.709 CHRONIC MIGRAINE WITHOUT AURA WITHOUT STATUS MIGRAINOSUS, NOT INTRACTABLE: ICD-10-CM

## 2022-12-12 DIAGNOSIS — G44.321 INTRACTABLE CHRONIC POST-TRAUMATIC HEADACHE: ICD-10-CM

## 2022-12-12 NOTE — TELEPHONE ENCOUNTER
Message from Phuong Silver,    [facility] Call regarding ; requesting PT Refill AMOVIG RX is being denied. \"    Phone # [phone]

## 2022-12-13 RX ORDER — ERENUMAB-AOOE 70 MG/ML
70 INJECTION SUBCUTANEOUS
Qty: 1 ML | Refills: 3 | Status: SHIPPED | OUTPATIENT
Start: 2022-12-13 | End: 2023-01-27

## 2022-12-13 NOTE — TELEPHONE ENCOUNTER
Patients wife called stating her  needs a refill on his Aimovig medication. Patient has a follow up appointment January of 2023.     Please contact Pt feeling better at this time, still aware of need of urine sample.  2nd fluid bolus infusing

## 2022-12-14 NOTE — TELEPHONE ENCOUNTER
Refill sent, spoke with wife contact confirmed. Advised meds sent. She confirmed pharmacy receipt.  Reminded next appt 1/27/23

## 2023-01-27 ENCOUNTER — OFFICE VISIT (OUTPATIENT)
Dept: NEUROLOGY | Age: 58
End: 2023-01-27
Payer: MEDICARE

## 2023-01-27 VITALS
HEART RATE: 56 BPM | SYSTOLIC BLOOD PRESSURE: 118 MMHG | OXYGEN SATURATION: 98 % | RESPIRATION RATE: 20 BRPM | DIASTOLIC BLOOD PRESSURE: 74 MMHG

## 2023-01-27 DIAGNOSIS — G44.321 INTRACTABLE CHRONIC POST-TRAUMATIC HEADACHE: ICD-10-CM

## 2023-01-27 RX ORDER — ERENUMAB-AOOE 70 MG/ML
INJECTION SUBCUTANEOUS
Qty: 1 ML | Refills: 5 | Status: SHIPPED | OUTPATIENT
Start: 2023-01-27

## 2023-01-27 NOTE — PROGRESS NOTES
Migraines- still gets them they are better   They are a little less frequent   Here lately they noticed that his eyes were worse so he had an eye doctor appt suggested that his stigm is worse   THe only things that has changed was his vision  MIgraines were better but the darkness and squinting made them get a vision check

## 2023-01-27 NOTE — PROGRESS NOTES
Paolo Johansen is a 59 y.o. male who presents with the following  Chief Complaint   Patient presents with    Follow-up     Migraines        HPI  Patient here today with his wife for migraine follow-up. Patient was last seen February 12, 2020 by Dr. Gabriel Lopez. Patient was already established on Aimovig and doing well with it. Patient was taking Zoloft and Abilify for anxiety. Sees psychiatry for mood disorder possible ADD. He was using NSAIDs for rescue. He was having photophobia and mood swings with his headaches. Today the patient and his wife states that he had been doing well with his headaches until several months ago, where he started waking with headaches that last about 10 to 15 minutes most mornings. His wife noticed that he had been squinting a lot with blurry vision so he went to the eye doctor on January 11 and they told him that his astigmatism has gotten worse and his vision has changed. He got a new prescription for glasses which have not arrived yet. He has been using Tylenol for rescue but does not use it very often. He is still using Aimovig monthly and doing very well with it. His wife states that is the only thing that has ever been very helpful for his headaches. Would like refills sent today. No Known Allergies    Current Outpatient Medications   Medication Sig    erenumab-aooe (Aimovig Autoinjector) 70 mg/mL injection INJECT 1ML SUB CUTANEOUSLY EACH MONTH  Indications: migraine prevention    erenumab-aooe (Aimovig Autoinjector) 70 mg/mL injection INJECT 1 MILLILITER SUBCUTANEOUSLY EACH MONTH    gabapentin (NEURONTIN) 600 mg tablet TAKE ONE(1) TABLET BY MOUTH THREE(3) TIMES DAILY    Blood-Glucose Meter (OneTouch Verio Flex Start) monitoring kit Test once a day    rosuvastatin (CRESTOR) 20 mg tablet Take 1 Tab by mouth nightly.     Blood-Glucose Meter monitoring kit Check blood sugar tid    OTHER Indications: Nudexta 20-10mg    clopidogrel (PLAVIX) 75 mg tab TAKE 1 TABLET BY ORAL ROUTE EVERY DAY    ARIPiprazole (ABILIFY) 2 mg tablet Take  by mouth daily. aspirin 81 mg chewable tablet Take 1 Tab by mouth daily. metoprolol tartrate (LOPRESSOR) 25 mg tablet Take 1 Tab by mouth every twelve (12) hours. traZODone (DESYREL) 50 mg tablet Take 50 mg by mouth nightly. sertraline (ZOLOFT) 100 mg tablet Take 100 mg by mouth daily. No current facility-administered medications for this visit. Social History     Tobacco Use   Smoking Status Never   Smokeless Tobacco Never       Past Medical History:   Diagnosis Date    Anxiety disorder     CAD (coronary artery disease) 10/10/2017    STEMI and stents    Depression     Diarrhea     Falls     Headache     Joint pain     Joint pain     Memory disorder     Memory loss     Muscle pain     Muscle pain     Muscle weakness     Muscle weakness     Torn rotator cuff     right side    Vertigo     Visual disturbance     Visual disturbance        Past Surgical History:   Procedure Laterality Date    HX ORTHOPAEDIC  02/2017    rotator cuff repair    HX OTHER SURGICAL  2016    cervical radiculopathy       Family History   Problem Relation Age of Onset    Heart Disease Mother     Stroke Mother        Social History     Socioeconomic History    Marital status:    Tobacco Use    Smoking status: Never    Smokeless tobacco: Never   Substance and Sexual Activity    Alcohol use: No    Drug use: No    Sexual activity: Yes       Review of Systems   Constitutional: Negative. Eyes:  Positive for blurred vision and photophobia. Neurological:  Positive for headaches. Remainder of comprehensive review is negative. Physical Exam :    Visit Vitals  /74 (BP 1 Location: Left upper arm, BP Patient Position: Sitting, BP Cuff Size: Adult)   Pulse (!) 56   Resp 20   SpO2 98%       General: Well defined, nourished, and groomed individual in no acute distress.     Neck: Supple, nontender, no bruits, no pain with resistance to active range of motion. Musculoskeletal: Extremities revealed no edema and had full range of motion of joints. Psych: Good mood and bright affect    NEUROLOGICAL EXAMINATION:    Mental Status: Alert and oriented to person, place, and time    Cranial Nerves:    II, III, IV, VI: Visual acuity grossly intact. Visual fields are normal.    Pupils are equal, round, and reactive to light and accommodation. Extra-ocular movements are full and fluid. Fundoscopic exam was benign, no ptosis or nystagmus. V-XII: Hearing is grossly intact. Facial features are symmetric, with normal sensation and strength. The palate rises symmetrically and the tongue protrudes midline. Sternocleidomastoids 5/5. Motor Examination: Normal tone, bulk, and strength, 5/5 muscle strength throughout. Coordination: Finger to nose was normal. No resting or intention tremor    Gait and Station: Steady while walking. Normal arm swing. No pronator drift. No muscle wasting or fasiculations noted. Reflexes: DTRs 2+ throughout. Results for orders placed or performed in visit on 01/14/21   PSA, DIAGNOSTIC (PROSTATE SPECIFIC AG)   Result Value Ref Range    Prostate Specific Ag 1.1 0.01 - 4.0 ng/mL       Orders Placed This Encounter    erenumab-aooe (Aimovig Autoinjector) 70 mg/mL injection     Sig: INJECT 1ML SUB CUTANEOUSLY EACH MONTH  Indications: migraine prevention     Dispense:  1 mL     Refill:  5       1. Chronic migraine without aura without status migrainosus, not intractable    2. Intractable chronic post-traumatic headache      Patient will continue to take Aimovig 70 mg monthly for migraine prevention. Patient is expecting to receive his new eyeglasses sometime next week. If after wearing them for a while, he is still having these morning headaches, the patient's wife will let us know and we will make a sooner appointment if needed. Patient will return in 6 months if doing well.         This note will not be viewable in MyChart

## 2023-08-01 DIAGNOSIS — G43.709 CHRONIC MIGRAINE WITHOUT AURA, NOT INTRACTABLE, WITHOUT STATUS MIGRAINOSUS: Primary | ICD-10-CM

## 2023-08-01 RX ORDER — ERENUMAB-AOOE 70 MG/ML
INJECTION SUBCUTANEOUS
Qty: 1 ML | Refills: 1 | Status: SHIPPED | OUTPATIENT
Start: 2023-08-01

## 2023-10-04 DIAGNOSIS — G43.709 CHRONIC MIGRAINE WITHOUT AURA, NOT INTRACTABLE, WITHOUT STATUS MIGRAINOSUS: ICD-10-CM

## 2023-10-04 RX ORDER — ERENUMAB-AOOE 70 MG/ML
INJECTION SUBCUTANEOUS
Qty: 1 ML | Refills: 1 | Status: SHIPPED | OUTPATIENT
Start: 2023-10-04

## 2023-10-17 ENCOUNTER — TELEPHONE (OUTPATIENT)
Age: 58
End: 2023-10-17

## 2023-10-17 NOTE — TELEPHONE ENCOUNTER
Patient needs a refill on his medication Aimovig Patient missed appointment yesterday due to him getting his days mixed up. Reschedule is for January.

## 2023-10-18 NOTE — TELEPHONE ENCOUNTER
Returned his call. Gave him the message that medication was sent to the pharmacy for him with 1 additional refill.

## 2023-12-02 DIAGNOSIS — G43.709 CHRONIC MIGRAINE WITHOUT AURA, NOT INTRACTABLE, WITHOUT STATUS MIGRAINOSUS: ICD-10-CM

## 2023-12-05 RX ORDER — ERENUMAB-AOOE 70 MG/ML
INJECTION SUBCUTANEOUS
Qty: 1 ML | Refills: 1 | Status: SHIPPED | OUTPATIENT
Start: 2023-12-05

## 2024-01-24 NOTE — PROGRESS NOTES
Harley Frias is a 58 y.o. male who presents with the following  Chief Complaint   Patient presents with    Follow-up     Migraines      Last office visit note  HPI  Patient here today with his wife for migraine follow-up.  Patient was last seen February 12, 2020 by Dr. Fuentes.  Patient was already established on Aimovig and doing well with it.  Patient was taking Zoloft and Abilify for anxiety.  Sees psychiatry for mood disorder possible ADD.  He was using NSAIDs for rescue.  He was having photophobia and mood swings with his headaches.     Today the patient and his wife states that he had been doing well with his headaches until several months ago, where he started waking with headaches that last about 10 to 15 minutes most mornings.  His wife noticed that he had been squinting a lot with blurry vision so he went to the eye doctor on January 11 and they told him that his astigmatism has gotten worse and his vision has changed.  He got a new prescription for glasses which have not arrived yet.  He has been using Tylenol for rescue but does not use it very often.  He is still using Aimovig monthly and doing very well with it.  His wife states that is the only thing that has ever been very helpful for his headaches.  Would like refills sent today.    Patient will continue to take Aimovig 70 mg monthly for migraine prevention.     Patient is expecting to receive his new eyeglasses sometime next week.  If after wearing them for a while, he is still having these morning headaches, the patient's wife will let us know and we will make a sooner appointment if needed.     Patient will return in 6 months if doing well.      Today's office visit note  HPI  Patient is here today for migraine follow-up.  Since the patient was here last, he continues to take Aimovig 70 mg monthly for migraine prevention and says he is still doing well with this.  Patient says that he might have 2 times a week where he is waking with minor

## 2024-01-25 ENCOUNTER — OFFICE VISIT (OUTPATIENT)
Age: 59
End: 2024-01-25
Payer: MEDICARE

## 2024-01-25 VITALS
RESPIRATION RATE: 20 BRPM | DIASTOLIC BLOOD PRESSURE: 84 MMHG | HEART RATE: 68 BPM | OXYGEN SATURATION: 98 % | SYSTOLIC BLOOD PRESSURE: 128 MMHG

## 2024-01-25 DIAGNOSIS — G43.709 CHRONIC MIGRAINE WITHOUT AURA, NOT INTRACTABLE, WITHOUT STATUS MIGRAINOSUS: ICD-10-CM

## 2024-01-25 PROCEDURE — G8421 BMI NOT CALCULATED: HCPCS

## 2024-01-25 PROCEDURE — 99213 OFFICE O/P EST LOW 20 MIN: CPT

## 2024-01-25 PROCEDURE — 3017F COLORECTAL CA SCREEN DOC REV: CPT

## 2024-01-25 PROCEDURE — 1036F TOBACCO NON-USER: CPT

## 2024-01-25 PROCEDURE — G8427 DOCREV CUR MEDS BY ELIG CLIN: HCPCS

## 2024-01-25 PROCEDURE — G8484 FLU IMMUNIZE NO ADMIN: HCPCS

## 2024-01-25 RX ORDER — ERENUMAB-AOOE 70 MG/ML
INJECTION SUBCUTANEOUS
Qty: 1 ML | Refills: 11 | Status: SHIPPED | OUTPATIENT
Start: 2024-01-25

## 2024-01-25 ASSESSMENT — ENCOUNTER SYMPTOMS: PHOTOPHOBIA: 1

## 2024-01-25 NOTE — PROGRESS NOTES
Migraines- better with the injection  Used to wake up every morning with a headache  Maybe 2 times a week, still when he wakes up   Lasting maybe 10 mins once he gets going for the day they typically just go away on its own

## 2024-01-29 DIAGNOSIS — G43.709 CHRONIC MIGRAINE WITHOUT AURA, NOT INTRACTABLE, WITHOUT STATUS MIGRAINOSUS: ICD-10-CM

## 2024-01-29 RX ORDER — ERENUMAB-AOOE 70 MG/ML
INJECTION SUBCUTANEOUS
Qty: 1 ML | Refills: 1 | OUTPATIENT
Start: 2024-01-29

## 2024-12-18 NOTE — PROGRESS NOTES
0730- Bedside report received from Griselda Verdugo [facility] 1263- No complaints of pain; groin site clean/dry/intact  1115- TRANSFER - OUT REPORT:    Verbal report given to SUZANNA Kirkpatrick(name) on Jeanette Patel  being transferred to [facility](unit) for routine progression of care       Report consisted of patients Situation, Background, Assessment and   Recommendations(SBAR). Information from the following report(s) SBAR, ED Summary, Intake/Output, Recent Results and Cardiac Rhythm NSR was reviewed with the receiving nurse. Lines:   Peripheral IV 10/10/17 Right Antecubital (Active)   Site Assessment Clean, dry, & intact 10/11/2017  8:00 AM   Phlebitis Assessment 0 10/11/2017  8:00 AM   Infiltration Assessment 0 10/11/2017  8:00 AM   Dressing Status Clean, dry, & intact 10/11/2017  8:00 AM   Dressing Type Transparent 10/11/2017  8:00 AM   Hub Color/Line Status Green;Capped 10/11/2017  8:00 AM   Action Taken Open ports on tubing capped 10/10/2017  5:16 PM   Alcohol Cap Used Yes 10/11/2017  8:00 AM        Opportunity for questions and clarification was provided.       Patient transported with:   Photodigm Grossly at least 3/5 throughout bilateral UE/LE

## 2025-01-22 NOTE — PROGRESS NOTES
Harley Frias is a 59 y.o. male who presents with the following  Chief Complaint   Patient presents with    Follow-up     Migraines      Last office visit note  HPI  Patient is here today for migraine follow-up.  Since the patient was here last, he continues to take Aimovig 70 mg monthly for migraine prevention and says he is still doing well with this.  Patient says that he might have 2 times a week where he is waking with minor headaches that last about 7 to 10 minutes and then resolve on their own.  He also says that he has about 1 severe migraine a month that can last for hours.  He says that he typically has to lie down and go to sleep.  He has associated photophobia and phonophobia with these headaches.  He says that typically when he wakes up there gone or better.  He did get new prescription glasses since his last visit here.  He is happy with the way things are at this time and is not interested in increasing the Aimovig dose.    Patient will continue to take Aimovig 70 mg monthly for migraine prevention.     Give the patient a note for work and he will return in 1 year or sooner if needed.       Today's office visit note  HPI  Patient is here today with his wife for migraine follow-up.  Since the patient was here last, he continues to take Aimovig 70 mg monthly for migraine prevention and says he is still doing well with this.  Patient says that he might have 2 times a week where he wakes with minor headaches that last about 15 minutes.  He says that he has photophobia and blurry vision with these headaches.  He does not take anything for them and they go away on their own.  Patient's wife says that he had a check of his vision last year with [facility] and got new glasses.  The patient says that the blurry vision does not occur separate from the headaches that occur in the morning.    Taken from Dr. Vanegas's note from 2/2020:   His comprehensive neuropsychological assessment in the past

## 2025-01-23 ENCOUNTER — OFFICE VISIT (OUTPATIENT)
Age: 60
End: 2025-01-23
Payer: MEDICARE

## 2025-01-23 VITALS
RESPIRATION RATE: 20 BRPM | HEART RATE: 56 BPM | OXYGEN SATURATION: 98 % | SYSTOLIC BLOOD PRESSURE: 148 MMHG | DIASTOLIC BLOOD PRESSURE: 86 MMHG

## 2025-01-23 DIAGNOSIS — G43.709 CHRONIC MIGRAINE WITHOUT AURA, NOT INTRACTABLE, WITHOUT STATUS MIGRAINOSUS: ICD-10-CM

## 2025-01-23 DIAGNOSIS — H53.8 BLURRY VISION, BILATERAL: Primary | ICD-10-CM

## 2025-01-23 PROCEDURE — G8427 DOCREV CUR MEDS BY ELIG CLIN: HCPCS

## 2025-01-23 PROCEDURE — 99214 OFFICE O/P EST MOD 30 MIN: CPT

## 2025-01-23 PROCEDURE — 1036F TOBACCO NON-USER: CPT

## 2025-01-23 PROCEDURE — G8421 BMI NOT CALCULATED: HCPCS

## 2025-01-23 PROCEDURE — 3017F COLORECTAL CA SCREEN DOC REV: CPT

## 2025-01-23 RX ORDER — ERENUMAB-AOOE 70 MG/ML
INJECTION SUBCUTANEOUS
Qty: 1 ML | Refills: 11 | Status: ACTIVE | OUTPATIENT
Start: 2025-01-23

## 2025-01-23 ASSESSMENT — ENCOUNTER SYMPTOMS: PHOTOPHOBIA: 1

## 2025-01-23 NOTE — PROGRESS NOTES
Migraines- aimovig does help  He has a headache maybe 2 times a week, lasting maybe 15 mins   He wakes up with them, has blurry vision sometimes   She thinks that his thought process   His vision is messed up a little longer than usual   It seems like when his eyes are messed up it messes with his thinking process

## 2025-04-24 ENCOUNTER — OFFICE VISIT (OUTPATIENT)
Age: 60
End: 2025-04-24

## 2025-04-24 VITALS
RESPIRATION RATE: 20 BRPM | DIASTOLIC BLOOD PRESSURE: 68 MMHG | SYSTOLIC BLOOD PRESSURE: 130 MMHG | OXYGEN SATURATION: 97 % | HEART RATE: 65 BPM

## 2025-04-24 DIAGNOSIS — G43.709 CHRONIC MIGRAINE WITHOUT AURA, NOT INTRACTABLE, WITHOUT STATUS MIGRAINOSUS: Primary | ICD-10-CM

## 2025-04-24 PROCEDURE — 99213 OFFICE O/P EST LOW 20 MIN: CPT
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
## 2025-04-24 NOTE — PROGRESS NOTES
Doing well with the injection, has maybe 2 a week, lasting maybe 10 mins   Notices them when he first gets up in the morning

## 2025-07-02 ENCOUNTER — TELEPHONE (OUTPATIENT)
Age: 60
End: 2025-07-02